# Patient Record
Sex: MALE | Race: WHITE | NOT HISPANIC OR LATINO | Employment: FULL TIME | RURAL
[De-identification: names, ages, dates, MRNs, and addresses within clinical notes are randomized per-mention and may not be internally consistent; named-entity substitution may affect disease eponyms.]

---

## 2020-07-09 ENCOUNTER — HISTORICAL (OUTPATIENT)
Dept: ADMINISTRATIVE | Facility: HOSPITAL | Age: 40
End: 2020-07-09

## 2020-07-10 LAB
INSULIN SERPL-ACNC: NORMAL U[IU]/ML
LAB AP CLINICAL INFORMATION: NORMAL
LAB AP COMMENTS: NORMAL
LAB AP DIAGNOSIS - HISTORICAL: NORMAL
LAB AP GROSS PATHOLOGY - HISTORICAL: NORMAL
LAB AP SPECIMEN SUBMITTED - HISTORICAL: NORMAL

## 2021-04-12 ENCOUNTER — OFFICE VISIT (OUTPATIENT)
Dept: FAMILY MEDICINE | Facility: CLINIC | Age: 41
End: 2021-04-12
Payer: COMMERCIAL

## 2021-04-12 VITALS
BODY MASS INDEX: 24.52 KG/M2 | RESPIRATION RATE: 16 BRPM | WEIGHT: 181 LBS | DIASTOLIC BLOOD PRESSURE: 82 MMHG | SYSTOLIC BLOOD PRESSURE: 130 MMHG | HEART RATE: 80 BPM | HEIGHT: 72 IN

## 2021-04-12 DIAGNOSIS — F41.9 ANXIETY: ICD-10-CM

## 2021-04-12 DIAGNOSIS — M47.9 SPONDYLOSIS: ICD-10-CM

## 2021-04-12 DIAGNOSIS — N52.9 ERECTILE DYSFUNCTION, UNSPECIFIED ERECTILE DYSFUNCTION TYPE: Primary | ICD-10-CM

## 2021-04-12 PROCEDURE — 99214 PR OFFICE/OUTPT VISIT, EST, LEVL IV, 30-39 MIN: ICD-10-PCS | Mod: ,,, | Performed by: FAMILY MEDICINE

## 2021-04-12 PROCEDURE — 99214 OFFICE O/P EST MOD 30 MIN: CPT | Mod: ,,, | Performed by: FAMILY MEDICINE

## 2021-04-12 RX ORDER — TESTOSTERONE CYPIONATE 200 MG/ML
INJECTION, SOLUTION INTRAMUSCULAR
COMMUNITY
Start: 2021-04-09 | End: 2022-08-12

## 2021-04-12 RX ORDER — TADALAFIL 20 MG/1
20 TABLET ORAL DAILY
Qty: 10 TABLET | Refills: 11 | Status: SHIPPED | OUTPATIENT
Start: 2021-04-12 | End: 2022-07-26 | Stop reason: SDUPTHER

## 2021-04-12 RX ORDER — DEXLANSOPRAZOLE 60 MG/1
1 CAPSULE, DELAYED RELEASE ORAL DAILY
COMMUNITY
Start: 2021-04-09 | End: 2021-08-09 | Stop reason: SDUPTHER

## 2021-04-12 RX ORDER — CYCLOBENZAPRINE HCL 10 MG
10 TABLET ORAL 3 TIMES DAILY PRN
Qty: 90 TABLET | Refills: 2 | Status: SHIPPED | OUTPATIENT
Start: 2021-04-12 | End: 2021-04-22

## 2021-04-12 RX ORDER — CLONAZEPAM 1 MG/1
1 TABLET ORAL 2 TIMES DAILY PRN
COMMUNITY
End: 2021-04-16 | Stop reason: SDUPTHER

## 2021-04-12 RX ORDER — CLONAZEPAM 1 MG/1
1 TABLET, ORALLY DISINTEGRATING ORAL 2 TIMES DAILY PRN
Qty: 30 TABLET | Refills: 2 | Status: SHIPPED | OUTPATIENT
Start: 2021-04-12 | End: 2021-04-16

## 2021-04-12 RX ORDER — CERTOLIZUMAB PEGOL 400 MG
400 KIT SUBCUTANEOUS
COMMUNITY
End: 2022-11-20

## 2021-04-13 DIAGNOSIS — M54.2 NECK PAIN: Primary | ICD-10-CM

## 2021-04-13 DIAGNOSIS — M54.9 DORSALGIA, UNSPECIFIED: ICD-10-CM

## 2021-04-13 RX ORDER — CLONAZEPAM 1 MG/1
1 TABLET ORAL 2 TIMES DAILY PRN
OUTPATIENT
Start: 2021-04-13

## 2021-04-16 RX ORDER — CLONAZEPAM 1 MG/1
1 TABLET ORAL 2 TIMES DAILY PRN
Qty: 30 TABLET | Refills: 0 | Status: SHIPPED | OUTPATIENT
Start: 2021-04-16 | End: 2021-06-02 | Stop reason: SDUPTHER

## 2021-04-29 DIAGNOSIS — M54.9 DORSALGIA, UNSPECIFIED: ICD-10-CM

## 2021-04-29 DIAGNOSIS — M54.2 NECK PAIN: Primary | ICD-10-CM

## 2021-05-03 ENCOUNTER — HOSPITAL ENCOUNTER (OUTPATIENT)
Dept: RADIOLOGY | Facility: HOSPITAL | Age: 41
Discharge: HOME | End: 2021-05-03
Attending: ORTHOPAEDIC SURGERY
Payer: COMMERCIAL

## 2021-05-03 ENCOUNTER — OFFICE VISIT (OUTPATIENT)
Dept: SPINE | Facility: CLINIC | Age: 41
End: 2021-05-03
Payer: COMMERCIAL

## 2021-05-03 DIAGNOSIS — M54.2 NECK PAIN: ICD-10-CM

## 2021-05-03 DIAGNOSIS — M54.16 LUMBAR RADICULOPATHY: ICD-10-CM

## 2021-05-03 DIAGNOSIS — M51.36 DDD (DEGENERATIVE DISC DISEASE), LUMBAR: ICD-10-CM

## 2021-05-03 DIAGNOSIS — G56.01 CARPAL TUNNEL SYNDROME, RIGHT: ICD-10-CM

## 2021-05-03 DIAGNOSIS — G56.21 CUBITAL TUNNEL SYNDROME, RIGHT: ICD-10-CM

## 2021-05-03 DIAGNOSIS — G56.22 CUBITAL TUNNEL SYNDROME, LEFT: ICD-10-CM

## 2021-05-03 DIAGNOSIS — M54.12 CERVICAL RADICULOPATHY: Primary | ICD-10-CM

## 2021-05-03 DIAGNOSIS — M47.9 SPONDYLOSIS: ICD-10-CM

## 2021-05-03 DIAGNOSIS — G56.02 CARPAL TUNNEL SYNDROME, LEFT: ICD-10-CM

## 2021-05-03 DIAGNOSIS — M50.30 DDD (DEGENERATIVE DISC DISEASE), CERVICAL: ICD-10-CM

## 2021-05-03 DIAGNOSIS — M54.9 DORSALGIA, UNSPECIFIED: ICD-10-CM

## 2021-05-03 PROCEDURE — 72110 X-RAY EXAM L-2 SPINE 4/>VWS: CPT | Mod: 26,,, | Performed by: ORTHOPAEDIC SURGERY

## 2021-05-03 PROCEDURE — 72110 X-RAY EXAM L-2 SPINE 4/>VWS: CPT | Mod: TC

## 2021-05-03 PROCEDURE — 99214 OFFICE O/P EST MOD 30 MIN: CPT | Mod: S$PBB,,, | Performed by: ORTHOPAEDIC SURGERY

## 2021-05-03 PROCEDURE — 99214 PR OFFICE/OUTPT VISIT, EST, LEVL IV, 30-39 MIN: ICD-10-PCS | Mod: S$PBB,,, | Performed by: ORTHOPAEDIC SURGERY

## 2021-05-03 PROCEDURE — 99213 OFFICE O/P EST LOW 20 MIN: CPT | Mod: PBBFAC,25,, | Performed by: ORTHOPAEDIC SURGERY

## 2021-05-03 PROCEDURE — 72050 X-RAY EXAM NECK SPINE 4/5VWS: CPT | Mod: TC

## 2021-05-03 PROCEDURE — 72050 XR CERVICAL SPINE AP LAT WITH FLEX EXTEN: ICD-10-PCS | Mod: 26,,, | Performed by: ORTHOPAEDIC SURGERY

## 2021-05-03 PROCEDURE — 99213 HC OFFICE/OUTPT VISIT, EST, LEVL III, 20-29 MIN: ICD-10-PCS | Mod: PBBFAC,25,, | Performed by: ORTHOPAEDIC SURGERY

## 2021-05-03 PROCEDURE — 72110 XR LUMBAR SPINE 5 VIEW WITH FLEX AND EXT: ICD-10-PCS | Mod: 26,,, | Performed by: ORTHOPAEDIC SURGERY

## 2021-05-03 PROCEDURE — 72050 X-RAY EXAM NECK SPINE 4/5VWS: CPT | Mod: 26,,, | Performed by: ORTHOPAEDIC SURGERY

## 2021-05-03 PROCEDURE — 99213 OFFICE O/P EST LOW 20 MIN: CPT | Mod: PBBFAC,25 | Performed by: ORTHOPAEDIC SURGERY

## 2021-06-02 RX ORDER — CLONAZEPAM 1 MG/1
1 TABLET ORAL 2 TIMES DAILY PRN
Qty: 10 TABLET | Refills: 0 | Status: SHIPPED | OUTPATIENT
Start: 2021-06-02 | End: 2021-07-22 | Stop reason: SDUPTHER

## 2021-07-22 ENCOUNTER — PATIENT MESSAGE (OUTPATIENT)
Dept: FAMILY MEDICINE | Facility: CLINIC | Age: 41
End: 2021-07-22

## 2021-08-09 ENCOUNTER — OFFICE VISIT (OUTPATIENT)
Dept: FAMILY MEDICINE | Facility: CLINIC | Age: 41
End: 2021-08-09
Payer: COMMERCIAL

## 2021-08-09 VITALS
BODY MASS INDEX: 24.11 KG/M2 | SYSTOLIC BLOOD PRESSURE: 130 MMHG | RESPIRATION RATE: 16 BRPM | HEIGHT: 72 IN | HEART RATE: 88 BPM | TEMPERATURE: 97 F | WEIGHT: 178 LBS | DIASTOLIC BLOOD PRESSURE: 88 MMHG

## 2021-08-09 DIAGNOSIS — Z13.220 SCREENING FOR LIPOID DISORDERS: ICD-10-CM

## 2021-08-09 DIAGNOSIS — Z13.1 SCREENING FOR DIABETES MELLITUS: ICD-10-CM

## 2021-08-09 DIAGNOSIS — Z79.899 ENCOUNTER FOR LONG-TERM (CURRENT) USE OF OTHER MEDICATIONS: Primary | ICD-10-CM

## 2021-08-09 DIAGNOSIS — F41.9 ANXIETY: ICD-10-CM

## 2021-08-09 DIAGNOSIS — K21.9 GASTROESOPHAGEAL REFLUX DISEASE WITHOUT ESOPHAGITIS: ICD-10-CM

## 2021-08-09 DIAGNOSIS — Z00.00 WELL ADULT EXAM: ICD-10-CM

## 2021-08-09 LAB
CHOLEST SERPL-MCNC: 142 MG/DL (ref 0–200)
CHOLEST/HDLC SERPL: 3.7 {RATIO}
CTP QC/QA: YES
GLUCOSE SERPL-MCNC: 109 MG/DL (ref 74–106)
HDLC SERPL-MCNC: 38 MG/DL (ref 40–60)
LDLC SERPL CALC-MCNC: 68 MG/DL
LDLC/HDLC SERPL: 1.8 {RATIO}
NONHDLC SERPL-MCNC: 104 MG/DL
POC (AMP) AMPHETAMINE: NEGATIVE
POC (BAR) BARBITURATES: NEGATIVE
POC (BUP) BUPRENORPHINE: NEGATIVE
POC (BZO) BENZODIAZEPINES: NEGATIVE
POC (COC) COCAINE: NEGATIVE
POC (MDMA) METHYLENEDIOXYMETHAMPHETAMINE 3,4: NEGATIVE
POC (MET) METHAMPHETAMINE: NEGATIVE
POC (MOP) OPIATES: NEGATIVE
POC (MTD) METHADONE: NEGATIVE
POC (OXY) OXYCODONE: NEGATIVE
POC (PCP) PHENCYCLIDINE: NEGATIVE
POC (TCA) TRICYCLIC ANTIDEPRESSANTS: NEGATIVE
POC TEMPERATURE (URINE): 92
POC THC: NEGATIVE
TRIGL SERPL-MCNC: 181 MG/DL (ref 35–150)
VLDLC SERPL-MCNC: 36 MG/DL

## 2021-08-09 PROCEDURE — 3075F SYST BP GE 130 - 139MM HG: CPT | Mod: ,,, | Performed by: FAMILY MEDICINE

## 2021-08-09 PROCEDURE — 82947 GLUCOSE, FASTING: ICD-10-PCS | Mod: ,,, | Performed by: CLINICAL MEDICAL LABORATORY

## 2021-08-09 PROCEDURE — 80061 LIPID PANEL: ICD-10-PCS | Mod: ,,, | Performed by: CLINICAL MEDICAL LABORATORY

## 2021-08-09 PROCEDURE — 1159F MED LIST DOCD IN RCRD: CPT | Mod: ,,, | Performed by: FAMILY MEDICINE

## 2021-08-09 PROCEDURE — 80061 LIPID PANEL: CPT | Mod: ,,, | Performed by: CLINICAL MEDICAL LABORATORY

## 2021-08-09 PROCEDURE — 3075F PR MOST RECENT SYSTOLIC BLOOD PRESS GE 130-139MM HG: ICD-10-PCS | Mod: ,,, | Performed by: FAMILY MEDICINE

## 2021-08-09 PROCEDURE — 80305 POCT URINE DRUG SCREEN PRESUMP: ICD-10-PCS | Mod: QW,,, | Performed by: FAMILY MEDICINE

## 2021-08-09 PROCEDURE — 80305 DRUG TEST PRSMV DIR OPT OBS: CPT | Mod: QW,,, | Performed by: FAMILY MEDICINE

## 2021-08-09 PROCEDURE — 1159F PR MEDICATION LIST DOCUMENTED IN MEDICAL RECORD: ICD-10-PCS | Mod: ,,, | Performed by: FAMILY MEDICINE

## 2021-08-09 PROCEDURE — 99396 PREV VISIT EST AGE 40-64: CPT | Mod: ,,, | Performed by: FAMILY MEDICINE

## 2021-08-09 PROCEDURE — 82947 ASSAY GLUCOSE BLOOD QUANT: CPT | Mod: ,,, | Performed by: CLINICAL MEDICAL LABORATORY

## 2021-08-09 PROCEDURE — 3008F PR BODY MASS INDEX (BMI) DOCUMENTED: ICD-10-PCS | Mod: ,,, | Performed by: FAMILY MEDICINE

## 2021-08-09 PROCEDURE — 99396 PR PREVENTIVE VISIT,EST,40-64: ICD-10-PCS | Mod: ,,, | Performed by: FAMILY MEDICINE

## 2021-08-09 PROCEDURE — 3079F DIAST BP 80-89 MM HG: CPT | Mod: ,,, | Performed by: FAMILY MEDICINE

## 2021-08-09 PROCEDURE — 3079F PR MOST RECENT DIASTOLIC BLOOD PRESSURE 80-89 MM HG: ICD-10-PCS | Mod: ,,, | Performed by: FAMILY MEDICINE

## 2021-08-09 PROCEDURE — 3008F BODY MASS INDEX DOCD: CPT | Mod: ,,, | Performed by: FAMILY MEDICINE

## 2021-08-09 RX ORDER — CLONAZEPAM 1 MG/1
1 TABLET ORAL DAILY
Qty: 30 TABLET | Refills: 2 | Status: SHIPPED | OUTPATIENT
Start: 2021-08-09 | End: 2022-05-18

## 2021-08-09 RX ORDER — PREGABALIN 75 MG/1
CAPSULE ORAL
COMMUNITY
Start: 2021-04-19 | End: 2022-05-18

## 2021-08-09 RX ORDER — DEXLANSOPRAZOLE 60 MG/1
1 CAPSULE, DELAYED RELEASE ORAL DAILY
Qty: 90 CAPSULE | Refills: 3 | Status: SHIPPED | OUTPATIENT
Start: 2021-08-09 | End: 2022-07-26 | Stop reason: SDUPTHER

## 2021-08-09 RX ORDER — CELECOXIB 200 MG/1
CAPSULE ORAL
COMMUNITY
Start: 2021-05-28 | End: 2022-05-18

## 2021-08-09 RX ORDER — CYCLOBENZAPRINE HCL 10 MG
TABLET ORAL
COMMUNITY
Start: 2021-05-28 | End: 2022-05-18

## 2022-03-17 ENCOUNTER — PATIENT MESSAGE (OUTPATIENT)
Dept: FAMILY MEDICINE | Facility: CLINIC | Age: 42
End: 2022-03-17
Payer: COMMERCIAL

## 2022-04-05 ENCOUNTER — OFFICE VISIT (OUTPATIENT)
Dept: FAMILY MEDICINE | Facility: CLINIC | Age: 42
End: 2022-04-05
Payer: COMMERCIAL

## 2022-04-05 VITALS
HEART RATE: 80 BPM | RESPIRATION RATE: 16 BRPM | SYSTOLIC BLOOD PRESSURE: 126 MMHG | HEIGHT: 72 IN | WEIGHT: 184 LBS | BODY MASS INDEX: 24.92 KG/M2 | DIASTOLIC BLOOD PRESSURE: 88 MMHG

## 2022-04-05 DIAGNOSIS — Z79.899 ENCOUNTER FOR LONG-TERM (CURRENT) USE OF OTHER MEDICATIONS: Primary | ICD-10-CM

## 2022-04-05 DIAGNOSIS — K21.9 GASTROESOPHAGEAL REFLUX DISEASE WITHOUT ESOPHAGITIS: ICD-10-CM

## 2022-04-05 DIAGNOSIS — F41.9 ANXIETY: ICD-10-CM

## 2022-04-05 PROCEDURE — 3074F PR MOST RECENT SYSTOLIC BLOOD PRESSURE < 130 MM HG: ICD-10-PCS | Mod: ,,, | Performed by: FAMILY MEDICINE

## 2022-04-05 PROCEDURE — 3079F DIAST BP 80-89 MM HG: CPT | Mod: ,,, | Performed by: FAMILY MEDICINE

## 2022-04-05 PROCEDURE — 99499 UNLISTED E&M SERVICE: CPT | Mod: ,,, | Performed by: FAMILY MEDICINE

## 2022-04-05 PROCEDURE — 3008F PR BODY MASS INDEX (BMI) DOCUMENTED: ICD-10-PCS | Mod: ,,, | Performed by: FAMILY MEDICINE

## 2022-04-05 PROCEDURE — 1159F MED LIST DOCD IN RCRD: CPT | Mod: ,,, | Performed by: FAMILY MEDICINE

## 2022-04-05 PROCEDURE — 1159F PR MEDICATION LIST DOCUMENTED IN MEDICAL RECORD: ICD-10-PCS | Mod: ,,, | Performed by: FAMILY MEDICINE

## 2022-04-05 PROCEDURE — 3074F SYST BP LT 130 MM HG: CPT | Mod: ,,, | Performed by: FAMILY MEDICINE

## 2022-04-05 PROCEDURE — 3079F PR MOST RECENT DIASTOLIC BLOOD PRESSURE 80-89 MM HG: ICD-10-PCS | Mod: ,,, | Performed by: FAMILY MEDICINE

## 2022-04-05 PROCEDURE — 3008F BODY MASS INDEX DOCD: CPT | Mod: ,,, | Performed by: FAMILY MEDICINE

## 2022-04-05 PROCEDURE — 99499 NO LOS: ICD-10-PCS | Mod: ,,, | Performed by: FAMILY MEDICINE

## 2022-04-05 RX ORDER — TESTOSTERONE CYPIONATE 200 MG/ML
INJECTION, SOLUTION INTRAMUSCULAR
Qty: 10 ML | Refills: 1 | Status: CANCELLED | OUTPATIENT
Start: 2022-04-05

## 2022-04-05 RX ORDER — CYCLOBENZAPRINE HCL 10 MG
TABLET ORAL
Qty: 90 TABLET | Refills: 3 | Status: CANCELLED | OUTPATIENT
Start: 2022-04-05

## 2022-04-05 RX ORDER — CLONAZEPAM 1 MG/1
1 TABLET ORAL DAILY
Qty: 30 TABLET | Refills: 2 | Status: CANCELLED | OUTPATIENT
Start: 2022-04-05

## 2022-04-05 RX ORDER — DEXLANSOPRAZOLE 60 MG/1
1 CAPSULE, DELAYED RELEASE ORAL DAILY
Qty: 90 CAPSULE | Refills: 3 | Status: CANCELLED | OUTPATIENT
Start: 2022-04-05

## 2022-04-26 NOTE — PROGRESS NOTES
cancel     New Osman MD        PATIENT NAME: Alda Gamboa  : 1980  DATE: 22  MRN: [id number]      Billing Provider: New Osman MD  Level of Service: NO LOS  Patient PCP Information     Provider PCP Type    New Osman MD General          Reason for Visit / Chief Complaint: Anxiety (3 month med check , having some confusion and trouble focusing )       Update PCP  Update Chief Complaint         History of Present Illness / Problem Focused Workflow     Alda Gamboa presents to the clinic with Anxiety (3 month med check , having some confusion and trouble focusing )     Pt cancelled appt      Review of Systems     Review of Systems     Medical / Social / Family History     Past Medical History:   Diagnosis Date    Back pain     Neck pain     Spondylosis        No past surgical history on file.    Social History    reports that he has quit smoking. He has never used smokeless tobacco.    Family History  's family history is not on file.    Medications and Allergies     Medications  No outpatient medications have been marked as taking for the 22 encounter (Office Visit) with New Osman MD.       Allergies  Review of patient's allergies indicates:   Allergen Reactions    Neurontin [gabapentin]     Pcn [penicillins]        Physical Examination     Vitals:    22 1026   BP: 126/88   Pulse: 80   Resp: 16     Physical Exam     Assessment and Plan (including Health Maintenance)      Problem List  Smart Sets  Document Outside HM   :    Plan:   Encounter for long-term (current) use of other medications  -     Cancel: POCT Urine Drug Screen Presump    Anxiety    Gastroesophageal reflux disease without esophagitis           Health Maintenance Due   Topic Date Due    Hepatitis C Screening  Never done    COVID-19 Vaccine (1) Never done    HIV Screening  Never done    TETANUS VACCINE  Never done       Problem List Items Addressed This Visit    None     Visit Diagnoses      Encounter for long-term (current) use of other medications    -  Primary    Anxiety        Gastroesophageal reflux disease without esophagitis              Health Maintenance Topics with due status: Not Due       Topic Last Completion Date    Lipid Panel 08/09/2021    Influenza Vaccine Not Due       Future Appointments   Date Time Provider Department Center   7/21/2022  8:40 AM Anuradha Cruz MD [facility]   8/9/2022  1:00 PM New Osman MD [facility]        There are no Patient Instructions on file for this visit.  No follow-ups on file.     Signature:  New Osman MD      Date of encounter: 4/5/22

## 2022-05-18 ENCOUNTER — OFFICE VISIT (OUTPATIENT)
Dept: FAMILY MEDICINE | Facility: CLINIC | Age: 42
End: 2022-05-18
Payer: COMMERCIAL

## 2022-05-18 VITALS
BODY MASS INDEX: 23.7 KG/M2 | TEMPERATURE: 99 F | OXYGEN SATURATION: 98 % | SYSTOLIC BLOOD PRESSURE: 106 MMHG | HEART RATE: 135 BPM | WEIGHT: 175 LBS | HEIGHT: 72 IN | DIASTOLIC BLOOD PRESSURE: 76 MMHG

## 2022-05-18 DIAGNOSIS — R50.9 FEVER, UNSPECIFIED FEVER CAUSE: Primary | ICD-10-CM

## 2022-05-18 DIAGNOSIS — N41.9 PROSTATITIS, UNSPECIFIED PROSTATITIS TYPE: ICD-10-CM

## 2022-05-18 DIAGNOSIS — R51.9 ACUTE NONINTRACTABLE HEADACHE, UNSPECIFIED HEADACHE TYPE: ICD-10-CM

## 2022-05-18 LAB
BASOPHILS # BLD AUTO: 0.01 K/UL (ref 0–0.2)
BASOPHILS NFR BLD AUTO: 0.1 % (ref 0–1)
BILIRUB SERPL-MCNC: ABNORMAL MG/DL
BLOOD URINE, POC: ABNORMAL
COLOR, POC UA: YELLOW
CTP QC/QA: YES
DIFFERENTIAL METHOD BLD: ABNORMAL
EOSINOPHIL # BLD AUTO: 0.02 K/UL (ref 0–0.5)
EOSINOPHIL NFR BLD AUTO: 0.2 % (ref 1–4)
ERYTHROCYTE [DISTWIDTH] IN BLOOD BY AUTOMATED COUNT: 12.6 % (ref 11.5–14.5)
FLUAV AG NPH QL: NEGATIVE
FLUBV AG NPH QL: NEGATIVE
GLUCOSE UR QL STRIP: ABNORMAL
HCT VFR BLD AUTO: 38 % (ref 40–54)
HGB BLD-MCNC: 13.2 G/DL (ref 13.5–18)
IMM GRANULOCYTES # BLD AUTO: 0.06 K/UL (ref 0–0.04)
IMM GRANULOCYTES NFR BLD: 0.6 % (ref 0–0.4)
KETONES UR QL STRIP: ABNORMAL
LEUKOCYTE ESTERASE URINE, POC: ABNORMAL
LYMPHOCYTES # BLD AUTO: 0.6 K/UL (ref 1–4.8)
LYMPHOCYTES NFR BLD AUTO: 6.3 % (ref 27–41)
MCH RBC QN AUTO: 30.3 PG (ref 27–31)
MCHC RBC AUTO-ENTMCNC: 34.7 G/DL (ref 32–36)
MCV RBC AUTO: 87.2 FL (ref 80–96)
MONOCYTES # BLD AUTO: 0.17 K/UL (ref 0–0.8)
MONOCYTES NFR BLD AUTO: 1.8 % (ref 2–6)
MPC BLD CALC-MCNC: 9.2 FL (ref 9.4–12.4)
NEUTROPHILS # BLD AUTO: 8.62 K/UL (ref 1.8–7.7)
NEUTROPHILS NFR BLD AUTO: 91 % (ref 53–65)
NITRITE, POC UA: ABNORMAL
NRBC # BLD AUTO: 0 X10E3/UL
NRBC, AUTO (.00): 0 %
PH, POC UA: 7
PLATELET # BLD AUTO: 205 K/UL (ref 150–400)
PROTEIN, POC: ABNORMAL
RBC # BLD AUTO: 4.36 M/UL (ref 4.6–6.2)
SARS-COV-2 AG RESP QL IA.RAPID: NEGATIVE
SPECIFIC GRAVITY, POC UA: 1.02
UROBILINOGEN, POC UA: 0.2
WBC # BLD AUTO: 9.48 K/UL (ref 4.5–11)

## 2022-05-18 PROCEDURE — 3008F PR BODY MASS INDEX (BMI) DOCUMENTED: ICD-10-PCS | Mod: ,,, | Performed by: NURSE PRACTITIONER

## 2022-05-18 PROCEDURE — 87186 SC STD MICRODIL/AGAR DIL: CPT | Mod: ,,, | Performed by: CLINICAL MEDICAL LABORATORY

## 2022-05-18 PROCEDURE — 3078F DIAST BP <80 MM HG: CPT | Mod: ,,, | Performed by: NURSE PRACTITIONER

## 2022-05-18 PROCEDURE — 3074F PR MOST RECENT SYSTOLIC BLOOD PRESSURE < 130 MM HG: ICD-10-PCS | Mod: ,,, | Performed by: NURSE PRACTITIONER

## 2022-05-18 PROCEDURE — 87186 CULTURE, URINE: ICD-10-PCS | Mod: ,,, | Performed by: CLINICAL MEDICAL LABORATORY

## 2022-05-18 PROCEDURE — 87086 CULTURE, URINE: ICD-10-PCS | Mod: ,,, | Performed by: CLINICAL MEDICAL LABORATORY

## 2022-05-18 PROCEDURE — 1160F RVW MEDS BY RX/DR IN RCRD: CPT | Mod: ,,, | Performed by: NURSE PRACTITIONER

## 2022-05-18 PROCEDURE — 3008F BODY MASS INDEX DOCD: CPT | Mod: ,,, | Performed by: NURSE PRACTITIONER

## 2022-05-18 PROCEDURE — 85025 COMPLETE CBC W/AUTO DIFF WBC: CPT | Mod: ,,, | Performed by: CLINICAL MEDICAL LABORATORY

## 2022-05-18 PROCEDURE — 1160F PR REVIEW ALL MEDS BY PRESCRIBER/CLIN PHARMACIST DOCUMENTED: ICD-10-PCS | Mod: ,,, | Performed by: NURSE PRACTITIONER

## 2022-05-18 PROCEDURE — 1159F MED LIST DOCD IN RCRD: CPT | Mod: ,,, | Performed by: NURSE PRACTITIONER

## 2022-05-18 PROCEDURE — 96372 PR INJECTION,THERAP/PROPH/DIAG2ST, IM OR SUBCUT: ICD-10-PCS | Mod: ,,, | Performed by: NURSE PRACTITIONER

## 2022-05-18 PROCEDURE — 96372 THER/PROPH/DIAG INJ SC/IM: CPT | Mod: ,,, | Performed by: NURSE PRACTITIONER

## 2022-05-18 PROCEDURE — 87077 CULTURE, URINE: ICD-10-PCS | Mod: ,,, | Performed by: CLINICAL MEDICAL LABORATORY

## 2022-05-18 PROCEDURE — 3074F SYST BP LT 130 MM HG: CPT | Mod: ,,, | Performed by: NURSE PRACTITIONER

## 2022-05-18 PROCEDURE — 99214 OFFICE O/P EST MOD 30 MIN: CPT | Mod: 25,,, | Performed by: NURSE PRACTITIONER

## 2022-05-18 PROCEDURE — 99214 PR OFFICE/OUTPT VISIT, EST, LEVL IV, 30-39 MIN: ICD-10-PCS | Mod: 25,,, | Performed by: NURSE PRACTITIONER

## 2022-05-18 PROCEDURE — 81003 POCT URINALYSIS W/O SCOPE: ICD-10-PCS | Mod: QW,,, | Performed by: NURSE PRACTITIONER

## 2022-05-18 PROCEDURE — 87077 CULTURE AEROBIC IDENTIFY: CPT | Mod: ,,, | Performed by: CLINICAL MEDICAL LABORATORY

## 2022-05-18 PROCEDURE — 85025 CBC WITH DIFFERENTIAL: ICD-10-PCS | Mod: ,,, | Performed by: CLINICAL MEDICAL LABORATORY

## 2022-05-18 PROCEDURE — 3078F PR MOST RECENT DIASTOLIC BLOOD PRESSURE < 80 MM HG: ICD-10-PCS | Mod: ,,, | Performed by: NURSE PRACTITIONER

## 2022-05-18 PROCEDURE — 87428 SARSCOV & INF VIR A&B AG IA: CPT | Mod: QW,,, | Performed by: NURSE PRACTITIONER

## 2022-05-18 PROCEDURE — 87428 POCT SARS-COV2 (COVID) WITH FLU ANTIGEN: ICD-10-PCS | Mod: QW,,, | Performed by: NURSE PRACTITIONER

## 2022-05-18 PROCEDURE — 81003 URINALYSIS AUTO W/O SCOPE: CPT | Mod: QW,,, | Performed by: NURSE PRACTITIONER

## 2022-05-18 PROCEDURE — 1159F PR MEDICATION LIST DOCUMENTED IN MEDICAL RECORD: ICD-10-PCS | Mod: ,,, | Performed by: NURSE PRACTITIONER

## 2022-05-18 PROCEDURE — 87086 URINE CULTURE/COLONY COUNT: CPT | Mod: ,,, | Performed by: CLINICAL MEDICAL LABORATORY

## 2022-05-18 RX ORDER — GENTAMICIN SULFATE 40 MG/ML
200 INJECTION, SOLUTION INTRAMUSCULAR; INTRAVENOUS
Status: DISCONTINUED | OUTPATIENT
Start: 2022-05-18 | End: 2022-05-18

## 2022-05-18 RX ORDER — CEFTRIAXONE 1 G/1
1 INJECTION, POWDER, FOR SOLUTION INTRAMUSCULAR; INTRAVENOUS
Status: COMPLETED | OUTPATIENT
Start: 2022-05-18 | End: 2022-05-18

## 2022-05-18 RX ORDER — DICLOFENAC SODIUM 75 MG/1
75 TABLET, DELAYED RELEASE ORAL 2 TIMES DAILY PRN
COMMUNITY
Start: 2022-05-07 | End: 2023-10-23 | Stop reason: SDUPTHER

## 2022-05-18 RX ORDER — GENTAMICIN SULFATE 40 MG/ML
240 INJECTION, SOLUTION INTRAMUSCULAR; INTRAVENOUS
Status: DISCONTINUED | OUTPATIENT
Start: 2022-05-18 | End: 2022-05-18

## 2022-05-18 RX ADMIN — CEFTRIAXONE 1 G: 1 INJECTION, POWDER, FOR SOLUTION INTRAMUSCULAR; INTRAVENOUS at 01:05

## 2022-05-18 NOTE — LETTER
May 18, 2022      [facility]  [address] [place] [address] [place] [address]  Phone: [phone]  Fax: [phone]       Patient: Alda Gamboa   YOB: 1980  Date of Visit: 05/18/2022    To Whom It May Concern:    Jah Gamboa  was at [facility] on 05/18/2022. The patient may return to work/school on 05/23/2022 with no restrictions. If you have any questions or concerns, or if I can be of further assistance, please do not hesitate to contact me.    Sincerely,    Beverly Marshall LPN

## 2022-05-18 NOTE — PROGRESS NOTES
"Subjective:       Patient ID: Alda Gamboa is a 42 y.o. male.    Chief Complaint: Fever, Generalized Body Aches (And chills ), and Headache (Since last night )    Presents to clinic with wife as above. Fever has been up to 106. No abd pain, nausea, vomiting or diarrhea. No hx of BPH or urinary urgency. Urine had a odor yesterday. Denies hx of tick bite. Has headache and says has "coughed a few times."     Review of Systems   Constitutional: Positive for chills, fever and malaise/fatigue.   HENT: Negative.    Respiratory: Positive for cough. Negative for hemoptysis, sputum production, shortness of breath and wheezing.    Genitourinary: Positive for dysuria. Negative for flank pain, frequency, hematuria and urgency.   Musculoskeletal: Positive for myalgias.   Skin: Negative.    Neurological: Positive for headaches.          Reviewed family, medical, surgical, and social history.    Objective:      /76   Pulse (!) 135   Temp 99.3 °F (37.4 °C)   Ht 6' (1.829 m)   Wt 79.4 kg (175 lb)   SpO2 98%   BMI 23.73 kg/m²   Physical Exam  Vitals and nursing note reviewed.   Constitutional:       General: He is not in acute distress.     Appearance: Normal appearance. He is not ill-appearing, toxic-appearing or diaphoretic.   HENT:      Head: Normocephalic.      Right Ear: Tympanic membrane, ear canal and external ear normal. There is no impacted cerumen.      Left Ear: Tympanic membrane, ear canal and external ear normal. There is no impacted cerumen.      Nose: No congestion or rhinorrhea.      Mouth/Throat:      Mouth: Mucous membranes are moist.      Pharynx: No oropharyngeal exudate or posterior oropharyngeal erythema.   Cardiovascular:      Rate and Rhythm: Normal rate and regular rhythm.      Pulses: Normal pulses.      Heart sounds: Normal heart sounds.   Pulmonary:      Effort: Pulmonary effort is normal.      Breath sounds: Normal breath sounds.   Abdominal:      General: Abdomen is flat. Bowel sounds are " normal. There is no distension.      Palpations: Abdomen is soft. There is no mass.      Tenderness: There is no abdominal tenderness. There is no right CVA tenderness, left CVA tenderness, guarding or rebound.      Hernia: No hernia is present.   Musculoskeletal:      Cervical back: Normal range of motion and neck supple.   Skin:     General: Skin is warm and dry.      Capillary Refill: Capillary refill takes less than 2 seconds.   Neurological:      General: No focal deficit present.      Mental Status: He is alert and oriented to person, place, and time.   Psychiatric:         Mood and Affect: Mood normal.         Behavior: Behavior normal.         Thought Content: Thought content normal.         Judgment: Judgment normal.            Office Visit on 05/18/2022   Component Date Value Ref Range Status    SARS Coronavirus 2 Antigen 05/18/2022 Negative  Negative Final    Rapid Influenza A Ag 05/18/2022 Negative  Negative Final    Rapid Influenza B Ag 05/18/2022 Negative  Negative Final     Acceptable 05/18/2022 Yes   Final    WBC 05/18/2022 9.48  4.50 - 11.00 K/uL Final    RBC 05/18/2022 4.36 (A) 4.60 - 6.20 M/uL Final    Hemoglobin 05/18/2022 13.2 (A) 13.5 - 18.0 g/dL Final    Hematocrit 05/18/2022 38.0 (A) 40.0 - 54.0 % Final    MCV 05/18/2022 87.2  80.0 - 96.0 fL Final    MCH 05/18/2022 30.3  27.0 - 31.0 pg Final    MCHC 05/18/2022 34.7  32.0 - 36.0 g/dL Final    RDW 05/18/2022 12.6  11.5 - 14.5 % Final    Platelet Count 05/18/2022 205  150 - 400 K/uL Final    MPV 05/18/2022 9.2 (A) 9.4 - 12.4 fL Final    Neutrophils % 05/18/2022 91.0 (A) 53.0 - 65.0 % Final    Lymphocytes % 05/18/2022 6.3 (A) 27.0 - 41.0 % Final    Monocytes % 05/18/2022 1.8 (A) 2.0 - 6.0 % Final    Eosinophils % 05/18/2022 0.2 (A) 1.0 - 4.0 % Final    Basophils % 05/18/2022 0.1  0.0 - 1.0 % Final    Immature Granulocytes % 05/18/2022 0.6 (A) 0.0 - 0.4 % Final    nRBC, Auto 05/18/2022 0.0  <=0.0 % Final     Neutrophils, Abs 05/18/2022 8.62 (A) 1.80 - 7.70 K/uL Final    Lymphocytes, Absolute 05/18/2022 0.60 (A) 1.00 - 4.80 K/uL Final    Monocytes, Absolute 05/18/2022 0.17  0.00 - 0.80 K/uL Final    Eosinophils, Absolute 05/18/2022 0.02  0.00 - 0.50 K/uL Final    Basophils, Absolute 05/18/2022 0.01  0.00 - 0.20 K/uL Final    Immature Granulocytes, Absolute 05/18/2022 0.06 (A) 0.00 - 0.04 K/uL Final    nRBC, Absolute 05/18/2022 0.00  <=0.00 x10e3/uL Final    Diff Type 05/18/2022 Auto   Final    Color, UA 05/18/2022 Yellow   Final-Edited    Spec Grav UA 05/18/2022 1.020   Corrected    pH, UA 05/18/2022 7.0   Corrected    WBC, UA 05/18/2022 small   Corrected    Nitrite, UA 05/18/2022 pos   Corrected    Protein, POC 05/18/2022 trace   Corrected    Glucose, UA 05/18/2022 neg   Corrected    Ketones, UA 05/18/2022 neg   Corrected    Bilirubin, POC 05/18/2022 neg   Corrected    Urobilinogen, UA 05/18/2022 0.2   Corrected    Blood, UA 05/18/2022 trace-intact   Corrected    Culture, Urine 05/18/2022 >100,000 Klebsiella pneumoniae (A)  Incomplete    Corrected result: Previously reported as Gram-negative Bacilli on 5/19/2022 at 0938 CDT.    Culture, Urine 05/18/2022 >100,000 Gram-negative Bacilli (A)  Incomplete    Identification and Susceptibility to Follow    WBC 05/19/2022 11.92 (A) 4.50 - 11.00 K/uL Final    RBC 05/19/2022 4.43 (A) 4.60 - 6.20 M/uL Final    Hemoglobin 05/19/2022 13.6  13.5 - 18.0 g/dL Final    Hematocrit 05/19/2022 39.7 (A) 40.0 - 54.0 % Final    MCV 05/19/2022 89.6  80.0 - 96.0 fL Final    MCH 05/19/2022 30.7  27.0 - 31.0 pg Final    MCHC 05/19/2022 34.3  32.0 - 36.0 g/dL Final    RDW 05/19/2022 13.2  11.5 - 14.5 % Final    Platelet Count 05/19/2022 201  150 - 400 K/uL Final    MPV 05/19/2022 9.9  9.4 - 12.4 fL Final    Neutrophils % 05/19/2022 79.3 (A) 53.0 - 65.0 % Final    Lymphocytes % 05/19/2022 9.7 (A) 27.0 - 41.0 % Final    Monocytes % 05/19/2022 10.0 (A) 2.0 - 6.0 %  Final    Eosinophils % 05/19/2022 0.3 (A) 1.0 - 4.0 % Final    Basophils % 05/19/2022 0.3  0.0 - 1.0 % Final    Immature Granulocytes % 05/19/2022 0.4  0.0 - 0.4 % Final    nRBC, Auto 05/19/2022 0.0  <=0.0 % Final    Neutrophils, Abs 05/19/2022 9.45 (A) 1.80 - 7.70 K/uL Final    Lymphocytes, Absolute 05/19/2022 1.16  1.00 - 4.80 K/uL Final    Monocytes, Absolute 05/19/2022 1.19 (A) 0.00 - 0.80 K/uL Final    Eosinophils, Absolute 05/19/2022 0.03  0.00 - 0.50 K/uL Final    Basophils, Absolute 05/19/2022 0.04  0.00 - 0.20 K/uL Final    Immature Granulocytes, Absolute 05/19/2022 0.05 (A) 0.00 - 0.04 K/uL Final    nRBC, Absolute 05/19/2022 0.00  <=0.00 x10e3/uL Final    Diff Type 05/19/2022 Auto   Final      Assessment:       1. Fever, unspecified fever cause    2. Acute nonintractable headache, unspecified headache type    3. Prostatitis, unspecified prostatitis type        Plan:       Fever, unspecified fever cause  -     CBC Auto Differential; Future; Expected date: 05/18/2022  -     POCT SARS-COV2 (COVID) with Flu Antigen  -     X-Ray Chest PA And Lateral; Future; Expected date: 05/18/2022  -     POCT URINALYSIS W/O SCOPE  -     Urine culture; Future; Expected date: 05/18/2022    Acute nonintractable headache, unspecified headache type    Prostatitis, unspecified prostatitis type  -     cefTRIAXone injection 1 g  -     Discontinue: gentamicin injection 200 mg  -     Discontinue: gentamicin injection 240 mg  -     CBC Auto Differential  -     sulfamethoxazole-trimethoprim 800-160mg (BACTRIM DS) 800-160 mg Tab; Take 1 tablet by mouth 2 (two) times daily. for 10 days  Dispense: 20 tablet; Refill: 0    Phone consult with Dr. Anaya (Urologist). He advised me to given Rocephin IM and Gentamycin IM. However we do not have any Gentamycin and our hospital pharmacy does not have any. I called Dr. Anaya back and he said give the Rocephin and have patient return tomorrow for repeat CBC and another Rocephin shot. We  will wait for culture result before prescribing a PO med. Also discussed that patient takes a biologic for Ankylosing Spondylosis. Wife and patient strongly encouraged to go to the ER if he has another temperature that is greater than or equal to 105 or with ANY worsening. They agreed. Wife is a nurse. Inst may go to ER now if desires. Voiced agreement.           Risks, benefits, and side effects were discussed with the patient. All questions were answered to the fullest satisfaction of the patient, and pt verbalized understanding and agreement to treatment plan. Pt was to call with any new or worsening symptoms, or present to the ER.

## 2022-05-19 ENCOUNTER — CLINICAL SUPPORT (OUTPATIENT)
Dept: FAMILY MEDICINE | Facility: CLINIC | Age: 42
End: 2022-05-19
Payer: COMMERCIAL

## 2022-05-19 ENCOUNTER — PATIENT MESSAGE (OUTPATIENT)
Dept: FAMILY MEDICINE | Facility: CLINIC | Age: 42
End: 2022-05-19
Payer: COMMERCIAL

## 2022-05-19 DIAGNOSIS — N41.9 PROSTATITIS, UNSPECIFIED PROSTATITIS TYPE: ICD-10-CM

## 2022-05-19 DIAGNOSIS — R50.9 FEVER, UNSPECIFIED FEVER CAUSE: Primary | ICD-10-CM

## 2022-05-19 LAB
BASOPHILS # BLD AUTO: 0.04 K/UL (ref 0–0.2)
BASOPHILS NFR BLD AUTO: 0.3 % (ref 0–1)
DIFFERENTIAL METHOD BLD: ABNORMAL
EOSINOPHIL # BLD AUTO: 0.03 K/UL (ref 0–0.5)
EOSINOPHIL NFR BLD AUTO: 0.3 % (ref 1–4)
ERYTHROCYTE [DISTWIDTH] IN BLOOD BY AUTOMATED COUNT: 13.2 % (ref 11.5–14.5)
HCT VFR BLD AUTO: 39.7 % (ref 40–54)
HGB BLD-MCNC: 13.6 G/DL (ref 13.5–18)
IMM GRANULOCYTES # BLD AUTO: 0.05 K/UL (ref 0–0.04)
IMM GRANULOCYTES NFR BLD: 0.4 % (ref 0–0.4)
LYMPHOCYTES # BLD AUTO: 1.16 K/UL (ref 1–4.8)
LYMPHOCYTES NFR BLD AUTO: 9.7 % (ref 27–41)
MCH RBC QN AUTO: 30.7 PG (ref 27–31)
MCHC RBC AUTO-ENTMCNC: 34.3 G/DL (ref 32–36)
MCV RBC AUTO: 89.6 FL (ref 80–96)
MONOCYTES # BLD AUTO: 1.19 K/UL (ref 0–0.8)
MONOCYTES NFR BLD AUTO: 10 % (ref 2–6)
MPC BLD CALC-MCNC: 9.9 FL (ref 9.4–12.4)
NEUTROPHILS # BLD AUTO: 9.45 K/UL (ref 1.8–7.7)
NEUTROPHILS NFR BLD AUTO: 79.3 % (ref 53–65)
NRBC # BLD AUTO: 0 X10E3/UL
NRBC, AUTO (.00): 0 %
PLATELET # BLD AUTO: 201 K/UL (ref 150–400)
RBC # BLD AUTO: 4.43 M/UL (ref 4.6–6.2)
WBC # BLD AUTO: 11.92 K/UL (ref 4.5–11)

## 2022-05-19 PROCEDURE — 96372 PR INJECTION,THERAP/PROPH/DIAG2ST, IM OR SUBCUT: ICD-10-PCS | Mod: ,,, | Performed by: NURSE PRACTITIONER

## 2022-05-19 PROCEDURE — 85025 CBC WITH DIFFERENTIAL: ICD-10-PCS | Mod: ,,, | Performed by: CLINICAL MEDICAL LABORATORY

## 2022-05-19 PROCEDURE — 96372 THER/PROPH/DIAG INJ SC/IM: CPT | Mod: ,,, | Performed by: NURSE PRACTITIONER

## 2022-05-19 PROCEDURE — 85025 COMPLETE CBC W/AUTO DIFF WBC: CPT | Mod: ,,, | Performed by: CLINICAL MEDICAL LABORATORY

## 2022-05-19 RX ORDER — CEFTRIAXONE 1 G/1
1 INJECTION, POWDER, FOR SOLUTION INTRAMUSCULAR; INTRAVENOUS
Status: COMPLETED | OUTPATIENT
Start: 2022-05-19 | End: 2022-05-19

## 2022-05-19 RX ADMIN — CEFTRIAXONE 1 G: 1 INJECTION, POWDER, FOR SOLUTION INTRAMUSCULAR; INTRAVENOUS at 08:05

## 2022-05-20 RX ORDER — SULFAMETHOXAZOLE AND TRIMETHOPRIM 800; 160 MG/1; MG/1
1 TABLET ORAL 2 TIMES DAILY
Qty: 20 TABLET | Refills: 0 | Status: SHIPPED | OUTPATIENT
Start: 2022-05-20 | End: 2022-05-30

## 2022-05-20 RX ORDER — TESTOSTERONE CYPIONATE 200 MG/ML
INJECTION, SOLUTION INTRAMUSCULAR
OUTPATIENT
Start: 2022-05-20

## 2022-05-20 NOTE — PROGRESS NOTES
Phone call to patient. He has not had fever in 12 or so hours. Notified that I am sending in Bactrim DS for him to start today. I will call with the second organism. He voiced agreement and will start meds today.

## 2022-05-21 LAB — UA COMPLETE W REFLEX CULTURE PNL UR: ABNORMAL

## 2022-05-31 ENCOUNTER — OFFICE VISIT (OUTPATIENT)
Dept: UROLOGY | Facility: CLINIC | Age: 42
End: 2022-05-31
Payer: COMMERCIAL

## 2022-05-31 VITALS
HEIGHT: 72 IN | OXYGEN SATURATION: 98 % | WEIGHT: 175 LBS | HEART RATE: 76 BPM | SYSTOLIC BLOOD PRESSURE: 118 MMHG | DIASTOLIC BLOOD PRESSURE: 80 MMHG | BODY MASS INDEX: 23.7 KG/M2 | TEMPERATURE: 98 F

## 2022-05-31 DIAGNOSIS — N41.0 ACUTE BACTERIAL PROSTATITIS: Primary | ICD-10-CM

## 2022-05-31 DIAGNOSIS — R31.9 URINARY TRACT INFECTION WITH HEMATURIA, SITE UNSPECIFIED: ICD-10-CM

## 2022-05-31 DIAGNOSIS — N39.0 URINARY TRACT INFECTION WITH HEMATURIA, SITE UNSPECIFIED: ICD-10-CM

## 2022-05-31 DIAGNOSIS — M45.9 ANKYLOSING SPONDYLITIS, UNSPECIFIED SITE OF SPINE: ICD-10-CM

## 2022-05-31 LAB
BILIRUB UR QL STRIP: NEGATIVE
CLARITY UR: CLEAR
COLOR UR: YELLOW
GLUCOSE UR STRIP-MCNC: NEGATIVE MG/DL
KETONES UR STRIP-SCNC: NEGATIVE MG/DL
LEUKOCYTE ESTERASE UR QL STRIP: NEGATIVE
NITRITE UR QL STRIP: NEGATIVE
PH UR STRIP: 6 PH UNITS
PROT UR QL STRIP: NEGATIVE
RBC # UR STRIP: NEGATIVE /UL
SP GR UR STRIP: >=1.03
UROBILINOGEN UR STRIP-ACNC: 0.2 MG/DL

## 2022-05-31 PROCEDURE — 99203 PR OFFICE/OUTPT VISIT, NEW, LEVL III, 30-44 MIN: ICD-10-PCS | Mod: S$PBB,,, | Performed by: NURSE PRACTITIONER

## 2022-05-31 PROCEDURE — 81003 URINALYSIS: ICD-10-PCS | Mod: QW,,, | Performed by: CLINICAL MEDICAL LABORATORY

## 2022-05-31 PROCEDURE — 3008F PR BODY MASS INDEX (BMI) DOCUMENTED: ICD-10-PCS | Mod: ,,, | Performed by: NURSE PRACTITIONER

## 2022-05-31 PROCEDURE — 81003 URINALYSIS AUTO W/O SCOPE: CPT | Mod: QW,,, | Performed by: CLINICAL MEDICAL LABORATORY

## 2022-05-31 PROCEDURE — 1160F PR REVIEW ALL MEDS BY PRESCRIBER/CLIN PHARMACIST DOCUMENTED: ICD-10-PCS | Mod: ,,, | Performed by: NURSE PRACTITIONER

## 2022-05-31 PROCEDURE — 99203 OFFICE O/P NEW LOW 30 MIN: CPT | Mod: S$PBB,,, | Performed by: NURSE PRACTITIONER

## 2022-05-31 PROCEDURE — 87086 CULTURE, URINE: ICD-10-PCS | Mod: ,,, | Performed by: CLINICAL MEDICAL LABORATORY

## 2022-05-31 PROCEDURE — 3079F DIAST BP 80-89 MM HG: CPT | Mod: ,,, | Performed by: NURSE PRACTITIONER

## 2022-05-31 PROCEDURE — 1160F RVW MEDS BY RX/DR IN RCRD: CPT | Mod: ,,, | Performed by: NURSE PRACTITIONER

## 2022-05-31 PROCEDURE — 51798 US URINE CAPACITY MEASURE: CPT | Mod: PBBFAC | Performed by: NURSE PRACTITIONER

## 2022-05-31 PROCEDURE — 3074F PR MOST RECENT SYSTOLIC BLOOD PRESSURE < 130 MM HG: ICD-10-PCS | Mod: ,,, | Performed by: NURSE PRACTITIONER

## 2022-05-31 PROCEDURE — 1159F PR MEDICATION LIST DOCUMENTED IN MEDICAL RECORD: ICD-10-PCS | Mod: ,,, | Performed by: NURSE PRACTITIONER

## 2022-05-31 PROCEDURE — 99214 OFFICE O/P EST MOD 30 MIN: CPT | Mod: PBBFAC | Performed by: NURSE PRACTITIONER

## 2022-05-31 PROCEDURE — 3008F BODY MASS INDEX DOCD: CPT | Mod: ,,, | Performed by: NURSE PRACTITIONER

## 2022-05-31 PROCEDURE — 87086 URINE CULTURE/COLONY COUNT: CPT | Mod: ,,, | Performed by: CLINICAL MEDICAL LABORATORY

## 2022-05-31 PROCEDURE — 3079F PR MOST RECENT DIASTOLIC BLOOD PRESSURE 80-89 MM HG: ICD-10-PCS | Mod: ,,, | Performed by: NURSE PRACTITIONER

## 2022-05-31 PROCEDURE — 1159F MED LIST DOCD IN RCRD: CPT | Mod: ,,, | Performed by: NURSE PRACTITIONER

## 2022-05-31 PROCEDURE — 3074F SYST BP LT 130 MM HG: CPT | Mod: ,,, | Performed by: NURSE PRACTITIONER

## 2022-05-31 RX ORDER — DOXYCYCLINE 100 MG/1
CAPSULE ORAL
Qty: 37 CAPSULE | Refills: 0 | Status: SHIPPED | OUTPATIENT
Start: 2022-05-31 | End: 2022-09-15 | Stop reason: ALTCHOICE

## 2022-05-31 NOTE — ASSESSMENT & PLAN NOTE
· reculture urine  · UA, to confirm  Microhematuria has resolved with treatment.  · Records from Dr. Matt Grover - patient receiving TRT from him.  No PSA obtained per their records. Has been out x 5 months  · EMILY shows mild tenderness and boggy, will tx for one month with Doxycyline and see him back 6 weeks with PSA.   · F/u 4-6 weeks with PSA

## 2022-05-31 NOTE — PROGRESS NOTES
Subjective:       Patient ID: Alda Gamboa is a 42 y.o. male.    Chief Complaint: Prostatitis (Referred from Johnaa Pang-acute bacterial prostatitis. )    Mr. Gamboa is a pleasant 43 yo male who is here today for initial evaluation for acute prostatitis with his wife who is a [employer] employee (RN).  PMH includes recent treatment with immunosuppressant initiated for ankylosing spondylitis.  Patient states c/o frequency, nocturia with fever of 106.9 per spouses report on 5/17, 103.5 on 5/18.   WBC elevated to 12 at this time. He was treated with daily Rocephin, and a 10 day course of Bactrim DS po which was completed yesterday.  Urine culture grew >100,000 Klebsiella pneumoniae.  He states no fever since the 18th, he denies testicular or prostate pain, dysuria today, and reports he really improved overall after the Rocephin.  He had a semi boggy, mildly tender enlarged prostate on PE today.  (5/31/2022)---------------------------------------------------------------------    Review of Systems   Constitutional: Negative.    Respiratory: Negative.    Cardiovascular: Negative.    Gastrointestinal: Negative.    Genitourinary: Positive for decreased urine volume, frequency and urgency. Negative for difficulty urinating, dysuria, enuresis, flank pain, genital sores, hematuria, penile discharge, penile pain, penile swelling, scrotal swelling and testicular pain.        Feeling of not emptying, weak stream, decreased urination with frequency.   Musculoskeletal: Negative.    Skin: Negative.    Allergic/Immunologic: Negative.    Neurological: Negative.        Objective:      Physical Exam  Vitals and nursing note reviewed.   Constitutional:       General: He is not in acute distress.     Appearance: Normal appearance. He is normal weight. He is not ill-appearing, toxic-appearing or diaphoretic.   Eyes:      General: No scleral icterus.  Cardiovascular:      Rate and Rhythm: Normal rate.   Pulmonary:      Effort: Pulmonary  effort is normal.   Abdominal:      General: Bowel sounds are normal. There is no distension.      Palpations: Abdomen is soft.      Tenderness: There is no abdominal tenderness. There is no right CVA tenderness, left CVA tenderness or guarding.   Genitourinary:     Prostate: Normal.      Rectum: Normal.      Comments: Prostate boggy, mildly tender and enlarged  Musculoskeletal:      Right lower leg: No edema.      Left lower leg: No edema.   Skin:     General: Skin is warm and dry.      Coloration: Skin is not jaundiced or pale.      Findings: No bruising, erythema, lesion or rash.   Neurological:      Mental Status: He is alert and oriented to person, place, and time.   Psychiatric:         Mood and Affect: Mood normal.         Behavior: Behavior normal.         Thought Content: Thought content normal.         Judgment: Judgment normal.         Assessment:       1. Acute bacterial prostatitis    2. Urinary tract infection with hematuria, site unspecified    3. Ankylosing spondylitis, unspecified site of spine        Plan:       Acute bacterial prostatitis  · reculture urine  · UA, to confirm  Microhematuria has resolved with treatment.  · Records from Dr. Matt Grover - patient receiving TRT from him.  No PSA obtained per their records. Has been out x 5 months  · EMILY shows mild tenderness and boggy, will tx for one month with Doxycyline and see him back 6 weeks with PSA.   · F/u 4-6 weeks with PSA    Ankylosing spondylitis  Patient to alert Dr. Medel of UTI, Acute Prostatitis.

## 2022-06-02 ENCOUNTER — TELEPHONE (OUTPATIENT)
Dept: UROLOGY | Facility: CLINIC | Age: 42
End: 2022-06-02
Payer: COMMERCIAL

## 2022-06-02 LAB — UA COMPLETE W REFLEX CULTURE PNL UR: NO GROWTH

## 2022-06-02 NOTE — TELEPHONE ENCOUNTER
----- Message from BURTON Dong sent at 6/2/2022  6:53 AM CDT -----  Urine cx negative, continue Doxycyline and see him back 6 weeks with PSA.   Pt called me back and I informed him of the above message and reminded him of his 7-13-22 appointment.  He voiced understanding.

## 2022-06-02 NOTE — TELEPHONE ENCOUNTER
----- Message from BURTON Dong sent at 6/2/2022  6:53 AM CDT -----  Urine cx negative, continue Doxycyline and see him back 6 weeks with PSA.   I called pt and got no answer.  Left message that I was calling from urology and will try to call later.

## 2022-06-07 ENCOUNTER — TELEPHONE (OUTPATIENT)
Dept: UROLOGY | Facility: CLINIC | Age: 42
End: 2022-06-07
Payer: COMMERCIAL

## 2022-06-07 NOTE — TELEPHONE ENCOUNTER
----- Message from Evangelina Becker sent at 6/7/2022  2:37 PM CDT -----  Regarding: call back  Pt called and was asking about something on [employer].. something about Dr Fitzgerald or something. Call him back at [phone]  I called and got answering machine.  Left him a message that I cannot see anything about Dr Fitzgerald but his office numbers are [phone] and [phone].  He can call either of these numbers and see if they can help him.

## 2022-06-16 ENCOUNTER — PATIENT MESSAGE (OUTPATIENT)
Dept: UROLOGY | Facility: CLINIC | Age: 42
End: 2022-06-16
Payer: COMMERCIAL

## 2022-07-13 ENCOUNTER — OFFICE VISIT (OUTPATIENT)
Dept: UROLOGY | Facility: CLINIC | Age: 42
End: 2022-07-13
Payer: COMMERCIAL

## 2022-07-13 VITALS
WEIGHT: 175 LBS | BODY MASS INDEX: 23.7 KG/M2 | TEMPERATURE: 98 F | HEIGHT: 72 IN | SYSTOLIC BLOOD PRESSURE: 130 MMHG | OXYGEN SATURATION: 98 % | DIASTOLIC BLOOD PRESSURE: 83 MMHG | HEART RATE: 85 BPM

## 2022-07-13 DIAGNOSIS — N39.0 URINARY TRACT INFECTION WITHOUT HEMATURIA, SITE UNSPECIFIED: ICD-10-CM

## 2022-07-13 DIAGNOSIS — Z79.899 ENCOUNTER FOR LONG-TERM (CURRENT) USE OF MEDICATIONS: ICD-10-CM

## 2022-07-13 DIAGNOSIS — N41.0 ACUTE BACTERIAL PROSTATITIS: Primary | ICD-10-CM

## 2022-07-13 DIAGNOSIS — E29.1 TESTOSTERONE DEFICIENCY IN MALE: ICD-10-CM

## 2022-07-13 DIAGNOSIS — M45.9 ANKYLOSING SPONDYLITIS, UNSPECIFIED SITE OF SPINE: ICD-10-CM

## 2022-07-13 PROCEDURE — 3079F PR MOST RECENT DIASTOLIC BLOOD PRESSURE 80-89 MM HG: ICD-10-PCS | Mod: ,,, | Performed by: NURSE PRACTITIONER

## 2022-07-13 PROCEDURE — 87086 URINE CULTURE/COLONY COUNT: CPT | Mod: ,,, | Performed by: CLINICAL MEDICAL LABORATORY

## 2022-07-13 PROCEDURE — 99214 PR OFFICE/OUTPT VISIT, EST, LEVL IV, 30-39 MIN: ICD-10-PCS | Mod: S$PBB,,, | Performed by: NURSE PRACTITIONER

## 2022-07-13 PROCEDURE — 3008F BODY MASS INDEX DOCD: CPT | Mod: ,,, | Performed by: NURSE PRACTITIONER

## 2022-07-13 PROCEDURE — 1159F MED LIST DOCD IN RCRD: CPT | Mod: ,,, | Performed by: NURSE PRACTITIONER

## 2022-07-13 PROCEDURE — 1160F PR REVIEW ALL MEDS BY PRESCRIBER/CLIN PHARMACIST DOCUMENTED: ICD-10-PCS | Mod: ,,, | Performed by: NURSE PRACTITIONER

## 2022-07-13 PROCEDURE — 3079F DIAST BP 80-89 MM HG: CPT | Mod: ,,, | Performed by: NURSE PRACTITIONER

## 2022-07-13 PROCEDURE — 87086 CULTURE, URINE: ICD-10-PCS | Mod: ,,, | Performed by: CLINICAL MEDICAL LABORATORY

## 2022-07-13 PROCEDURE — 99214 OFFICE O/P EST MOD 30 MIN: CPT | Mod: PBBFAC | Performed by: NURSE PRACTITIONER

## 2022-07-13 PROCEDURE — 1159F PR MEDICATION LIST DOCUMENTED IN MEDICAL RECORD: ICD-10-PCS | Mod: ,,, | Performed by: NURSE PRACTITIONER

## 2022-07-13 PROCEDURE — 3075F SYST BP GE 130 - 139MM HG: CPT | Mod: ,,, | Performed by: NURSE PRACTITIONER

## 2022-07-13 PROCEDURE — 1160F RVW MEDS BY RX/DR IN RCRD: CPT | Mod: ,,, | Performed by: NURSE PRACTITIONER

## 2022-07-13 PROCEDURE — 3075F PR MOST RECENT SYSTOLIC BLOOD PRESS GE 130-139MM HG: ICD-10-PCS | Mod: ,,, | Performed by: NURSE PRACTITIONER

## 2022-07-13 PROCEDURE — 99214 OFFICE O/P EST MOD 30 MIN: CPT | Mod: S$PBB,,, | Performed by: NURSE PRACTITIONER

## 2022-07-13 PROCEDURE — 3008F PR BODY MASS INDEX (BMI) DOCUMENTED: ICD-10-PCS | Mod: ,,, | Performed by: NURSE PRACTITIONER

## 2022-07-13 RX ORDER — PREGABALIN 75 MG/1
1 CAPSULE ORAL NIGHTLY
COMMUNITY
Start: 2022-06-16 | End: 2023-02-06

## 2022-07-13 RX ORDER — CYCLOBENZAPRINE HCL 10 MG
10 TABLET ORAL NIGHTLY
COMMUNITY
Start: 2022-06-15 | End: 2022-07-26 | Stop reason: SDUPTHER

## 2022-07-13 NOTE — PROGRESS NOTES
Subjective:       Patient ID: Alda Gamboa is a 42 y.o. male.    Chief Complaint: Follow-up (6 week f/u. )    Mr. Gamboa is a pleasant 43 yo male who is here today for initial evaluation for acute prostatitis with his wife who is a [employer] employee (RN).  PMH includes recent treatment with immunosuppressant initiated for ankylosing spondylitis.  Patient states c/o frequency, nocturia with fever of 106.9 per spouses report on 5/17, 103.5 on 5/18.   WBC elevated to 12 at this time. He was treated with daily Rocephin, and a 10 day course of Bactrim DS po which was completed yesterday.  Urine culture grew >100,000 Klebsiella pneumoniae.  He states no fever since the 18th, he denies testicular or prostate pain, dysuria today, and reports he really improved overall after the Rocephin.  He had a semi boggy, mildly tender enlarged prostate on PE today.  (5/31/2022)---------------------------------------------------------------------    Mr. Gamboa is here today for 6 week sx recheck and f/u for acute bacterial Prostatitis.  Positive urine Cx grew >100,000 Klebsiella pneumoniae 5/18/2022 without hematuria, and was treated with on month of Doxycycline.  No PSA on records or received from Dr. Matt Grover who treats his for TRT, but he states he had been out of TRT for 5 months at last visit.    Acute bacterial prostatitis  reculture urine  UA, to confirm  Microhematuria has resolved with treatment.        Ankylosing spondylitis  Patient to alert Dr. Medel of UTI, Acute Prostatitis.     Review of Systems   Constitutional: Positive for fatigue. Negative for activity change, appetite change, chills, diaphoresis, fever and unexpected weight change.   Cardiovascular: Negative.    Gastrointestinal: Negative.    Genitourinary: Negative for decreased urine volume, difficulty urinating, dysuria, enuresis, flank pain, frequency, genital sores, hematuria, penile discharge, penile pain, penile swelling, scrotal swelling,  testicular pain and urgency.        Feeling of not emptying, weak stream, decreased urination with frequency resolved following 6 weeks of antx for underlying prostatitis.   Musculoskeletal: Positive for back pain.        Chronic   Skin: Negative.    Allergic/Immunologic: Negative.    Neurological: Negative.        Objective:      Physical Exam  Vitals and nursing note reviewed.   Constitutional:       General: He is not in acute distress.     Appearance: Normal appearance. He is normal weight. He is not ill-appearing, toxic-appearing or diaphoretic.   Eyes:      General: No scleral icterus.  Cardiovascular:      Rate and Rhythm: Normal rate.   Pulmonary:      Effort: Pulmonary effort is normal.   Abdominal:      General: Bowel sounds are normal. There is no distension.      Palpations: Abdomen is soft.      Tenderness: There is no abdominal tenderness. There is no right CVA tenderness, left CVA tenderness or guarding.   Musculoskeletal:      Right lower leg: No edema.      Left lower leg: No edema.   Skin:     General: Skin is warm and dry.      Coloration: Skin is not jaundiced or pale.      Findings: No rash.   Neurological:      Mental Status: He is alert and oriented to person, place, and time. Mental status is at baseline.   Psychiatric:         Mood and Affect: Mood normal.         Behavior: Behavior normal.         Thought Content: Thought content normal.         Judgment: Judgment normal.         Assessment:       1. Acute bacterial prostatitis    2. Urinary tract infection without hematuria, site unspecified    3. Testosterone deficiency in male    4. Ankylosing spondylitis, unspecified site of spine    5. Encounter for long-term (current) use of medications        Plan:       Acute bacterial prostatitis  · tx resolved nocturia and impotency    Testosterone deficiency in male  · Early morning testosterone, CBC, PSA   · Will restart topical TRT if indicated     Ankylosing spondylitis  · Chronic back pain,  difficulty staying asleep more than 2 hrs at a time.  · Had negative sleep apnea testing by Dr. Sow in the past.

## 2022-07-13 NOTE — ASSESSMENT & PLAN NOTE
· Chronic back pain, difficulty staying asleep more than 2 hrs at a time.  · Had negative sleep apnea testing by Dr. oSw in the past.

## 2022-07-14 ENCOUNTER — TELEPHONE (OUTPATIENT)
Dept: UROLOGY | Facility: CLINIC | Age: 42
End: 2022-07-14
Payer: COMMERCIAL

## 2022-07-14 NOTE — TELEPHONE ENCOUNTER
----- Message from Evangelina Becker sent at 7/14/2022  8:48 AM CDT -----  Regarding: call back  Pt called and said he couldn't make it today can he come tomorrow? I don't know what he is talking about. His call back number is [phone]  I called pt multiple times this morning and his line is busy.  I called Mary, emergency contact and told her that he can come to lab tomorrow or one day early next week if he needs too, just does not need to eat or drink anything before he comes to the lab for blood draw.  She voiced understanding and says she will tell him.

## 2022-07-15 LAB — UA COMPLETE W REFLEX CULTURE PNL UR: NO GROWTH

## 2022-07-18 ENCOUNTER — TELEPHONE (OUTPATIENT)
Dept: UROLOGY | Facility: CLINIC | Age: 42
End: 2022-07-18
Payer: COMMERCIAL

## 2022-07-18 DIAGNOSIS — E29.1 TESTOSTERONE DEFICIENCY IN MALE: ICD-10-CM

## 2022-07-18 DIAGNOSIS — N41.0 ACUTE BACTERIAL PROSTATITIS: Primary | ICD-10-CM

## 2022-07-18 NOTE — TELEPHONE ENCOUNTER
----- Message from BURTON Dong sent at 7/18/2022  7:42 AM CDT -----  -  PSA significantly elevated, which may just need more time to return to normal.  Have him returned PSA drawn prior to visit 2 mos to recheck - reviewed with Dr. Anaya who agrees with POC.  May just have significant early BPH.  #make sure to tell him_   no intercourse at least 24 hours prior to repeat PSA testing#  -  Testosterone is low normal.  Cannot safely restart TRT until PSA returns to normal range.    -  schedule f/u 2 mos   I called and spoke with pt and relayed this information to him.  He voiced understanding and said he is taking down notes.  Got his 2 month follow up appointment set for 9-15-22 at 8:30am and he is to have PSA drawn few days prior to this appointment.  He will keep his 4 week follow up appointment because he did have some pain in that area and like bottom of testicle over the weekend, and this is first time he has paid attention to pain there, and it was about a 2 or 3 on the pain scale.  I will relay this to NAVYA Monzon.

## 2022-07-26 ENCOUNTER — OFFICE VISIT (OUTPATIENT)
Dept: FAMILY MEDICINE | Facility: CLINIC | Age: 42
End: 2022-07-26
Payer: COMMERCIAL

## 2022-07-26 VITALS
HEIGHT: 72 IN | RESPIRATION RATE: 16 BRPM | DIASTOLIC BLOOD PRESSURE: 80 MMHG | HEART RATE: 88 BPM | BODY MASS INDEX: 23.43 KG/M2 | WEIGHT: 173 LBS | SYSTOLIC BLOOD PRESSURE: 110 MMHG

## 2022-07-26 DIAGNOSIS — F32.A DEPRESSION, UNSPECIFIED DEPRESSION TYPE: ICD-10-CM

## 2022-07-26 DIAGNOSIS — Z79.899 ENCOUNTER FOR LONG-TERM (CURRENT) USE OF OTHER MEDICATIONS: ICD-10-CM

## 2022-07-26 DIAGNOSIS — M45.9 ANKYLOSING SPONDYLITIS, UNSPECIFIED SITE OF SPINE: ICD-10-CM

## 2022-07-26 DIAGNOSIS — F41.9 ANXIETY: Primary | ICD-10-CM

## 2022-07-26 DIAGNOSIS — K21.9 GASTROESOPHAGEAL REFLUX DISEASE WITHOUT ESOPHAGITIS: ICD-10-CM

## 2022-07-26 DIAGNOSIS — N52.9 ERECTILE DYSFUNCTION, UNSPECIFIED ERECTILE DYSFUNCTION TYPE: ICD-10-CM

## 2022-07-26 LAB
CTP QC/QA: YES
POC (AMP) AMPHETAMINE: NEGATIVE
POC (BAR) BARBITURATES: NEGATIVE
POC (BUP) BUPRENORPHINE: NEGATIVE
POC (BZO) BENZODIAZEPINES: NEGATIVE
POC (COC) COCAINE: NEGATIVE
POC (MDMA) METHYLENEDIOXYMETHAMPHETAMINE 3,4: NEGATIVE
POC (MET) METHAMPHETAMINE: NEGATIVE
POC (MOP) OPIATES: NEGATIVE
POC (MTD) METHADONE: NEGATIVE
POC (OXY) OXYCODONE: NEGATIVE
POC (PCP) PHENCYCLIDINE: NEGATIVE
POC (TCA) TRICYCLIC ANTIDEPRESSANTS: NORMAL
POC TEMPERATURE (URINE): 92
POC THC: NEGATIVE

## 2022-07-26 PROCEDURE — 1159F MED LIST DOCD IN RCRD: CPT | Mod: ,,, | Performed by: FAMILY MEDICINE

## 2022-07-26 PROCEDURE — 3008F PR BODY MASS INDEX (BMI) DOCUMENTED: ICD-10-PCS | Mod: ,,, | Performed by: FAMILY MEDICINE

## 2022-07-26 PROCEDURE — 1160F RVW MEDS BY RX/DR IN RCRD: CPT | Mod: ,,, | Performed by: FAMILY MEDICINE

## 2022-07-26 PROCEDURE — 80305 DRUG TEST PRSMV DIR OPT OBS: CPT | Mod: QW,,, | Performed by: FAMILY MEDICINE

## 2022-07-26 PROCEDURE — 80305 POCT URINE DRUG SCREEN PRESUMP: ICD-10-PCS | Mod: QW,,, | Performed by: FAMILY MEDICINE

## 2022-07-26 PROCEDURE — 1159F PR MEDICATION LIST DOCUMENTED IN MEDICAL RECORD: ICD-10-PCS | Mod: ,,, | Performed by: FAMILY MEDICINE

## 2022-07-26 PROCEDURE — 3074F PR MOST RECENT SYSTOLIC BLOOD PRESSURE < 130 MM HG: ICD-10-PCS | Mod: ,,, | Performed by: FAMILY MEDICINE

## 2022-07-26 PROCEDURE — 3079F PR MOST RECENT DIASTOLIC BLOOD PRESSURE 80-89 MM HG: ICD-10-PCS | Mod: ,,, | Performed by: FAMILY MEDICINE

## 2022-07-26 PROCEDURE — 1160F PR REVIEW ALL MEDS BY PRESCRIBER/CLIN PHARMACIST DOCUMENTED: ICD-10-PCS | Mod: ,,, | Performed by: FAMILY MEDICINE

## 2022-07-26 PROCEDURE — 3074F SYST BP LT 130 MM HG: CPT | Mod: ,,, | Performed by: FAMILY MEDICINE

## 2022-07-26 PROCEDURE — 99214 OFFICE O/P EST MOD 30 MIN: CPT | Mod: ,,, | Performed by: FAMILY MEDICINE

## 2022-07-26 PROCEDURE — 3008F BODY MASS INDEX DOCD: CPT | Mod: ,,, | Performed by: FAMILY MEDICINE

## 2022-07-26 PROCEDURE — 3079F DIAST BP 80-89 MM HG: CPT | Mod: ,,, | Performed by: FAMILY MEDICINE

## 2022-07-26 PROCEDURE — 99214 PR OFFICE/OUTPT VISIT, EST, LEVL IV, 30-39 MIN: ICD-10-PCS | Mod: ,,, | Performed by: FAMILY MEDICINE

## 2022-07-26 RX ORDER — CLONAZEPAM 1 MG/1
1 TABLET ORAL 2 TIMES DAILY PRN
Qty: 30 TABLET | Refills: 2 | Status: SHIPPED | OUTPATIENT
Start: 2022-07-26 | End: 2022-11-18 | Stop reason: SDUPTHER

## 2022-07-26 RX ORDER — SERTRALINE HYDROCHLORIDE 100 MG/1
TABLET, FILM COATED ORAL
Qty: 90 TABLET | Refills: 3 | Status: SHIPPED | OUTPATIENT
Start: 2022-07-26 | End: 2022-11-18 | Stop reason: SDUPTHER

## 2022-07-26 RX ORDER — CLONAZEPAM 1 MG/1
1 TABLET ORAL 2 TIMES DAILY PRN
COMMUNITY
End: 2022-07-26 | Stop reason: SDUPTHER

## 2022-07-26 RX ORDER — CYCLOBENZAPRINE HCL 10 MG
10 TABLET ORAL NIGHTLY
Qty: 30 TABLET | Refills: 11 | Status: SHIPPED | OUTPATIENT
Start: 2022-07-26 | End: 2022-11-18 | Stop reason: SDUPTHER

## 2022-07-26 RX ORDER — DEXLANSOPRAZOLE 60 MG/1
1 CAPSULE, DELAYED RELEASE ORAL DAILY
Qty: 30 CAPSULE | Refills: 11 | Status: SHIPPED | OUTPATIENT
Start: 2022-07-26 | End: 2022-11-18 | Stop reason: SDUPTHER

## 2022-07-26 RX ORDER — TADALAFIL 20 MG/1
20 TABLET ORAL DAILY
Qty: 10 TABLET | Refills: 11 | Status: SHIPPED | OUTPATIENT
Start: 2022-07-26 | End: 2022-11-18 | Stop reason: SDUPTHER

## 2022-07-26 NOTE — PROGRESS NOTES
New Osman MD        PATIENT NAME: Alda Gamboa  : 1980  DATE: 22  MRN: [id number]      Billing Provider: New Osman MD  Level of Service: LA OFFICE/OUTPT VISIT, EST, LEVL IV, 30-39 MIN  Patient PCP Information     Provider PCP Type    New Osman MD General          Reason for Visit / Chief Complaint: Anxiety (3 month med check.)       Update PCP  Update Chief Complaint         History of Present Illness / Problem Focused Workflow     Alda Gamboa presents to the clinic with Anxiety (3 month med check.)     Had prostatitis in May and sees urology.  No T since ; Psa is high 8.9 Depressed with loss of interest.      Anxiety  Symptoms include decreased concentration. Patient reports no chest pain, confusion, dizziness, nausea, nervous/anxious behavior or palpitations.           Review of Systems     Review of Systems   Constitutional: Positive for fatigue. Negative for activity change, appetite change, fever and unexpected weight change.   HENT: Negative for congestion, rhinorrhea, sinus pressure, sinus pain, sore throat and trouble swallowing.    Eyes: Negative for photophobia, pain, discharge and visual disturbance.   Respiratory: Negative for cough, chest tightness, wheezing and stridor.    Cardiovascular: Negative for chest pain, palpitations and leg swelling.   Gastrointestinal: Negative for abdominal pain, blood in stool, constipation, diarrhea and nausea.   Endocrine: Negative for polydipsia, polyphagia and polyuria.   Genitourinary: Negative for difficulty urinating, flank pain and hematuria.   Musculoskeletal: Negative for arthralgias and neck pain.   Skin: Negative for rash.   Allergic/Immunologic: Negative for food allergies.   Neurological: Negative for dizziness, tremors, seizures, syncope, weakness (global weakness) and headaches.   Psychiatric/Behavioral: Positive for decreased concentration. Negative for behavioral problems, confusion, dysphoric mood and  hallucinations. The patient is not nervous/anxious.         Medical / Social / Family History     Past Medical History:   Diagnosis Date    Acute bacterial prostatitis 5/31/2022    Positive Cx for UTI with scant hematuria Treated with 10 day course of Bactrim DS, completed yesterday    Ankylosing spondylitis 5/31/2022    Patient takes immunosuppression - recently changed to Cimzia, Taltz Followed by Dr. Medel for this    Back pain     Neck pain     Spondylosis        Past Surgical History:   Procedure Laterality Date    CYST REMOVAL Right     shoulder    HERNIA REPAIR      LUMBAR DISCECTOMY  02/2018    L 4 L5    TONSILLECTOMY         Social History    reports that he has quit smoking. He has never used smokeless tobacco. He reports previous alcohol use. He reports previous drug use.    Family History  's family history includes Cancer in his maternal grandfather; Lung cancer in his father; Pacemaker/defibrilator in his mother.    Medications and Allergies     Medications  Outpatient Medications Marked as Taking for the 7/26/22 encounter (Office Visit) with New Osman MD   Medication Sig Dispense Refill    diclofenac (VOLTAREN) 75 MG EC tablet Take 75 mg by mouth 2 (two) times daily as needed.      ixekizumab (TALTZ AUTOINJECTOR SUBQ) Inject 80 mg into the skin every 28 days. 80 mg per 1 ml autoinjection.      pregabalin (LYRICA) 75 MG capsule Take 1 capsule by mouth nightly.      [DISCONTINUED] clonazePAM (KLONOPIN) 1 MG tablet Take 1 mg by mouth 2 (two) times daily as needed for Anxiety.      [DISCONTINUED] cyclobenzaprine (FLEXERIL) 10 MG tablet Take 10 mg by mouth every evening.      [DISCONTINUED] DEXILANT 60 mg capsule Take 1 capsule (60 mg total) by mouth once daily. 90 capsule 3       Allergies  Review of patient's allergies indicates:   Allergen Reactions    Pcn [penicillins] Hives and Swelling     Throat swelling    Neurontin [gabapentin]        Physical Examination     Vitals:     07/26/22 1530   BP: 110/80   Pulse: 88   Resp: 16     Physical Exam  Constitutional:       General: He is not in acute distress.     Appearance: Normal appearance.   HENT:      Head: Normocephalic.      Right Ear: Tympanic membrane and ear canal normal.      Left Ear: Tympanic membrane and ear canal normal.      Nose: Nose normal.      Mouth/Throat:      Mouth: Mucous membranes are moist.      Pharynx: No oropharyngeal exudate.   Eyes:      Extraocular Movements: Extraocular movements intact.      Pupils: Pupils are equal, round, and reactive to light.   Cardiovascular:      Rate and Rhythm: Normal rate and regular rhythm.      Heart sounds: No murmur heard.  Pulmonary:      Effort: Pulmonary effort is normal.      Breath sounds: Normal breath sounds. No wheezing.   Abdominal:      General: Abdomen is flat. Bowel sounds are normal.      Palpations: Abdomen is soft.      Hernia: No hernia is present.   Musculoskeletal:         General: Normal range of motion.      Cervical back: Normal range of motion and neck supple.      Right lower leg: No edema.      Left lower leg: No edema.   Lymphadenopathy:      Cervical: No cervical adenopathy.   Skin:     General: Skin is warm and dry.      Coloration: Skin is not jaundiced.      Findings: No lesion.   Neurological:      General: No focal deficit present.      Mental Status: He is alert and oriented to person, place, and time.      Cranial Nerves: No cranial nerve deficit.      Gait: Gait normal.   Psychiatric:         Mood and Affect: Mood normal.         Behavior: Behavior normal.         Judgment: Judgment normal.          Assessment and Plan (including Health Maintenance)      Problem List  Smart Sets  Document Outside HM   :    Plan:   Anxiety  -     clonazePAM (KLONOPIN) 1 MG tablet; Take 1 tablet (1 mg total) by mouth 2 (two) times daily as needed for Anxiety.  Dispense: 30 tablet; Refill: 2    Gastroesophageal reflux disease without esophagitis  -      dexlansoprazole (DEXILANT) 60 mg capsule; Take 1 capsule (60 mg total) by mouth once daily.  Dispense: 30 capsule; Refill: 11    Erectile dysfunction, unspecified erectile dysfunction type  -     tadalafiL (CIALIS) 20 MG Tab; Take 1 tablet (20 mg total) by mouth once daily.  Dispense: 10 tablet; Refill: 11    Encounter for long-term (current) use of other medications  -     POCT Urine Drug Screen Presump    Ankylosing spondylitis, unspecified site of spine  -     cyclobenzaprine (FLEXERIL) 10 MG tablet; Take 1 tablet (10 mg total) by mouth every evening.  Dispense: 30 tablet; Refill: 11    Depression, unspecified depression type  -     sertraline (ZOLOFT) 100 MG tablet; 1/2 tab po q AM x 7 days, the 1 tab po daily  Dispense: 90 tablet; Refill: 3           Health Maintenance Due   Topic Date Due    Hepatitis C Screening  Never done    COVID-19 Vaccine (1) Never done    HIV Screening  Never done    TETANUS VACCINE  Never done       Problem List Items Addressed This Visit        Orthopedic    Ankylosing spondylitis    Overview     · Patient takes immunosuppression - recently changed to Cimzia, Taltz  · Followed by Dr. Medel for this           Relevant Medications    cyclobenzaprine (FLEXERIL) 10 MG tablet      Other Visit Diagnoses     Anxiety    -  Primary    Relevant Medications    clonazePAM (KLONOPIN) 1 MG tablet    Gastroesophageal reflux disease without esophagitis        Relevant Medications    dexlansoprazole (DEXILANT) 60 mg capsule    Erectile dysfunction, unspecified erectile dysfunction type        Relevant Medications    tadalafiL (CIALIS) 20 MG Tab    Encounter for long-term (current) use of other medications        Relevant Orders    POCT Urine Drug Screen Presump (Completed)    Depression, unspecified depression type        Relevant Medications    sertraline (ZOLOFT) 100 MG tablet          Health Maintenance Topics with due status: Not Due       Topic Last Completion Date    Lipid Panel 08/09/2021     Influenza Vaccine Not Due       Future Appointments   Date Time Provider Department Center   8/9/2022  1:00 PM [facility]   8/12/2022  8:30 AM [facility]   8/26/2022  9:30 AM [facility]   9/15/2022  8:30 AM [facility] Dong [facility]        There are no Patient Instructions on file for this visit.  Follow up in about 3 months (around 10/26/2022) for routine followup.     Signature:  New Osman MD      Date of encounter: 7/26/22

## 2022-08-09 ENCOUNTER — OFFICE VISIT (OUTPATIENT)
Dept: FAMILY MEDICINE | Facility: CLINIC | Age: 42
End: 2022-08-09
Payer: COMMERCIAL

## 2022-08-09 VITALS
RESPIRATION RATE: 18 BRPM | DIASTOLIC BLOOD PRESSURE: 65 MMHG | HEART RATE: 69 BPM | WEIGHT: 187.38 LBS | BODY MASS INDEX: 25.38 KG/M2 | OXYGEN SATURATION: 99 % | HEIGHT: 72 IN | SYSTOLIC BLOOD PRESSURE: 125 MMHG

## 2022-08-09 DIAGNOSIS — Z00.00 ROUTINE GENERAL MEDICAL EXAMINATION AT A HEALTH CARE FACILITY: Primary | ICD-10-CM

## 2022-08-09 DIAGNOSIS — Z13.1 SCREENING FOR DIABETES MELLITUS: ICD-10-CM

## 2022-08-09 DIAGNOSIS — Z13.220 SCREENING FOR LIPOID DISORDERS: ICD-10-CM

## 2022-08-09 DIAGNOSIS — Z11.59 ENCOUNTER FOR HEPATITIS C SCREENING TEST FOR LOW RISK PATIENT: ICD-10-CM

## 2022-08-09 PROCEDURE — 99396 PREV VISIT EST AGE 40-64: CPT | Mod: ,,, | Performed by: FAMILY MEDICINE

## 2022-08-09 PROCEDURE — 3078F PR MOST RECENT DIASTOLIC BLOOD PRESSURE < 80 MM HG: ICD-10-PCS | Mod: ,,, | Performed by: FAMILY MEDICINE

## 2022-08-09 PROCEDURE — 1160F RVW MEDS BY RX/DR IN RCRD: CPT | Mod: ,,, | Performed by: FAMILY MEDICINE

## 2022-08-09 PROCEDURE — 3008F PR BODY MASS INDEX (BMI) DOCUMENTED: ICD-10-PCS | Mod: ,,, | Performed by: FAMILY MEDICINE

## 2022-08-09 PROCEDURE — 3078F DIAST BP <80 MM HG: CPT | Mod: ,,, | Performed by: FAMILY MEDICINE

## 2022-08-09 PROCEDURE — 99396 PR PREVENTIVE VISIT,EST,40-64: ICD-10-PCS | Mod: ,,, | Performed by: FAMILY MEDICINE

## 2022-08-09 PROCEDURE — 3074F SYST BP LT 130 MM HG: CPT | Mod: ,,, | Performed by: FAMILY MEDICINE

## 2022-08-09 PROCEDURE — 1159F MED LIST DOCD IN RCRD: CPT | Mod: ,,, | Performed by: FAMILY MEDICINE

## 2022-08-09 PROCEDURE — 1159F PR MEDICATION LIST DOCUMENTED IN MEDICAL RECORD: ICD-10-PCS | Mod: ,,, | Performed by: FAMILY MEDICINE

## 2022-08-09 PROCEDURE — 3008F BODY MASS INDEX DOCD: CPT | Mod: ,,, | Performed by: FAMILY MEDICINE

## 2022-08-09 PROCEDURE — 1160F PR REVIEW ALL MEDS BY PRESCRIBER/CLIN PHARMACIST DOCUMENTED: ICD-10-PCS | Mod: ,,, | Performed by: FAMILY MEDICINE

## 2022-08-09 PROCEDURE — 3074F PR MOST RECENT SYSTOLIC BLOOD PRESSURE < 130 MM HG: ICD-10-PCS | Mod: ,,, | Performed by: FAMILY MEDICINE

## 2022-08-09 NOTE — PROGRESS NOTES
New Osman MD        PATIENT NAME: Alda Gamboa  : 1980  DATE: 22  MRN: [id number]      Billing Provider: New Osman MD  Level of Service: [place] PREVENTIVE VISIT,EST,40-64  Patient PCP Information     Provider PCP Type    New Osman MD General          Reason for Visit / Chief Complaint: Healthy You (Primary Dx for HY visit Z00.00/Patient states that no one told him to fast for labs so he will come back for lab only)       Update PCP  Update Chief Complaint         History of Present Illness / Problem Focused Workflow     Alda Gamboa presents to the clinic with Healthy You (Primary Dx for HY visit Z00.00/Patient states that no one told him to fast for labs so he will come back for lab only)     Healthy You.  One of meds making him groggy.        Review of Systems     Review of Systems   Constitutional: Negative for activity change, appetite change, fever and unexpected weight change.   HENT: Negative for congestion, rhinorrhea, sinus pressure, sinus pain, sore throat and trouble swallowing.    Eyes: Negative for photophobia, pain, discharge and visual disturbance.   Respiratory: Negative for cough, chest tightness, wheezing and stridor.    Cardiovascular: Negative for chest pain, palpitations and leg swelling.   Gastrointestinal: Negative for abdominal pain, blood in stool, constipation, diarrhea and nausea.   Endocrine: Negative for polydipsia, polyphagia and polyuria.   Genitourinary: Negative for difficulty urinating, flank pain and hematuria.   Musculoskeletal: Negative for arthralgias and neck pain.   Skin: Negative for rash.   Allergic/Immunologic: Negative for food allergies.   Neurological: Negative for dizziness, tremors, seizures, syncope, weakness (global weakness) and headaches.   Psychiatric/Behavioral: Negative for behavioral problems, confusion, decreased concentration, dysphoric mood and hallucinations. The patient is not nervous/anxious.         Medical / Social /  Family History     Past Medical History:   Diagnosis Date    Acute bacterial prostatitis 5/31/2022    Positive Cx for UTI with scant hematuria Treated with 10 day course of Bactrim DS, completed yesterday    Ankylosing spondylitis 5/31/2022    Patient takes immunosuppression - recently changed to Cimzia, Taltz Followed by Dr. Medel for this    Back pain     Neck pain     Spondylosis        Past Surgical History:   Procedure Laterality Date    CYST REMOVAL Right     shoulder    HERNIA REPAIR      LUMBAR DISCECTOMY  02/2018    L 4 L5    TONSILLECTOMY         Social History    reports that he has quit smoking. He has never used smokeless tobacco. He reports previous alcohol use. He reports previous drug use.    Family History  's family history includes Cancer in his maternal grandfather; Lung cancer in his father; Pacemaker/defibrilator in his mother.    Medications and Allergies     Medications  No outpatient medications have been marked as taking for the 8/9/22 encounter (Office Visit) with New Osman MD.       Allergies  Review of patient's allergies indicates:   Allergen Reactions    Pcn [penicillins] Hives and Swelling     Throat swelling    Neurontin [gabapentin]        Physical Examination     Vitals:    08/09/22 1318   BP: 125/65   Pulse: 69   Resp: 18     Physical Exam  Constitutional:       General: He is not in acute distress.     Appearance: Normal appearance.   HENT:      Head: Normocephalic.      Right Ear: Tympanic membrane and ear canal normal.      Left Ear: Tympanic membrane and ear canal normal.      Nose: Nose normal.      Mouth/Throat:      Mouth: Mucous membranes are moist.      Pharynx: No oropharyngeal exudate.   Eyes:      Extraocular Movements: Extraocular movements intact.      Pupils: Pupils are equal, round, and reactive to light.   Cardiovascular:      Rate and Rhythm: Normal rate and regular rhythm.      Heart sounds: No murmur heard.  Pulmonary:      Effort:  Pulmonary effort is normal.      Breath sounds: Normal breath sounds. No wheezing.   Abdominal:      General: Abdomen is flat. Bowel sounds are normal.      Palpations: Abdomen is soft.      Hernia: No hernia is present.   Musculoskeletal:         General: Normal range of motion.      Cervical back: Normal range of motion and neck supple.      Right lower leg: No edema.      Left lower leg: No edema.   Lymphadenopathy:      Cervical: No cervical adenopathy.   Skin:     General: Skin is warm and dry.      Coloration: Skin is not jaundiced.      Findings: No lesion.   Neurological:      General: No focal deficit present.      Mental Status: He is alert and oriented to person, place, and time.      Cranial Nerves: No cranial nerve deficit.      Gait: Gait normal.   Psychiatric:         Mood and Affect: Mood normal.         Behavior: Behavior normal.         Judgment: Judgment normal.          Assessment and Plan (including Health Maintenance)      Problem List  Smart Sets  Document Outside HM   :    Plan:   Routine general medical examination at a health care facility    Encounter for hepatitis C screening test for low risk patient  -     Hepatitis C Antibody; Future; Expected date: 08/09/2022    Screening for diabetes mellitus  -     Glucose, Fasting; Future; Expected date: 08/09/2022    Screening for lipoid disorders  -     Lipid Panel; Future; Expected date: 02/09/2023           There are no preventive care reminders to display for this patient.    Problem List Items Addressed This Visit    None     Visit Diagnoses     Routine general medical examination at a health care facility    -  Primary    Encounter for hepatitis C screening test for low risk patient        Relevant Orders    Hepatitis C Antibody (Completed)    Screening for diabetes mellitus        Relevant Orders    Glucose, Fasting (Completed)    Screening for lipoid disorders        Relevant Orders    Lipid Panel (Completed)          Health Maintenance  Topics with due status: Not Due       Topic Last Completion Date    TETANUS VACCINE 08/04/2020    Lipid Panel 08/10/2022    Influenza Vaccine Not Due       Future Appointments   Date Time Provider Department Center   8/12/2022  8:30 AM [facility] Dong [facility]   8/26/2022  9:30 AM New Osman MD [facility]   9/15/2022  8:30 AM [facility]   7/5/2023  8:15 AM New Osman MD [facility]      Health goals for the coming year:  Avoid adding table salt to foods  Exercise 30 min daily 5 days/week  Develop good social support system    There are no Patient Instructions on file for this visit.  Follow up in about 6 months (around 2/9/2023) for routine followup.     Signature:  New Osman MD      Date of encounter: 8/9/22

## 2022-08-12 ENCOUNTER — OFFICE VISIT (OUTPATIENT)
Dept: UROLOGY | Facility: CLINIC | Age: 42
End: 2022-08-12
Payer: COMMERCIAL

## 2022-08-12 VITALS
SYSTOLIC BLOOD PRESSURE: 120 MMHG | BODY MASS INDEX: 24.79 KG/M2 | HEART RATE: 68 BPM | DIASTOLIC BLOOD PRESSURE: 86 MMHG | TEMPERATURE: 98 F | HEIGHT: 72 IN | OXYGEN SATURATION: 97 % | WEIGHT: 183 LBS

## 2022-08-12 DIAGNOSIS — R97.20 ELEVATED PROSTATE SPECIFIC ANTIGEN (PSA): Primary | ICD-10-CM

## 2022-08-12 DIAGNOSIS — Z01.812 ENCOUNTER FOR PREPROCEDURAL LABORATORY EXAMINATION: ICD-10-CM

## 2022-08-12 DIAGNOSIS — N41.0 ACUTE BACTERIAL PROSTATITIS: ICD-10-CM

## 2022-08-12 DIAGNOSIS — M45.9 ANKYLOSING SPONDYLITIS, UNSPECIFIED SITE OF SPINE: ICD-10-CM

## 2022-08-12 DIAGNOSIS — E29.1 TESTOSTERONE DEFICIENCY IN MALE: ICD-10-CM

## 2022-08-12 LAB
BILIRUB UR QL STRIP: NEGATIVE
CLARITY UR: CLEAR
COLOR UR: NORMAL
GLUCOSE UR STRIP-MCNC: NORMAL MG/DL
KETONES UR STRIP-SCNC: NEGATIVE MG/DL
LEUKOCYTE ESTERASE UR QL STRIP: NEGATIVE
NITRITE UR QL STRIP: NEGATIVE
PH UR STRIP: 5.5 PH UNITS
PROT UR QL STRIP: NEGATIVE
RBC # UR STRIP: NEGATIVE /UL
SP GR UR STRIP: 1.02
UROBILINOGEN UR STRIP-ACNC: NORMAL MG/DL

## 2022-08-12 PROCEDURE — 1160F PR REVIEW ALL MEDS BY PRESCRIBER/CLIN PHARMACIST DOCUMENTED: ICD-10-PCS | Mod: ,,, | Performed by: NURSE PRACTITIONER

## 2022-08-12 PROCEDURE — 99214 OFFICE O/P EST MOD 30 MIN: CPT | Mod: PBBFAC | Performed by: NURSE PRACTITIONER

## 2022-08-12 PROCEDURE — 1160F RVW MEDS BY RX/DR IN RCRD: CPT | Mod: ,,, | Performed by: NURSE PRACTITIONER

## 2022-08-12 PROCEDURE — 1159F MED LIST DOCD IN RCRD: CPT | Mod: ,,, | Performed by: NURSE PRACTITIONER

## 2022-08-12 PROCEDURE — 3079F PR MOST RECENT DIASTOLIC BLOOD PRESSURE 80-89 MM HG: ICD-10-PCS | Mod: ,,, | Performed by: NURSE PRACTITIONER

## 2022-08-12 PROCEDURE — 1159F PR MEDICATION LIST DOCUMENTED IN MEDICAL RECORD: ICD-10-PCS | Mod: ,,, | Performed by: NURSE PRACTITIONER

## 2022-08-12 PROCEDURE — 81003 URINALYSIS: ICD-10-PCS | Mod: QW,,, | Performed by: CLINICAL MEDICAL LABORATORY

## 2022-08-12 PROCEDURE — 3008F PR BODY MASS INDEX (BMI) DOCUMENTED: ICD-10-PCS | Mod: ,,, | Performed by: NURSE PRACTITIONER

## 2022-08-12 PROCEDURE — 3074F PR MOST RECENT SYSTOLIC BLOOD PRESSURE < 130 MM HG: ICD-10-PCS | Mod: ,,, | Performed by: NURSE PRACTITIONER

## 2022-08-12 PROCEDURE — 87086 URINE CULTURE/COLONY COUNT: CPT | Mod: ,,, | Performed by: CLINICAL MEDICAL LABORATORY

## 2022-08-12 PROCEDURE — 3079F DIAST BP 80-89 MM HG: CPT | Mod: ,,, | Performed by: NURSE PRACTITIONER

## 2022-08-12 PROCEDURE — 3008F BODY MASS INDEX DOCD: CPT | Mod: ,,, | Performed by: NURSE PRACTITIONER

## 2022-08-12 PROCEDURE — 81003 URINALYSIS AUTO W/O SCOPE: CPT | Mod: QW,,, | Performed by: CLINICAL MEDICAL LABORATORY

## 2022-08-12 PROCEDURE — 99214 PR OFFICE/OUTPT VISIT, EST, LEVL IV, 30-39 MIN: ICD-10-PCS | Mod: S$PBB,,, | Performed by: NURSE PRACTITIONER

## 2022-08-12 PROCEDURE — 99214 OFFICE O/P EST MOD 30 MIN: CPT | Mod: S$PBB,,, | Performed by: NURSE PRACTITIONER

## 2022-08-12 PROCEDURE — 87086 CULTURE, URINE: ICD-10-PCS | Mod: ,,, | Performed by: CLINICAL MEDICAL LABORATORY

## 2022-08-12 PROCEDURE — 3074F SYST BP LT 130 MM HG: CPT | Mod: ,,, | Performed by: NURSE PRACTITIONER

## 2022-08-12 NOTE — PROGRESS NOTES
Subjective:       Patient ID: Alda Gamboa is a 42 y.o. male.    Chief Complaint: Other (4 week f/u for bacterial prostatitis)    Mr. Gamboa is a pleasant 43 yo male who is here today for initial evaluation for acute prostatitis with his wife who is a [employer] employee (RN).  PMH includes recent treatment with immunosuppressant initiated for ankylosing spondylitis.  Patient states c/o frequency, nocturia with fever of 106.9 per spouses report on 5/17, 103.5 on 5/18.   WBC elevated to 12 at this time. He was treated with daily Rocephin, and a 10 day course of Bactrim DS po which was completed yesterday.  Urine culture grew >100,000 Klebsiella pneumoniae.  He states no fever since the 18th, he denies testicular or prostate pain, dysuria today, and reports he really improved overall after the Rocephin.  He had a semi boggy, mildly tender enlarged prostate on PE today.  (5/31/2022)---------------------------------------------------------------------    Mr. Gamboa is here today for 6 week sx recheck and f/u for acute bacterial Prostatitis.  Positive urine Cx grew >100,000 Klebsiella pneumoniae 5/18/2022 without hematuria, and was treated with on month of Doxycycline.  No PSA on records or received from Dr. Matt Grover who treats his for TRT, but he states he had been out of TRT for 5 months at last visit.    Acute bacterial prostatitis  reculture urine  UA, to confirm  Microhematuria has resolved with treatment.    Ankylosing spondylitis  Patient to alert Dr. Medel of UTI, Acute Prostatitis.   (7/13/2022)----------------------------------------------------------------------------------------------------      Mr. Gamboa returns today for f/u of his testosterone deficiency andand elevated PSA s/p 4 years of TRT .  TRT has been held b/c of elevated PSA values for over 6 mos now.  He returns today with repeat PSA of 7.600 ( which was drawn one month earlier than planned by PCP).  Course of antx for acute  "bacterial prostatitis, which presumably was effecting elevated levels.  Low testosterone levels are also likely to be effected by his insomnia r/t uncontrolled pain from Ankylosing Spondylitis.  Negative sleep apnea negative per his history last visit.  C/o prostadynia which was states was a "pressure only".  Also reports severe suprapubic pain one night which resolved on it's own in Feb or March.    PSA RESULTS:                                  PSA                      7.600 (H)           08/10/2022                 PSA                      8.580 (H)           07/15/2022            Testosterone (ng/dL)       Date                     Value                 07/15/2022               261.0            (8/12/2022)----------------------------------------------------------------------------------------------    Review of Systems   Constitutional: Negative for activity change, appetite change, chills, diaphoresis, fatigue, fever and unexpected weight change.   Cardiovascular: Negative.    Gastrointestinal: Negative.    Genitourinary: Negative for decreased urine volume, difficulty urinating, dysuria, enuresis, flank pain, frequency, genital sores, hematuria, penile discharge, penile pain, penile swelling, scrotal swelling, testicular pain and urgency.        Feeling of not emptying, weak stream, decreased urination with frequency resolved following 6 weeks of antx for underlying prostatitis.   Musculoskeletal: Positive for back pain.        Chronic   Skin: Negative.    Neurological: Negative.        Objective:      Physical Exam  Vitals and nursing note reviewed. Exam conducted with a chaperone present (spouse present for exam).   Constitutional:       General: He is not in acute distress.     Appearance: Normal appearance. He is normal weight. He is not ill-appearing, toxic-appearing or diaphoretic.   HENT:      Mouth/Throat:      Pharynx: Oropharynx is clear.   Eyes:      General: No scleral icterus.        Right eye: No " discharge.         Left eye: No discharge.   Cardiovascular:      Rate and Rhythm: Normal rate.   Pulmonary:      Effort: Pulmonary effort is normal.   Abdominal:      General: There is no distension.      Palpations: Abdomen is soft.      Tenderness: There is no abdominal tenderness. There is no right CVA tenderness or left CVA tenderness.   Musculoskeletal:      Right lower leg: No edema.      Left lower leg: No edema.   Skin:     General: Skin is warm and dry.      Coloration: Skin is not jaundiced or pale.      Findings: No rash.   Neurological:      Mental Status: He is alert and oriented to person, place, and time. Mental status is at baseline.   Psychiatric:         Mood and Affect: Mood normal.         Behavior: Behavior normal.         Thought Content: Thought content normal.         Judgment: Judgment normal.         Assessment:       1. Elevated prostate specific antigen (PSA)    2. Acute bacterial prostatitis    3. Ankylosing spondylitis, unspecified site of spine    4. Testosterone deficiency in male    5. Encounter for preprocedural laboratory examination        Plan:       Testosterone deficiency in male  · TRT contraindicated     Acute bacterial prostatitis  · Symptoms improved on antx    Elevated prostate specific antigen (PSA)  · Patient elects to proceed with MRI Prostate

## 2022-08-14 LAB — UA COMPLETE W REFLEX CULTURE PNL UR: NO GROWTH

## 2022-08-19 ENCOUNTER — TELEPHONE (OUTPATIENT)
Dept: UROLOGY | Facility: CLINIC | Age: 42
End: 2022-08-19
Payer: COMMERCIAL

## 2022-08-19 DIAGNOSIS — R97.20 ELEVATED PROSTATE SPECIFIC ANTIGEN (PSA): ICD-10-CM

## 2022-08-19 DIAGNOSIS — Z01.812 ENCOUNTER FOR PREPROCEDURAL LABORATORY EXAMINATION: Primary | ICD-10-CM

## 2022-08-19 DIAGNOSIS — Z98.890 H/O METAL REMOVED FROM EYE: Primary | ICD-10-CM

## 2022-08-19 NOTE — TELEPHONE ENCOUNTER
I called and spoke with the pt and informed him of his MRI of prostate is scheduled for 0=8=4645 at 8:30am, need to get X-ray of orbits done few days before and also need blood drawn few days before for serum creatinine level.  Orders in system.  Pt voiced understanding and says he is witting it down.

## 2022-08-24 ENCOUNTER — OFFICE VISIT (OUTPATIENT)
Dept: FAMILY MEDICINE | Facility: CLINIC | Age: 42
End: 2022-08-24
Payer: COMMERCIAL

## 2022-08-24 VITALS
RESPIRATION RATE: 18 BRPM | WEIGHT: 184.81 LBS | DIASTOLIC BLOOD PRESSURE: 62 MMHG | OXYGEN SATURATION: 97 % | BODY MASS INDEX: 25.03 KG/M2 | SYSTOLIC BLOOD PRESSURE: 100 MMHG | HEIGHT: 72 IN | HEART RATE: 70 BPM

## 2022-08-24 DIAGNOSIS — E78.5 HYPERLIPIDEMIA, UNSPECIFIED HYPERLIPIDEMIA TYPE: ICD-10-CM

## 2022-08-24 DIAGNOSIS — R20.2 PARESTHESIAS: ICD-10-CM

## 2022-08-24 DIAGNOSIS — R03.0 ELEVATED BP WITHOUT DIAGNOSIS OF HYPERTENSION: Primary | ICD-10-CM

## 2022-08-24 LAB
ALBUMIN SERPL BCP-MCNC: 3.9 G/DL (ref 3.5–5)
ALBUMIN/GLOB SERPL: 1.3 {RATIO}
ALP SERPL-CCNC: 78 U/L (ref 45–115)
ALT SERPL W P-5'-P-CCNC: 24 U/L (ref 16–61)
ANION GAP SERPL CALCULATED.3IONS-SCNC: 13 MMOL/L (ref 7–16)
AST SERPL W P-5'-P-CCNC: 12 U/L (ref 15–37)
BILIRUB SERPL-MCNC: 0.5 MG/DL (ref 0–1.2)
BUN SERPL-MCNC: 10 MG/DL (ref 7–18)
BUN/CREAT SERPL: 11 (ref 6–20)
CALCIUM SERPL-MCNC: 9.3 MG/DL (ref 8.5–10.1)
CHLORIDE SERPL-SCNC: 103 MMOL/L (ref 98–107)
CHOLEST SERPL-MCNC: 179 MG/DL (ref 0–200)
CHOLEST/HDLC SERPL: 4.6 {RATIO}
CO2 SERPL-SCNC: 27 MMOL/L (ref 21–32)
CREAT SERPL-MCNC: 0.87 MG/DL (ref 0.7–1.3)
EGFR (NO RACE VARIABLE) (RUSH/TITUS): 109 ML/MIN/1.73M²
GLOBULIN SER-MCNC: 2.9 G/DL (ref 2–4)
GLUCOSE SERPL-MCNC: 62 MG/DL (ref 74–106)
HDLC SERPL-MCNC: 39 MG/DL (ref 40–60)
LDLC SERPL CALC-MCNC: 81 MG/DL
LDLC/HDLC SERPL: 2.1 {RATIO}
NONHDLC SERPL-MCNC: 140 MG/DL
POTASSIUM SERPL-SCNC: 3.8 MMOL/L (ref 3.5–5.1)
PROT SERPL-MCNC: 6.8 G/DL (ref 6.4–8.2)
SODIUM SERPL-SCNC: 139 MMOL/L (ref 136–145)
TRIGL SERPL-MCNC: 295 MG/DL (ref 35–150)
VIT B12 SERPL-MCNC: 330 PG/ML (ref 193–986)
VLDLC SERPL-MCNC: 59 MG/DL

## 2022-08-24 PROCEDURE — 82607 VITAMIN B-12: CPT | Mod: ,,, | Performed by: CLINICAL MEDICAL LABORATORY

## 2022-08-24 PROCEDURE — 3074F PR MOST RECENT SYSTOLIC BLOOD PRESSURE < 130 MM HG: ICD-10-PCS | Mod: ,,, | Performed by: FAMILY MEDICINE

## 2022-08-24 PROCEDURE — 99214 PR OFFICE/OUTPT VISIT, EST, LEVL IV, 30-39 MIN: ICD-10-PCS | Mod: ,,, | Performed by: FAMILY MEDICINE

## 2022-08-24 PROCEDURE — 80061 LIPID PANEL: ICD-10-PCS | Mod: ,,, | Performed by: CLINICAL MEDICAL LABORATORY

## 2022-08-24 PROCEDURE — 3078F PR MOST RECENT DIASTOLIC BLOOD PRESSURE < 80 MM HG: ICD-10-PCS | Mod: ,,, | Performed by: FAMILY MEDICINE

## 2022-08-24 PROCEDURE — 3008F PR BODY MASS INDEX (BMI) DOCUMENTED: ICD-10-PCS | Mod: ,,, | Performed by: FAMILY MEDICINE

## 2022-08-24 PROCEDURE — 3078F DIAST BP <80 MM HG: CPT | Mod: ,,, | Performed by: FAMILY MEDICINE

## 2022-08-24 PROCEDURE — 80053 COMPREHEN METABOLIC PANEL: CPT | Mod: ,,, | Performed by: CLINICAL MEDICAL LABORATORY

## 2022-08-24 PROCEDURE — 3074F SYST BP LT 130 MM HG: CPT | Mod: ,,, | Performed by: FAMILY MEDICINE

## 2022-08-24 PROCEDURE — 80053 COMPREHENSIVE METABOLIC PANEL: ICD-10-PCS | Mod: ,,, | Performed by: CLINICAL MEDICAL LABORATORY

## 2022-08-24 PROCEDURE — 3008F BODY MASS INDEX DOCD: CPT | Mod: ,,, | Performed by: FAMILY MEDICINE

## 2022-08-24 PROCEDURE — 99214 OFFICE O/P EST MOD 30 MIN: CPT | Mod: ,,, | Performed by: FAMILY MEDICINE

## 2022-08-24 PROCEDURE — 82607 VITAMIN B12: ICD-10-PCS | Mod: ,,, | Performed by: CLINICAL MEDICAL LABORATORY

## 2022-08-24 PROCEDURE — 80061 LIPID PANEL: CPT | Mod: ,,, | Performed by: CLINICAL MEDICAL LABORATORY

## 2022-08-24 NOTE — PROGRESS NOTES
New Osman MD        PATIENT NAME: Alda Gamboa  : 1980  DATE: 22  MRN: [id number]      Billing Provider: New Osman MD  Level of Service: [place] OFFICE/OUTPT VISIT, EST, LEVL IV, 30-39 MIN  Patient PCP Information       Provider PCP Type    New Osman MD General            Reason for Visit / Chief Complaint: Color Me Healthy (Color Me Healthy is the Primary Dx for this visit/Labs ordered for [facility])       Update PCP  Update Chief Complaint         History of Present Illness / Problem Focused Workflow     Alda Gamboa presents to the clinic with Color Me Healthy (Color Me Healthy is the Primary Dx for this visit/Labs ordered for [facility])     [facility]: htn, elevated bp.  Depression better but now with anxiety worse for few days.  Fatigue after few hours.        Review of Systems     Review of Systems   Constitutional:  Positive for fatigue. Negative for activity change, appetite change, fever and unexpected weight change.   HENT:  Negative for congestion, rhinorrhea, sinus pressure, sinus pain, sore throat and trouble swallowing.    Eyes:  Negative for photophobia, pain, discharge and visual disturbance.   Respiratory:  Negative for cough, chest tightness, wheezing and stridor.    Cardiovascular:  Negative for chest pain, palpitations and leg swelling.   Gastrointestinal:  Negative for abdominal pain, blood in stool, constipation, diarrhea and nausea.   Endocrine: Negative for polydipsia, polyphagia and polyuria.   Genitourinary:  Negative for difficulty urinating, flank pain and hematuria.   Musculoskeletal:  Positive for neck pain. Negative for arthralgias.   Skin:  Negative for rash.   Allergic/Immunologic: Negative for food allergies.   Neurological:  Negative for dizziness, tremors, seizures, syncope, weakness (global weakness) and headaches.   Psychiatric/Behavioral:  Negative for behavioral problems, confusion, decreased concentration, dysphoric mood and hallucinations. The patient is  not nervous/anxious.       Medical / Social / Family History     Past Medical History:   Diagnosis Date    Acute bacterial prostatitis 5/31/2022    Positive Cx for UTI with scant hematuria Treated with 10 day course of Bactrim DS, completed yesterday    Ankylosing spondylitis 5/31/2022    Patient takes immunosuppression - recently changed to Cimzia, Taltz Followed by Dr. Medel for this    Back pain     Elevated prostate specific antigen (PSA) 8/12/2022    Lab Results Component Value Date  PSA 7.600 (H) 08/10/2022  PSA 8.580 (H) 07/15/2022 s/p month of antx for tx of acute exacerbation of chronic prostatitis    Neck pain     Spondylosis        Past Surgical History:   Procedure Laterality Date    CYST REMOVAL Right     shoulder    HERNIA REPAIR      LUMBAR DISCECTOMY  02/2018    L 4 L5    TONSILLECTOMY         Social History    reports that he has quit smoking. He has never used smokeless tobacco. He reports that he does not currently use alcohol. He reports that he does not currently use drugs.    Family History  's family history includes Cancer in his maternal grandfather; Lung cancer in his father; Pacemaker/defibrilator in his mother.    Medications and Allergies     Medications  No outpatient medications have been marked as taking for the 8/24/22 encounter (Office Visit) with New Osman MD.       Allergies  Review of patient's allergies indicates:   Allergen Reactions    Pcn [penicillins] Hives and Swelling     Throat swelling    Neurontin [gabapentin]        Physical Examination     Vitals:    08/24/22 1559   BP: 100/62   Pulse: 70   Resp: 18     Physical Exam  Constitutional:       General: He is not in acute distress.     Appearance: Normal appearance.   HENT:      Head: Normocephalic.      Right Ear: Tympanic membrane and ear canal normal.      Left Ear: Tympanic membrane and ear canal normal.      Nose: Nose normal.      Mouth/Throat:      Mouth: Mucous membranes are moist.      Pharynx: No  oropharyngeal exudate.   Eyes:      Extraocular Movements: Extraocular movements intact.      Pupils: Pupils are equal, round, and reactive to light.   Cardiovascular:      Rate and Rhythm: Normal rate and regular rhythm.      Heart sounds: No murmur heard.  Pulmonary:      Effort: Pulmonary effort is normal.      Breath sounds: Normal breath sounds. No wheezing.   Abdominal:      General: Abdomen is flat. Bowel sounds are normal.      Palpations: Abdomen is soft.      Hernia: No hernia is present.   Musculoskeletal:         General: Normal range of motion.      Cervical back: Normal range of motion and neck supple.      Right lower leg: No edema.      Left lower leg: No edema.   Lymphadenopathy:      Cervical: No cervical adenopathy.   Skin:     General: Skin is warm and dry.      Coloration: Skin is not jaundiced.      Findings: No lesion.   Neurological:      General: No focal deficit present.      Mental Status: He is alert and oriented to person, place, and time.      Cranial Nerves: No cranial nerve deficit.      Gait: Gait normal.   Psychiatric:         Mood and Affect: Mood normal.         Behavior: Behavior normal.         Judgment: Judgment normal.        Assessment and Plan (including Health Maintenance)      Problem List  Smart Sets  Document Outside HM   :    Plan:   Elevated BP without diagnosis of hypertension  -     Comprehensive Metabolic Panel; Future; Expected date: 08/24/2022    Hyperlipidemia, unspecified hyperlipidemia type  -     Lipid Panel; Future; Expected date: 08/24/2022    Paresthesias  -     Vitamin B12; Future; Expected date: 08/24/2022         Health Maintenance Due   Topic Date Due    HIV Screening  Never done       Problem List Items Addressed This Visit    None  Visit Diagnoses       Elevated BP without diagnosis of hypertension    -  Primary    Relevant Orders    Comprehensive Metabolic Panel (Completed)    Hyperlipidemia, unspecified hyperlipidemia type        Relevant Orders     Lipid Panel (Completed)    Paresthesias        Relevant Orders    Vitamin B12 (Completed)            Health Maintenance Topics with due status: Not Due       Topic Last Completion Date    TETANUS VACCINE 08/04/2020    Lipid Panel 08/24/2022    Influenza Vaccine Not Due       Future Appointments   Date Time Provider Department Center   9/6/2022  8:30 [facility]    9/15/2022  8:30 AM B[facility] Dong [facility] UROL [facility]   11/25/2022  8:20 AM New Osman MD [facility]   7/5/2023  8:15 AM New Osman MD [facility]        There are no Patient Instructions on file for this visit.  Follow up in about 3 months (around 11/24/2022) for routine followup.     Signature:  New Osman MD      Date of encounter: 8/24/22

## 2022-09-06 ENCOUNTER — TELEPHONE (OUTPATIENT)
Dept: UROLOGY | Facility: CLINIC | Age: 42
End: 2022-09-06
Payer: COMMERCIAL

## 2022-09-06 ENCOUNTER — HOSPITAL ENCOUNTER (OUTPATIENT)
Dept: RADIOLOGY | Facility: HOSPITAL | Age: 42
Discharge: HOME | End: 2022-09-06
Attending: NURSE PRACTITIONER
Payer: COMMERCIAL

## 2022-09-06 DIAGNOSIS — R97.20 ELEVATED PROSTATE SPECIFIC ANTIGEN (PSA): ICD-10-CM

## 2022-09-06 DIAGNOSIS — Z98.890 H/O METAL REMOVED FROM EYE: ICD-10-CM

## 2022-09-06 PROCEDURE — 70030 X-RAY EYE FOR FOREIGN BODY: CPT | Mod: 26,LT,, | Performed by: RADIOLOGY

## 2022-09-06 PROCEDURE — 70030 X-RAY EYE FOR FOREIGN BODY: CPT | Mod: 26,RT,, | Performed by: RADIOLOGY

## 2022-09-06 PROCEDURE — 70030 X-RAY EYE FOR FOREIGN BODY: CPT | Mod: TC,50

## 2022-09-06 PROCEDURE — 70030 XR ORBIT EYE FOREIGN BODY BILAT: ICD-10-PCS | Mod: 26,LT,, | Performed by: RADIOLOGY

## 2022-09-06 NOTE — TELEPHONE ENCOUNTER
----- Message from BURTON Dong sent at 9/6/2022 12:57 PM CDT -----  No foreign body object seen  I called pt and got no answer.  Left message that I will call later.

## 2022-09-07 ENCOUNTER — TELEPHONE (OUTPATIENT)
Dept: UROLOGY | Facility: CLINIC | Age: 42
End: 2022-09-07
Payer: COMMERCIAL

## 2022-09-07 ENCOUNTER — OFFICE VISIT (OUTPATIENT)
Dept: FAMILY MEDICINE | Facility: CLINIC | Age: 42
End: 2022-09-07
Payer: COMMERCIAL

## 2022-09-07 VITALS
RESPIRATION RATE: 18 BRPM | BODY MASS INDEX: 24.11 KG/M2 | HEART RATE: 85 BPM | WEIGHT: 178 LBS | SYSTOLIC BLOOD PRESSURE: 120 MMHG | HEIGHT: 72 IN | OXYGEN SATURATION: 95 % | DIASTOLIC BLOOD PRESSURE: 68 MMHG

## 2022-09-07 DIAGNOSIS — N41.0 ACUTE BACTERIAL PROSTATITIS: ICD-10-CM

## 2022-09-07 DIAGNOSIS — M45.9 ANKYLOSING SPONDYLITIS, UNSPECIFIED SITE OF SPINE: Primary | ICD-10-CM

## 2022-09-07 DIAGNOSIS — R97.20 ELEVATED PROSTATE SPECIFIC ANTIGEN (PSA): Primary | ICD-10-CM

## 2022-09-07 PROCEDURE — 3074F PR MOST RECENT SYSTOLIC BLOOD PRESSURE < 130 MM HG: ICD-10-PCS | Mod: ,,, | Performed by: FAMILY MEDICINE

## 2022-09-07 PROCEDURE — 1160F RVW MEDS BY RX/DR IN RCRD: CPT | Mod: ,,, | Performed by: FAMILY MEDICINE

## 2022-09-07 PROCEDURE — 3078F DIAST BP <80 MM HG: CPT | Mod: ,,, | Performed by: FAMILY MEDICINE

## 2022-09-07 PROCEDURE — 99213 OFFICE O/P EST LOW 20 MIN: CPT | Mod: ,,, | Performed by: FAMILY MEDICINE

## 2022-09-07 PROCEDURE — 1159F MED LIST DOCD IN RCRD: CPT | Mod: ,,, | Performed by: FAMILY MEDICINE

## 2022-09-07 PROCEDURE — 3008F BODY MASS INDEX DOCD: CPT | Mod: ,,, | Performed by: FAMILY MEDICINE

## 2022-09-07 PROCEDURE — 99213 PR OFFICE/OUTPT VISIT, EST, LEVL III, 20-29 MIN: ICD-10-PCS | Mod: ,,, | Performed by: FAMILY MEDICINE

## 2022-09-07 PROCEDURE — 3078F PR MOST RECENT DIASTOLIC BLOOD PRESSURE < 80 MM HG: ICD-10-PCS | Mod: ,,, | Performed by: FAMILY MEDICINE

## 2022-09-07 PROCEDURE — 1160F PR REVIEW ALL MEDS BY PRESCRIBER/CLIN PHARMACIST DOCUMENTED: ICD-10-PCS | Mod: ,,, | Performed by: FAMILY MEDICINE

## 2022-09-07 PROCEDURE — 1159F PR MEDICATION LIST DOCUMENTED IN MEDICAL RECORD: ICD-10-PCS | Mod: ,,, | Performed by: FAMILY MEDICINE

## 2022-09-07 PROCEDURE — 3074F SYST BP LT 130 MM HG: CPT | Mod: ,,, | Performed by: FAMILY MEDICINE

## 2022-09-07 PROCEDURE — 3008F PR BODY MASS INDEX (BMI) DOCUMENTED: ICD-10-PCS | Mod: ,,, | Performed by: FAMILY MEDICINE

## 2022-09-07 NOTE — PROGRESS NOTES
New Osman MD        PATIENT NAME: Alda Gamboa  : 1980  DATE: 22  MRN: [id number]      Billing Provider: New Osman MD  Level of Service: [place] OFFICE/OUTPT VISIT, EST, LEVL III, 20-29 MIN  Patient PCP Information       Provider PCP Type    New Osman MD General            Reason for Visit / Chief Complaint: Follow-up (Wants to talk about zoloft and continuation of disability./Patient also needs to discuss with you what date he can return to work and document that date for him to return to work on the paper work he brought today)       Update PCP  Update Chief Complaint         History of Present Illness / Problem Focused Workflow     Alda Gamboa presents to the clinic with Follow-up (Wants to talk about zoloft and continuation of disability./Patient also needs to discuss with you what date he can return to work and document that date for him to return to work on the paper work he brought today)     Some better.  In bed 25% of time.  Fatigue.  Every 2-3 weeks will stay in bed 2-3 days.  Not seeing children due to ex-wife.        Review of Systems     Review of Systems   Constitutional:  Negative for activity change, appetite change, fever and unexpected weight change.   HENT:  Negative for congestion, rhinorrhea, sinus pressure, sinus pain, sore throat and trouble swallowing.    Eyes:  Negative for photophobia, pain, discharge and visual disturbance.   Respiratory:  Negative for cough, chest tightness, wheezing and stridor.    Cardiovascular:  Negative for chest pain, palpitations and leg swelling.   Gastrointestinal:  Negative for abdominal pain, blood in stool, constipation, diarrhea and nausea.   Endocrine: Negative for polydipsia, polyphagia and polyuria.   Genitourinary:  Negative for difficulty urinating, flank pain and hematuria.   Musculoskeletal:  Negative for arthralgias and neck pain.   Skin:  Negative for rash.   Allergic/Immunologic: Negative for food allergies.    Neurological:  Positive for weakness (global weakness) and numbness. Negative for dizziness, tremors, seizures, syncope and headaches.   Psychiatric/Behavioral:  Negative for behavioral problems, confusion, decreased concentration, dysphoric mood and hallucinations. The patient is not nervous/anxious.       Medical / Social / Family History     Past Medical History:   Diagnosis Date    Acute bacterial prostatitis 5/31/2022    Positive Cx for UTI with scant hematuria Treated with 10 day course of Bactrim DS, completed yesterday    Ankylosing spondylitis 5/31/2022    Patient takes immunosuppression - recently changed to Cimzia, Taltz Followed by Dr. Medel for this    Back pain     Elevated prostate specific antigen (PSA) 8/12/2022    Lab Results Component Value Date  PSA 7.600 (H) 08/10/2022  PSA 8.580 (H) 07/15/2022 s/p month of antx for tx of acute exacerbation of chronic prostatitis    Neck pain     Spondylosis        Past Surgical History:   Procedure Laterality Date    CYST REMOVAL Right     shoulder    HERNIA REPAIR      LUMBAR DISCECTOMY  02/2018    L 4 L5    TONSILLECTOMY         Social History    reports that he has quit smoking. He has never used smokeless tobacco. He reports that he does not currently use alcohol. He reports that he does not currently use drugs.    Family History  's family history includes Cancer in his maternal grandfather; Lung cancer in his father; Pacemaker/defibrilator in his mother.    Medications and Allergies     Medications  No outpatient medications have been marked as taking for the 9/7/22 encounter (Office Visit) with New Osman MD.       Allergies  Review of patient's allergies indicates:   Allergen Reactions    Pcn [penicillins] Hives and Swelling     Throat swelling    Neurontin [gabapentin]        Physical Examination     Vitals:    09/07/22 1327   BP: 120/68   Pulse: 85   Resp: 18     Physical Exam  Constitutional:       General: He is not in acute distress.      Appearance: Normal appearance.   HENT:      Head: Normocephalic.      Right Ear: Tympanic membrane and ear canal normal.      Left Ear: Tympanic membrane and ear canal normal.      Nose: Nose normal.      Mouth/Throat:      Mouth: Mucous membranes are moist.      Pharynx: No oropharyngeal exudate.   Eyes:      Extraocular Movements: Extraocular movements intact.      Pupils: Pupils are equal, round, and reactive to light.   Neck:      Comments: Decreased range of motion in the neck 25% of rotation either side  Cardiovascular:      Rate and Rhythm: Normal rate and regular rhythm.      Heart sounds: No murmur heard.  Pulmonary:      Effort: Pulmonary effort is normal.      Breath sounds: Normal breath sounds. No wheezing.   Abdominal:      General: Abdomen is flat. Bowel sounds are normal.      Palpations: Abdomen is soft.      Hernia: No hernia is present.   Musculoskeletal:         General: Normal range of motion.      Cervical back: Neck supple.      Right lower leg: No edema.      Left lower leg: No edema.   Lymphadenopathy:      Cervical: No cervical adenopathy.   Skin:     General: Skin is warm and dry.      Coloration: Skin is not jaundiced.      Findings: No lesion.   Neurological:      General: No focal deficit present.      Mental Status: He is alert and oriented to person, place, and time.      Cranial Nerves: No cranial nerve deficit.      Gait: Gait normal.   Psychiatric:         Mood and Affect: Mood normal.         Behavior: Behavior normal.         Judgment: Judgment normal.        Assessment and Plan (including Health Maintenance)      Problem List  Smart Sets  Document Outside HM   :    Plan:   Ankylosing spondylitis, unspecified site of spine         Health Maintenance Due   Topic Date Due    HIV Screening  Never done    Influenza Vaccine (1) Never done       Problem List Items Addressed This Visit          Orthopedic    Ankylosing spondylitis - Primary    Overview     Patient takes  immunosuppression - recently changed to Cimzia, Taltz  Followed by Dr. Medel for this            Health Maintenance Topics with due status: Not Due       Topic Last Completion Date    TETANUS VACCINE 08/04/2020    Lipid Panel 08/24/2022       Future Appointments   Date Time Provider Department Center   9/15/2022  8:30 AM [facility] Dong [facility] UROL [facility]   11/25/2022  8:20 AM New Osman MD [facility]   7/5/2023  8:15 AM New Osman MD [facility]        There are no Patient Instructions on file for this visit.  Follow up in about 3 months (around 12/7/2022) for routine followup.     Signature:  New Osman MD      Date of encounter: 9/7/22

## 2022-09-07 NOTE — TELEPHONE ENCOUNTER
----- Message from BURTON Dong sent at 9/6/2022 12:57 PM CDT -----  No foreign body object seen  I called and spoke with pt and relayed the above information.  He voiced understanding.

## 2022-09-08 DIAGNOSIS — R97.20 ELEVATED PROSTATE SPECIFIC ANTIGEN (PSA): Primary | ICD-10-CM

## 2022-09-15 ENCOUNTER — OFFICE VISIT (OUTPATIENT)
Dept: UROLOGY | Facility: CLINIC | Age: 42
End: 2022-09-15
Payer: COMMERCIAL

## 2022-09-15 VITALS
TEMPERATURE: 98 F | SYSTOLIC BLOOD PRESSURE: 128 MMHG | BODY MASS INDEX: 24.11 KG/M2 | WEIGHT: 178 LBS | OXYGEN SATURATION: 99 % | HEART RATE: 82 BPM | HEIGHT: 72 IN | DIASTOLIC BLOOD PRESSURE: 80 MMHG

## 2022-09-15 DIAGNOSIS — E29.1 TESTOSTERONE DEFICIENCY IN MALE: Primary | ICD-10-CM

## 2022-09-15 DIAGNOSIS — M45.9 ANKYLOSING SPONDYLITIS, UNSPECIFIED SITE OF SPINE: ICD-10-CM

## 2022-09-15 DIAGNOSIS — R97.20 ELEVATED PROSTATE SPECIFIC ANTIGEN (PSA): ICD-10-CM

## 2022-09-15 DIAGNOSIS — N41.0 ACUTE BACTERIAL PROSTATITIS: ICD-10-CM

## 2022-09-15 PROCEDURE — 3074F SYST BP LT 130 MM HG: CPT | Mod: ,,, | Performed by: NURSE PRACTITIONER

## 2022-09-15 PROCEDURE — 99215 OFFICE O/P EST HI 40 MIN: CPT | Mod: PBBFAC | Performed by: NURSE PRACTITIONER

## 2022-09-15 PROCEDURE — 3079F DIAST BP 80-89 MM HG: CPT | Mod: ,,, | Performed by: NURSE PRACTITIONER

## 2022-09-15 PROCEDURE — 99214 PR OFFICE/OUTPT VISIT, EST, LEVL IV, 30-39 MIN: ICD-10-PCS | Mod: S$PBB,,, | Performed by: NURSE PRACTITIONER

## 2022-09-15 PROCEDURE — 1159F MED LIST DOCD IN RCRD: CPT | Mod: ,,, | Performed by: NURSE PRACTITIONER

## 2022-09-15 PROCEDURE — 1160F PR REVIEW ALL MEDS BY PRESCRIBER/CLIN PHARMACIST DOCUMENTED: ICD-10-PCS | Mod: ,,, | Performed by: NURSE PRACTITIONER

## 2022-09-15 PROCEDURE — 3079F PR MOST RECENT DIASTOLIC BLOOD PRESSURE 80-89 MM HG: ICD-10-PCS | Mod: ,,, | Performed by: NURSE PRACTITIONER

## 2022-09-15 PROCEDURE — 99214 OFFICE O/P EST MOD 30 MIN: CPT | Mod: S$PBB,,, | Performed by: NURSE PRACTITIONER

## 2022-09-15 PROCEDURE — 1159F PR MEDICATION LIST DOCUMENTED IN MEDICAL RECORD: ICD-10-PCS | Mod: ,,, | Performed by: NURSE PRACTITIONER

## 2022-09-15 PROCEDURE — 1160F RVW MEDS BY RX/DR IN RCRD: CPT | Mod: ,,, | Performed by: NURSE PRACTITIONER

## 2022-09-15 PROCEDURE — 3008F PR BODY MASS INDEX (BMI) DOCUMENTED: ICD-10-PCS | Mod: ,,, | Performed by: NURSE PRACTITIONER

## 2022-09-15 PROCEDURE — 3074F PR MOST RECENT SYSTOLIC BLOOD PRESSURE < 130 MM HG: ICD-10-PCS | Mod: ,,, | Performed by: NURSE PRACTITIONER

## 2022-09-15 PROCEDURE — 3008F BODY MASS INDEX DOCD: CPT | Mod: ,,, | Performed by: NURSE PRACTITIONER

## 2022-09-15 NOTE — PROGRESS NOTES
Subjective:       Patient ID: Alda Gamboa is a 42 y.o. male.    Chief Complaint: Follow-up (2 month f/u with PSA prior)    Mr. Gamboa is a pleasant 43 yo male who is here today for initial evaluation for acute prostatitis with his wife who is a [employer] employee (RN).  PMH includes recent treatment with immunosuppressant initiated for ankylosing spondylitis.  Patient states c/o frequency, nocturia with fever of 106.9 per spouses report on 5/17, 103.5 on 5/18.   WBC elevated to 12 at this time. He was treated with daily Rocephin, and a 10 day course of Bactrim DS po which was completed yesterday.  Urine culture grew >100,000 Klebsiella pneumoniae.  He states no fever since the 18th, he denies testicular or prostate pain, dysuria today, and reports he really improved overall after the Rocephin.  He had a semi boggy, mildly tender enlarged prostate on PE today.  [5/31/2022]---------------------------------------------------------------------    Mr. Gamboa is here today for 6 week sx recheck and f/u for acute bacterial Prostatitis.  Positive urine Cx grew >100,000 Klebsiella pneumoniae 5/18/2022 without hematuria, and was treated with on month of Doxycycline.  No PSA on records or received from Dr. Matt Grover who treats his for TRT, but he states he had been out of TRT for 5 months at last visit.    Acute bacterial prostatitis  reculture urine  UA, to confirm  Microhematuria has resolved with treatment.    Ankylosing spondylitis  Patient to alert Dr. Medel of UTI, Acute Prostatitis.   [7/13/2022]----------------------------------------------------------------------------------------------------      Mr. Gamboa returns today for f/u of his testosterone deficiency andand elevated PSA s/p 4 years of TRT .  TRT has been held b/c of elevated PSA values for over 6 mos now.  He returns today with repeat PSA of 7.600 ( which was drawn one month earlier than planned by PCP).  Course of antx for acute bacterial  "prostatitis, which presumably was effecting elevated levels.  Low testosterone levels are also likely to be effected by his insomnia r/t uncontrolled pain from Ankylosing Spondylitis.  Negative sleep apnea negative per his history last visit.  C/o prostadynia which was states was a "pressure only".  Also reports severe suprapubic pain one night which resolved on it's own in Feb or March.    PSA RESULTS:                                  PSA                      7.600 (H)           08/10/2022                 PSA                      8.580 (H)           07/15/2022            Testosterone (ng/dL)       Date                     Value                 07/15/2022               261.0            [8/12/2022]----------------------------------------------------------------------------------------------    Mr. Gamboa is a very pleasant 41 yo gentleman who is here today with his wife for 2 month f/u visit with PSA.  Patient states he is voiding well.  C/o severe fatigue and "no labido" off TRT, and states that he would rather take the risk if necessary to have a better quality of life restarting TRT.  PMH includes history of acute bacterial prostatitis, and elevation on TRT provided prior to referral.     -  Elevated prostate specific antigen (PSA)   Patient elected to proceed with MRI Prostate which resulted a prostate size of 17.7 and PI-RADS of 1, but exam quality effected by motion.  They recomment repeating f/u MRI in one year.              Patient elects to f/u 3 months with repeat PSA, and if elevated repeat MRI even if he has to pay out of pocket to rule out cancer and possibly be started back on TRT ("severe fatigue and no labido").  Risks of TRT known to patient.    -  Testosterone deficiency in male   TRT contraindicated  [9/15/2022]-----------------------------------------------------------------------------------------------      Review of Systems   Constitutional:  Negative for activity change, appetite change, " chills, diaphoresis, fatigue, fever and unexpected weight change.   Cardiovascular: Negative.    Gastrointestinal: Negative.    Genitourinary:  Negative for decreased urine volume, difficulty urinating, dysuria, enuresis, flank pain, frequency, genital sores, hematuria, penile discharge, penile pain, penile swelling, scrotal swelling, testicular pain and urgency.        Feeling of not emptying, weak stream, decreased urination with frequency resolved following 6 weeks of antx for underlying prostatitis.   Musculoskeletal:  Positive for back pain.        Chronic   Skin: Negative.    Neurological: Negative.      Objective:      Physical Exam  Vitals and nursing note reviewed. Exam conducted with a chaperone present (spouse present for exam).   Constitutional:       General: He is not in acute distress.     Appearance: Normal appearance. He is normal weight. He is not ill-appearing, toxic-appearing or diaphoretic.   HENT:      Mouth/Throat:      Pharynx: Oropharynx is clear.   Eyes:      General: No scleral icterus.        Right eye: No discharge.         Left eye: No discharge.   Cardiovascular:      Rate and Rhythm: Normal rate.   Pulmonary:      Effort: Pulmonary effort is normal.   Abdominal:      General: There is no distension.      Palpations: Abdomen is soft.      Tenderness: There is no abdominal tenderness. There is no right CVA tenderness or left CVA tenderness.   Musculoskeletal:      Right lower leg: No edema.      Left lower leg: No edema.   Skin:     General: Skin is warm and dry.      Coloration: Skin is not jaundiced or pale.      Findings: No rash.   Neurological:      Mental Status: He is alert and oriented to person, place, and time. Mental status is at baseline.   Psychiatric:         Mood and Affect: Mood normal.         Behavior: Behavior normal.         Thought Content: Thought content normal.         Judgment: Judgment normal.       Assessment:       1. Testosterone deficiency in male    2.  "Elevated prostate specific antigen (PSA)    3. Acute bacterial prostatitis    4. Ankylosing spondylitis, unspecified site of spine          Plan:       Elevated prostate specific antigen (PSA)  Lab Results   Component Value Date    PSA 4.080 (H) 09/13/2022    PSA 7.600 (H) 08/10/2022    PSA 8.580 (H) 07/15/2022   improved following treatment for acute prostatitis  Patient elects to f/u 3 months with repeat PSA, and if elevated repeat MRI even if he has to pay out of pocket to rule out cancer and possibly be started back on TRT ("severe fatigue and no labido").  Risks of TRT known to patient.    Testosterone deficiency in male  As discussed with patient, I reviewed prostate elevation with MRI Prostate results with Dr. Anaya and patients request to restart TRT r/t c/o severe exhaustion and "no labido".  Dr. Anaya recommends restarting TRT.  Need prescription information on last TRT prescribed to him by his PCP to restart therapy.  His insurance will may require an updated total testosterone level, but we can wait to see.  Testosterone (ng/dL)   Date Value   07/15/2022 261.0        "

## 2022-09-15 NOTE — ASSESSMENT & PLAN NOTE
"Lab Results   Component Value Date    PSA 4.080 (H) 09/13/2022    PSA 7.600 (H) 08/10/2022    PSA 8.580 (H) 07/15/2022   · improved following treatment for acute prostatitis  · Patient elects to f/u 3 months with repeat PSA, and if elevated repeat MRI even if he has to pay out of pocket to rule out cancer and possibly be started back on TRT ("severe fatigue and no labido").  Risks of TRT known to patient.  "

## 2022-09-21 DIAGNOSIS — E29.1 TESTOSTERONE DEFICIENCY IN MALE: Primary | ICD-10-CM

## 2022-09-21 NOTE — ASSESSMENT & PLAN NOTE
"· As discussed with patient, I reviewed prostate elevation with MRI Prostate results with Dr. Anaya and patients request to restart TRT r/t c/o severe exhaustion and "no labido".  Dr. Anaya recommends restarting TRT.  · Need prescription information on last TRT prescribed to him by his PCP to restart therapy.  His insurance will may require an updated total testosterone level, but we can wait to see.  Testosterone (ng/dL)   Date Value   07/15/2022 261.0   ·   "

## 2022-09-21 NOTE — TELEPHONE ENCOUNTER
Spoke with patient and Dr Matt Grover was ordering Testosterone Cypionate prior and Dr Osman ordered it one time. 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 He said he is okay using cream now. Pt is currently using [employer] [id number] and ID# [id number] for his Drug Plan.

## 2022-09-23 RX ORDER — TESTOSTERONE 20.25 MG/1.25G
40.5 GEL TOPICAL DAILY
Qty: 75 G | Refills: 0 | Status: SHIPPED | OUTPATIENT
Start: 2022-09-23 | End: 2022-10-23

## 2022-11-17 DIAGNOSIS — Z01.818 ENCOUNTER FOR OTHER PREPROCEDURAL EXAMINATION: ICD-10-CM

## 2022-11-17 DIAGNOSIS — R97.20 ELEVATED PROSTATE SPECIFIC ANTIGEN (PSA): Primary | ICD-10-CM

## 2022-11-18 ENCOUNTER — OFFICE VISIT (OUTPATIENT)
Dept: FAMILY MEDICINE | Facility: CLINIC | Age: 42
End: 2022-11-18
Payer: COMMERCIAL

## 2022-11-18 VITALS
OXYGEN SATURATION: 98 % | RESPIRATION RATE: 20 BRPM | WEIGHT: 176 LBS | BODY MASS INDEX: 23.84 KG/M2 | HEART RATE: 83 BPM | HEIGHT: 72 IN | DIASTOLIC BLOOD PRESSURE: 84 MMHG | SYSTOLIC BLOOD PRESSURE: 136 MMHG

## 2022-11-18 DIAGNOSIS — F41.9 ANXIETY: ICD-10-CM

## 2022-11-18 DIAGNOSIS — N52.9 ERECTILE DYSFUNCTION, UNSPECIFIED ERECTILE DYSFUNCTION TYPE: ICD-10-CM

## 2022-11-18 DIAGNOSIS — K21.9 GASTROESOPHAGEAL REFLUX DISEASE WITHOUT ESOPHAGITIS: ICD-10-CM

## 2022-11-18 DIAGNOSIS — Z79.899 ENCOUNTER FOR LONG-TERM (CURRENT) USE OF OTHER MEDICATIONS: Primary | ICD-10-CM

## 2022-11-18 DIAGNOSIS — F32.A DEPRESSION, UNSPECIFIED DEPRESSION TYPE: ICD-10-CM

## 2022-11-18 DIAGNOSIS — M45.9 ANKYLOSING SPONDYLITIS, UNSPECIFIED SITE OF SPINE: ICD-10-CM

## 2022-11-18 DIAGNOSIS — E78.2 MIXED HYPERLIPIDEMIA: ICD-10-CM

## 2022-11-18 PROCEDURE — 99214 OFFICE O/P EST MOD 30 MIN: CPT | Mod: 25,,, | Performed by: FAMILY MEDICINE

## 2022-11-18 PROCEDURE — 3075F SYST BP GE 130 - 139MM HG: CPT | Mod: ,,, | Performed by: FAMILY MEDICINE

## 2022-11-18 PROCEDURE — 3079F DIAST BP 80-89 MM HG: CPT | Mod: ,,, | Performed by: FAMILY MEDICINE

## 2022-11-18 PROCEDURE — 3008F PR BODY MASS INDEX (BMI) DOCUMENTED: ICD-10-PCS | Mod: ,,, | Performed by: FAMILY MEDICINE

## 2022-11-18 PROCEDURE — 1159F PR MEDICATION LIST DOCUMENTED IN MEDICAL RECORD: ICD-10-PCS | Mod: ,,, | Performed by: FAMILY MEDICINE

## 2022-11-18 PROCEDURE — 3008F BODY MASS INDEX DOCD: CPT | Mod: ,,, | Performed by: FAMILY MEDICINE

## 2022-11-18 PROCEDURE — 99214 PR OFFICE/OUTPT VISIT, EST, LEVL IV, 30-39 MIN: ICD-10-PCS | Mod: 25,,, | Performed by: FAMILY MEDICINE

## 2022-11-18 PROCEDURE — 90686 PR FLU VACCINE, QIIV4, NO PRSV, 0.5 ML, IM: ICD-10-PCS | Mod: ,,, | Performed by: FAMILY MEDICINE

## 2022-11-18 PROCEDURE — 90471 IMMUNIZATION ADMIN: CPT | Mod: ,,, | Performed by: FAMILY MEDICINE

## 2022-11-18 PROCEDURE — 3079F PR MOST RECENT DIASTOLIC BLOOD PRESSURE 80-89 MM HG: ICD-10-PCS | Mod: ,,, | Performed by: FAMILY MEDICINE

## 2022-11-18 PROCEDURE — 90471 FLU VACCINE (QUAD) GREATER THAN OR EQUAL TO 3YO PRESERVATIVE FREE IM: ICD-10-PCS | Mod: ,,, | Performed by: FAMILY MEDICINE

## 2022-11-18 PROCEDURE — 80305 DRUG TEST PRSMV DIR OPT OBS: CPT | Mod: QW,,, | Performed by: FAMILY MEDICINE

## 2022-11-18 PROCEDURE — 80305 POCT URINE DRUG SCREEN PRESUMP: ICD-10-PCS | Mod: QW,,, | Performed by: FAMILY MEDICINE

## 2022-11-18 PROCEDURE — 90686 IIV4 VACC NO PRSV 0.5 ML IM: CPT | Mod: ,,, | Performed by: FAMILY MEDICINE

## 2022-11-18 PROCEDURE — 3075F PR MOST RECENT SYSTOLIC BLOOD PRESS GE 130-139MM HG: ICD-10-PCS | Mod: ,,, | Performed by: FAMILY MEDICINE

## 2022-11-18 PROCEDURE — 1159F MED LIST DOCD IN RCRD: CPT | Mod: ,,, | Performed by: FAMILY MEDICINE

## 2022-11-18 RX ORDER — CYCLOBENZAPRINE HCL 10 MG
10 TABLET ORAL NIGHTLY
Qty: 30 TABLET | Refills: 11 | Status: SHIPPED | OUTPATIENT
Start: 2022-11-18 | End: 2023-10-23 | Stop reason: SDUPTHER

## 2022-11-18 RX ORDER — DEXLANSOPRAZOLE 60 MG/1
1 CAPSULE, DELAYED RELEASE ORAL DAILY
Qty: 30 CAPSULE | Refills: 11 | Status: SHIPPED | OUTPATIENT
Start: 2022-11-18 | End: 2023-02-08

## 2022-11-18 RX ORDER — TADALAFIL 20 MG/1
20 TABLET ORAL DAILY
Qty: 10 TABLET | Refills: 11 | Status: SHIPPED | OUTPATIENT
Start: 2022-11-18 | End: 2023-10-23 | Stop reason: SDUPTHER

## 2022-11-18 RX ORDER — SERTRALINE HYDROCHLORIDE 100 MG/1
TABLET, FILM COATED ORAL
Qty: 90 TABLET | Refills: 3 | Status: SHIPPED | OUTPATIENT
Start: 2022-11-18 | End: 2023-10-23 | Stop reason: SDUPTHER

## 2022-11-18 RX ORDER — CLONAZEPAM 1 MG/1
1 TABLET ORAL 2 TIMES DAILY PRN
Qty: 30 TABLET | Refills: 2 | Status: SHIPPED | OUTPATIENT
Start: 2022-11-18

## 2022-11-18 NOTE — PROGRESS NOTES
Subjective     Patient ID: Alda Gamboa is a 42 y.o. male.    Chief Complaint: Color Me Healthy (Primary dx: cholesterol)    Wants to go back to work.  Still has pain in neck and back with standing.  Voltaren cream helpa.  Gets pain upper extemities.      Review of Systems   Constitutional:  Negative for activity change, appetite change, fatigue, fever and unexpected weight change.   HENT:  Negative for nasal congestion, dental problem, ear pain, hearing loss, mouth sores, nosebleeds, sore throat, tinnitus and voice change.    Eyes:  Negative for pain, discharge and visual disturbance.   Respiratory:  Negative for apnea, choking, chest tightness, shortness of breath and wheezing.    Cardiovascular:  Negative for chest pain, palpitations, leg swelling and claudication.   Gastrointestinal:  Negative for abdominal pain, blood in stool, change in bowel habit and change in bowel habit.   Endocrine: Negative for polydipsia, polyphagia and polyuria.   Genitourinary:  Negative for bladder incontinence, dysuria, erectile dysfunction, flank pain, genital sores and hematuria.   Musculoskeletal:  Positive for back pain and neck pain. Negative for arthralgias, gait problem and neck stiffness.   Integumentary:  Negative for rash, wound, mole/lesion, breast mass and breast discharge.   Allergic/Immunologic: Negative for food allergies.   Neurological:  Negative for seizures, syncope, headaches, coordination difficulties, memory loss and coordination difficulties.   Hematological:  Negative for adenopathy. Does not bruise/bleed easily.   Psychiatric/Behavioral:  Negative for agitation, behavioral problems, confusion, decreased concentration, hallucinations, self-injury, sleep disturbance and suicidal ideas. The patient is not nervous/anxious.    Breast: Negative for mass    Tobacco Use: Medium Risk    Smoking Tobacco Use: Former    Smokeless Tobacco Use: Never    Passive Exposure: Past     Review of patient's allergies  indicates:   Allergen Reactions    Pcn [penicillins] Hives and Swelling     Throat swelling    Neurontin [gabapentin]      Current Outpatient Medications   Medication Instructions    CIMZIA POWDER FOR RECONST 400 mg, Subcutaneous    clonazePAM (KLONOPIN) 1 mg, Oral, 2 times daily PRN    cyclobenzaprine (FLEXERIL) 10 mg, Oral, Nightly    dexlansoprazole (DEXILANT) 60 mg, Oral, Daily    diclofenac (VOLTAREN) 75 mg, Oral, 2 times daily PRN    ixekizumab (TALTZ AUTOINJECTOR SUBQ) 80 mg, Subcutaneous, Every 28 days, 80 mg per 1 ml autoinjection.    pregabalin (LYRICA) 75 MG capsule 1 capsule, Oral, Nightly    sertraline (ZOLOFT) 100 MG tablet 1/2 tab po q AM x 7 days, the 1 tab po daily    tadalafiL (CIALIS) 20 mg, Oral, Daily     There are no discontinued medications.    Past Medical History:   Diagnosis Date    Acute bacterial prostatitis 5/31/2022    Positive Cx for UTI with scant hematuria Treated with 10 day course of Bactrim DS, completed yesterday    Ankylosing spondylitis 5/31/2022    Patient takes immunosuppression - recently changed to Cimzia, Taltz Followed by Dr. Medel for this    Back pain     Elevated prostate specific antigen (PSA) 8/12/2022    Lab Results Component Value Date  PSA 7.600 (H) 08/10/2022  PSA 8.580 (H) 07/15/2022 s/p month of antx for tx of acute exacerbation of chronic prostatitis    Neck pain     Spondylosis      Health Maintenance Topics with due status: Not Due       Topic Last Completion Date    TETANUS VACCINE 08/04/2020    Lipid Panel 08/24/2022     Immunization History   Administered Date(s) Administered    COVID-19, MRNA, LN-S, PF (MODERNA FULL 0.5 ML DOSE) 03/23/2021, 04/21/2021, 10/12/2021    Tdap 08/04/2020       Objective     Body mass index is 23.87 kg/m².  Wt Readings from Last 3 Encounters:   11/18/22 79.8 kg (176 lb)   09/15/22 80.7 kg (178 lb)   09/07/22 80.7 kg (178 lb)     Ht Readings from Last 3 Encounters:   11/18/22 6' (1.829 m)   09/15/22 6' (1.829 m)   09/07/22 6'  (1.829 m)     BP Readings from Last 3 Encounters:   11/18/22 136/84   09/15/22 128/80   09/07/22 120/68     Temp Readings from Last 3 Encounters:   09/15/22 98 °F (36.7 °C)   08/12/22 98.2 °F (36.8 °C) (Oral)   07/13/22 98.4 °F (36.9 °C) (Oral)     Pulse Readings from Last 3 Encounters:   11/18/22 83   09/15/22 82   09/07/22 85     Resp Readings from Last 3 Encounters:   11/18/22 20   09/07/22 18   08/24/22 18     PF Readings from Last 3 Encounters:   No data found for PF       Physical Exam  Constitutional:       General: He is not in acute distress.     Appearance: Normal appearance.   HENT:      Head: Normocephalic.      Right Ear: Tympanic membrane and ear canal normal.      Left Ear: Tympanic membrane and ear canal normal.      Nose: Nose normal.      Mouth/Throat:      Mouth: Mucous membranes are moist.      Pharynx: No oropharyngeal exudate.   Eyes:      Extraocular Movements: Extraocular movements intact.      Pupils: Pupils are equal, round, and reactive to light.   Cardiovascular:      Rate and Rhythm: Normal rate and regular rhythm.      Heart sounds: No murmur heard.  Pulmonary:      Effort: Pulmonary effort is normal.      Breath sounds: Normal breath sounds. No wheezing.   Abdominal:      General: Abdomen is flat. Bowel sounds are normal.      Palpations: Abdomen is soft.      Hernia: No hernia is present.   Musculoskeletal:         General: Normal range of motion.      Cervical back: Normal range of motion and neck supple.      Right lower leg: No edema.      Left lower leg: No edema.   Lymphadenopathy:      Cervical: No cervical adenopathy.   Skin:     General: Skin is warm and dry.      Coloration: Skin is not jaundiced.      Findings: No lesion.   Neurological:      General: No focal deficit present.      Mental Status: He is alert and oriented to person, place, and time.      Cranial Nerves: No cranial nerve deficit.      Gait: Gait normal.   Psychiatric:         Mood and Affect: Mood normal.          Behavior: Behavior normal.         Judgment: Judgment normal.       Assessment and Plan     Problem List Items Addressed This Visit          Orthopedic    Ankylosing spondylitis     Other Visit Diagnoses       Encounter for long-term (current) use of other medications    -  Primary    Relevant Orders    POCT Urine Drug Screen Presump (Completed)    Depression, unspecified depression type        Anxiety        Erectile dysfunction, unspecified erectile dysfunction type        Gastroesophageal reflux disease without esophagitis                Plan:         I have reviewed the medications, allergies, and problem list.     Goal Actions:    What type of visit is the patient here for today?: Color Me Healthy  Which Color Me Healthy program is the patient enrolling in?: Cholesterol  Is this a Wellness Follow Up?: Yes  What is your overall wellness goal? (select at least one): Improve overall health  Choose 3: Lifestyle, Nutrition, Exercise  Lifestyle Actions : Take medications as prescribed  Nurtrition Actions: Eat a well-balanced diet  Exercise Actions: Recommend physical activity 30 minutes per day 3-5 times/week

## 2022-11-18 NOTE — LETTER
November 18, 2022      [facility]  [address] [place] [address]  Phone: [phone]  Fax: [phone]       Patient: Alda Gamboa   YOB: 1980  Date of Visit: 11/18/2022    To Whom It May Concern:    Jah Gamboa  was at [facility] on 11/18/2022. The patient may return to work/school on December 5, 2022 with no restrictions. If you have any questions or concerns, or if I can be of further assistance, please do not hesitate to contact me.    Sincerely,    [place] Osman MD

## 2022-12-28 ENCOUNTER — OFFICE VISIT (OUTPATIENT)
Dept: UROLOGY | Facility: CLINIC | Age: 42
End: 2022-12-28
Payer: COMMERCIAL

## 2022-12-28 VITALS
HEIGHT: 72 IN | BODY MASS INDEX: 23.84 KG/M2 | DIASTOLIC BLOOD PRESSURE: 82 MMHG | WEIGHT: 176 LBS | HEART RATE: 91 BPM | SYSTOLIC BLOOD PRESSURE: 124 MMHG | OXYGEN SATURATION: 96 % | TEMPERATURE: 98 F

## 2022-12-28 DIAGNOSIS — R97.20 ELEVATED PROSTATE SPECIFIC ANTIGEN (PSA): ICD-10-CM

## 2022-12-28 DIAGNOSIS — E29.1 TESTOSTERONE DEFICIENCY IN MALE: Primary | ICD-10-CM

## 2022-12-28 DIAGNOSIS — N41.0 ACUTE BACTERIAL PROSTATITIS: ICD-10-CM

## 2022-12-28 PROCEDURE — 3079F PR MOST RECENT DIASTOLIC BLOOD PRESSURE 80-89 MM HG: ICD-10-PCS | Mod: ,,, | Performed by: NURSE PRACTITIONER

## 2022-12-28 PROCEDURE — 1160F PR REVIEW ALL MEDS BY PRESCRIBER/CLIN PHARMACIST DOCUMENTED: ICD-10-PCS | Mod: ,,, | Performed by: NURSE PRACTITIONER

## 2022-12-28 PROCEDURE — 3008F BODY MASS INDEX DOCD: CPT | Mod: ,,, | Performed by: NURSE PRACTITIONER

## 2022-12-28 PROCEDURE — 99214 PR OFFICE/OUTPT VISIT, EST, LEVL IV, 30-39 MIN: ICD-10-PCS | Mod: S$PBB,,, | Performed by: NURSE PRACTITIONER

## 2022-12-28 PROCEDURE — 3074F PR MOST RECENT SYSTOLIC BLOOD PRESSURE < 130 MM HG: ICD-10-PCS | Mod: ,,, | Performed by: NURSE PRACTITIONER

## 2022-12-28 PROCEDURE — 1159F MED LIST DOCD IN RCRD: CPT | Mod: ,,, | Performed by: NURSE PRACTITIONER

## 2022-12-28 PROCEDURE — 1160F RVW MEDS BY RX/DR IN RCRD: CPT | Mod: ,,, | Performed by: NURSE PRACTITIONER

## 2022-12-28 PROCEDURE — 3074F SYST BP LT 130 MM HG: CPT | Mod: ,,, | Performed by: NURSE PRACTITIONER

## 2022-12-28 PROCEDURE — 3079F DIAST BP 80-89 MM HG: CPT | Mod: ,,, | Performed by: NURSE PRACTITIONER

## 2022-12-28 PROCEDURE — 99214 OFFICE O/P EST MOD 30 MIN: CPT | Mod: S$PBB,,, | Performed by: NURSE PRACTITIONER

## 2022-12-28 PROCEDURE — 3008F PR BODY MASS INDEX (BMI) DOCUMENTED: ICD-10-PCS | Mod: ,,, | Performed by: NURSE PRACTITIONER

## 2022-12-28 PROCEDURE — 1159F PR MEDICATION LIST DOCUMENTED IN MEDICAL RECORD: ICD-10-PCS | Mod: ,,, | Performed by: NURSE PRACTITIONER

## 2022-12-28 PROCEDURE — 99214 OFFICE O/P EST MOD 30 MIN: CPT | Mod: PBBFAC | Performed by: NURSE PRACTITIONER

## 2022-12-28 NOTE — ASSESSMENT & PLAN NOTE
· Early morning testosterone tomorrow  · Will restart full TRT again at 2 pumps and have him F/u one month for symptom recheck and repeated total testosterone level  · Consult placed with Dr. Pride per patient request

## 2022-12-28 NOTE — PROGRESS NOTES
Subjective:       Patient ID: Alda Gamboa is a 42 y.o. male.    Chief Complaint: 3 month follow up (- no complaints within 3 months - no trouble emptying )    Mr. Gamboa is a pleasant 43 yo male who is here today for initial evaluation for acute prostatitis with his wife who is a [employer] employee (RN).  PMH includes recent treatment with immunosuppressant initiated for ankylosing spondylitis.  Patient states c/o frequency, nocturia with fever of 106.9 per spouses report on 5/17, 103.5 on 5/18.   WBC elevated to 12 at this time. He was treated with daily Rocephin, and a 10 day course of Bactrim DS po which was completed yesterday.  Urine culture grew >100,000 Klebsiella pneumoniae.  He states no fever since the 18th, he denies testicular or prostate pain, dysuria today, and reports he really improved overall after the Rocephin.  He had a semi boggy, mildly tender enlarged prostate on PE today.  [5/31/2022]---------------------------------------------------------------------    Mr. Gamboa is here today for 6 week sx recheck and f/u for acute bacterial Prostatitis.  Positive urine Cx grew >100,000 Klebsiella pneumoniae 5/18/2022 without hematuria, and was treated with on month of Doxycycline.  No PSA on records or received from Dr. Matt Grover who treats his for TRT, but he states he had been out of TRT for 5 months at last visit.    Acute bacterial prostatitis  reculture urine  UA, to confirm  Microhematuria has resolved with treatment.    Ankylosing spondylitis  Patient to alert Dr. Medel of UTI, Acute Prostatitis.   [7/13/2022]----------------------------------------------------------------------------------------------------      Mr. Gamboa returns today for f/u of his testosterone deficiency andand elevated PSA s/p 4 years of TRT .  TRT has been held b/c of elevated PSA values for over 6 mos now.  He returns today with repeat PSA of 7.600 ( which was drawn one month earlier than planned by PCP).   "Course of antx for acute bacterial prostatitis, which presumably was effecting elevated levels.  Low testosterone levels are also likely to be effected by his insomnia r/t uncontrolled pain from Ankylosing Spondylitis.  Negative sleep apnea negative per his history last visit.  C/o prostadynia which was states was a "pressure only".  Also reports severe suprapubic pain one night which resolved on it's own in Feb or March.    PSA RESULTS:                                  PSA                      7.600 (H)           08/10/2022                 PSA                      8.580 (H)           07/15/2022            Testosterone (ng/dL)       Date                     Value                 07/15/2022               261.0            [8/12/2022]----------------------------------------------------------------------------------------------    Mr. Gamboa is a very pleasant 41 yo gentleman who is here today with his wife for 2 month f/u visit with PSA.  Patient states he is voiding well.  C/o severe fatigue and "no labido" off TRT, and states that he would rather take the risk if necessary to have a better quality of life restarting TRT.  PMH includes history of acute bacterial prostatitis, and elevation on TRT provided prior to referral.     -  Elevated prostate specific antigen (PSA)   Patient elected to proceed with MRI Prostate which resulted a prostate size of 17.7 and PI-RADS of 1, but exam quality effected by motion.  They recomment repeating f/u MRI in one year.              Patient elects to f/u 3 months with repeat PSA, and if elevated repeat MRI even if he has to pay out of pocket to rule out cancer and possibly be started back on TRT ("severe fatigue and no labido").  Risks of TRT known to patient.    -  Testosterone deficiency in male   TRT contraindicated  [9/15/2022]-----------------------------------------------------------------------------------------------      Patient returns today for 3 month f/u visit    -  " "PSA Elevation:  · PSA · 4.080 (H) · 09/13/2022  · PSA · 7.600 (H) · 08/10/2022  · PSA · 8.580 (H) · 07/15/2022   improved following treatment for acute prostatitis   Patient elected to f/u 3 months with repeat PSA, and if elevated repeat MRI even if he has to pay out of pocket to rule out cancer and possibly be started back on TRT ("severe fatigue and no labido").  Risks of TRT known to patient.    -   Testosterone deficiency in male:   I reviewed prostate elevation with MRI Prostate results with Dr. Anaya and patients request to restart TRT r/t c/o severe exhaustion and "no labido".  Dr. Anaya recommends restarting TRT.  Patient states he is tolerating topical replacement, however he started to run out of his medicine and has only been using 1 pump.  F/u appt was missed.  Patient states he feels much improved with TRT - labido improved at least 50 % and not having to use Cialis.     Testosterone (ng/dL)  · 07/15/2022 · 261.0  [12/28/2022]-----------------------------------------------------------------------------------        Review of Systems   Constitutional:  Negative for activity change, appetite change, chills, diaphoresis, fatigue, fever and unexpected weight change.   Eyes:  Negative for visual disturbance.   Respiratory: Negative.     Cardiovascular: Negative.    Gastrointestinal: Negative.    Genitourinary:  Negative for decreased urine volume, difficulty urinating, dysuria, enuresis, flank pain, frequency, genital sores, hematuria, penile discharge, penile pain, penile swelling, scrotal swelling, testicular pain and urgency.        Feeling of not emptying, weak stream, decreased urination with frequency resolved following 6 weeks of antx for underlying prostatitis.   Musculoskeletal:  Positive for back pain.        Chronic   Skin: Negative.    Neurological: Negative.  Negative for dizziness and light-headedness.     Objective:      Physical Exam  Vitals and nursing note reviewed. Exam conducted with a " chaperone present (spouse present for exam).   Constitutional:       General: He is not in acute distress.     Appearance: Normal appearance. He is normal weight. He is not ill-appearing, toxic-appearing or diaphoretic.   HENT:      Mouth/Throat:      Pharynx: Oropharynx is clear.   Eyes:      General: No scleral icterus.        Right eye: No discharge.         Left eye: No discharge.   Cardiovascular:      Rate and Rhythm: Normal rate.   Pulmonary:      Effort: Pulmonary effort is normal.   Abdominal:      General: There is no distension.      Palpations: Abdomen is soft.      Tenderness: There is no abdominal tenderness. There is no right CVA tenderness or left CVA tenderness.   Musculoskeletal:      Right lower leg: No edema.      Left lower leg: No edema.   Skin:     General: Skin is warm and dry.      Coloration: Skin is not jaundiced or pale.      Findings: No rash.   Neurological:      Mental Status: He is alert and oriented to person, place, and time. Mental status is at baseline.   Psychiatric:         Mood and Affect: Mood normal.         Behavior: Behavior normal.         Thought Content: Thought content normal.         Judgment: Judgment normal.       Assessment:       1. Testosterone deficiency in male    2. Elevated prostate specific antigen (PSA)    3. Acute bacterial prostatitis          Plan:       Elevated prostate specific antigen (PSA)  PSA tomorrow morning    Testosterone deficiency in male  Early morning testosterone tomorrow  Will restart full TRT again at 2 pumps and have him F/u one month for symptom recheck and repeated total testosterone level  Consult placed with Dr. Pride per patient request

## 2023-01-19 ENCOUNTER — SPECIALTY PHARMACY (OUTPATIENT)
Dept: PHARMACY | Facility: CLINIC | Age: 43
End: 2023-01-19

## 2023-01-19 DIAGNOSIS — M45.9 ANKYLOSING SPONDYLITIS, UNSPECIFIED SITE OF SPINE: Primary | ICD-10-CM

## 2023-01-20 NOTE — TELEPHONE ENCOUNTER
Taltz for AS order in [facility], prior authorization required. Outside provider, [facility] signed referral form and pt chart notes needed if [facility] would like us to assist. Called office but [facility] office is closed, [facility] Monday-Thurs. 8:30-5 pm. Referral form faxed to [phone].     Call attempt to pt to notify PA needed for Taltz but we need signed referral form from outside provider. Also, see whether he already had loading dose Taltz. Prescription sent in only monthly maintenance dose. No answer, LVM.

## 2023-01-23 NOTE — TELEPHONE ENCOUNTER
Incoming call from patient's deonteniki. Informed her that [facility] is waiting on outside provider referral form to be completed by doctor and faxed back to [facility] with chart notes in order to submit PA to new plan. Patient has been on Taltz for a while now and does not require a loading dose.

## 2023-01-24 NOTE — TELEPHONE ENCOUNTER
Signed referral form and recent rheumatology chart notes and labs needed for Taltz continuation PA. Request faxed on Friday 1/20/23.     No response yet, Called Dr. Fitzgerald office and and left message.

## 2023-01-26 NOTE — TELEPHONE ENCOUNTER
Call attempt 2 follow-up with Dr. Craig office to let them know Taltz requires PA. If office would like [facility] to assist/submit PA request we need pts recent rheum chart notes and labs pertaining to AS as well as signed referral form that was faxed to office 1/20/23.     No answer- LVM

## 2023-01-26 NOTE — TELEPHONE ENCOUNTER
Incoming call from nurse at Dr. Craig office regarding PA for Taltz. Nurse stated that the office's PA department is working on the PA. Assigned [facility] notified.

## 2023-02-01 NOTE — TELEPHONE ENCOUNTER
Incoming call from nurse with Dr. Craig office to check PA status, she meant to call the insurance. Nurse stated she has the insurance phone number. No further questions/concerns.

## 2023-02-01 NOTE — TELEPHONE ENCOUNTER
Signed referral form and chart notes/labs received from office and scanned to media. Taltz for AS continuation of therapy Urgent request sent to plan cmm Key: [id number].     Called pt to notify- no answer

## 2023-02-06 ENCOUNTER — OFFICE VISIT (OUTPATIENT)
Dept: NEUROLOGY | Facility: CLINIC | Age: 43
End: 2023-02-06
Payer: COMMERCIAL

## 2023-02-06 VITALS
DIASTOLIC BLOOD PRESSURE: 72 MMHG | HEIGHT: 72 IN | SYSTOLIC BLOOD PRESSURE: 146 MMHG | BODY MASS INDEX: 23.7 KG/M2 | OXYGEN SATURATION: 98 % | HEART RATE: 74 BPM | WEIGHT: 175 LBS

## 2023-02-06 DIAGNOSIS — G56.03 CARPAL TUNNEL SYNDROME ON BOTH SIDES: Primary | ICD-10-CM

## 2023-02-06 DIAGNOSIS — E03.9 HYPOTHYROIDISM, UNSPECIFIED TYPE: ICD-10-CM

## 2023-02-06 DIAGNOSIS — E53.8 VITAMIN B12 DEFICIENCY: ICD-10-CM

## 2023-02-06 DIAGNOSIS — E55.9 VITAMIN D DEFICIENCY: ICD-10-CM

## 2023-02-06 PROCEDURE — 3077F SYST BP >= 140 MM HG: CPT | Mod: ,,, | Performed by: NURSE PRACTITIONER

## 2023-02-06 PROCEDURE — 99204 OFFICE O/P NEW MOD 45 MIN: CPT | Mod: S$PBB,,, | Performed by: NURSE PRACTITIONER

## 2023-02-06 PROCEDURE — 3077F PR MOST RECENT SYSTOLIC BLOOD PRESSURE >= 140 MM HG: ICD-10-PCS | Mod: ,,, | Performed by: NURSE PRACTITIONER

## 2023-02-06 PROCEDURE — 3008F BODY MASS INDEX DOCD: CPT | Mod: ,,, | Performed by: NURSE PRACTITIONER

## 2023-02-06 PROCEDURE — 1160F RVW MEDS BY RX/DR IN RCRD: CPT | Mod: ,,, | Performed by: NURSE PRACTITIONER

## 2023-02-06 PROCEDURE — 1160F PR REVIEW ALL MEDS BY PRESCRIBER/CLIN PHARMACIST DOCUMENTED: ICD-10-PCS | Mod: ,,, | Performed by: NURSE PRACTITIONER

## 2023-02-06 PROCEDURE — 1159F PR MEDICATION LIST DOCUMENTED IN MEDICAL RECORD: ICD-10-PCS | Mod: ,,, | Performed by: NURSE PRACTITIONER

## 2023-02-06 PROCEDURE — 99204 PR OFFICE/OUTPT VISIT, NEW, LEVL IV, 45-59 MIN: ICD-10-PCS | Mod: S$PBB,,, | Performed by: NURSE PRACTITIONER

## 2023-02-06 PROCEDURE — 99214 OFFICE O/P EST MOD 30 MIN: CPT | Mod: PBBFAC | Performed by: NURSE PRACTITIONER

## 2023-02-06 PROCEDURE — 3078F PR MOST RECENT DIASTOLIC BLOOD PRESSURE < 80 MM HG: ICD-10-PCS | Mod: ,,, | Performed by: NURSE PRACTITIONER

## 2023-02-06 PROCEDURE — 1159F MED LIST DOCD IN RCRD: CPT | Mod: ,,, | Performed by: NURSE PRACTITIONER

## 2023-02-06 PROCEDURE — 3008F PR BODY MASS INDEX (BMI) DOCUMENTED: ICD-10-PCS | Mod: ,,, | Performed by: NURSE PRACTITIONER

## 2023-02-06 PROCEDURE — 3078F DIAST BP <80 MM HG: CPT | Mod: ,,, | Performed by: NURSE PRACTITIONER

## 2023-02-06 RX ORDER — PREGABALIN 25 MG/1
25 CAPSULE ORAL 3 TIMES DAILY
Qty: 90 CAPSULE | Refills: 6 | Status: SHIPPED | OUTPATIENT
Start: 2023-02-06 | End: 2023-08-07

## 2023-02-06 NOTE — PATIENT INSTRUCTIONS
Reviewed prior EMG/NCS from 2020  Start lyrica, 25mg daily for 4 days then twice daily for 4 days then three times daily  Recommend wearing wrist splints at night  Labs as ordered

## 2023-02-06 NOTE — TELEPHONE ENCOUNTER
Taltz status check- PA is still pending. Per [employer] rep, [place] office submitted PA for 3 pen pack therefore claim rejects and New PA needs to be completed for maintenance dose. New auth submitted for request ref # [id number] which was submitted incorrectly by office on 1/26/23. Rep states 24-72 hours turnaround time.
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
## 2023-02-07 ENCOUNTER — TELEPHONE (OUTPATIENT)
Dept: UROLOGY | Facility: CLINIC | Age: 43
End: 2023-02-07
Payer: COMMERCIAL

## 2023-02-07 ENCOUNTER — PATIENT MESSAGE (OUTPATIENT)
Dept: UROLOGY | Facility: CLINIC | Age: 43
End: 2023-02-07
Payer: COMMERCIAL

## 2023-02-07 DIAGNOSIS — E53.8 VITAMIN B12 DEFICIENCY: Primary | ICD-10-CM

## 2023-02-07 DIAGNOSIS — E55.9 VITAMIN D DEFICIENCY: ICD-10-CM

## 2023-02-07 RX ORDER — ERGOCALCIFEROL 1.25 MG/1
50000 CAPSULE ORAL
Qty: 4 CAPSULE | Refills: 2 | Status: SHIPPED | OUTPATIENT
Start: 2023-02-07 | End: 2023-02-08 | Stop reason: SDUPTHER

## 2023-02-07 RX ORDER — CYANOCOBALAMIN 1000 UG/ML
1000 INJECTION, SOLUTION INTRAMUSCULAR; SUBCUTANEOUS
Qty: 1 ML | Refills: 11 | Status: SHIPPED | OUTPATIENT
Start: 2023-02-07 | End: 2023-02-08 | Stop reason: SDUPTHER

## 2023-02-07 NOTE — TELEPHONE ENCOUNTER
----- Message from BURTON Dong sent at 2/7/2023  7:44 AM CST -----  Alert patient that his PSA is wnr.  I called and got no answer.  I left him a voice message that I called and will call  him tomorrow.

## 2023-02-07 NOTE — TELEPHONE ENCOUNTER
Dexilant not covered by insurance can use prilosce prevacid or protonix which one do you want to change her to?

## 2023-02-07 NOTE — TELEPHONE ENCOUNTER
----- Message from BURTON Dong sent at 2/7/2023  7:44 AM CST -----  Alert patient that his PSA is wnr.  I called pt and got no answer.  Left message on his voice mail that I called from   urology and will call later.

## 2023-02-08 ENCOUNTER — TELEPHONE (OUTPATIENT)
Dept: UROLOGY | Facility: CLINIC | Age: 43
End: 2023-02-08
Payer: COMMERCIAL

## 2023-02-08 ENCOUNTER — TELEPHONE (OUTPATIENT)
Dept: NEUROLOGY | Facility: CLINIC | Age: 43
End: 2023-02-08
Payer: COMMERCIAL

## 2023-02-08 ENCOUNTER — SPECIALTY PHARMACY (OUTPATIENT)
Dept: PHARMACY | Facility: CLINIC | Age: 43
End: 2023-02-08

## 2023-02-08 DIAGNOSIS — E55.9 VITAMIN D DEFICIENCY: ICD-10-CM

## 2023-02-08 DIAGNOSIS — E53.8 VITAMIN B12 DEFICIENCY: ICD-10-CM

## 2023-02-08 DIAGNOSIS — M45.9 ANKYLOSING SPONDYLITIS, UNSPECIFIED SITE OF SPINE: Primary | ICD-10-CM

## 2023-02-08 RX ORDER — CYANOCOBALAMIN 1000 UG/ML
1000 INJECTION, SOLUTION INTRAMUSCULAR; SUBCUTANEOUS
Qty: 1 ML | Refills: 11 | Status: SHIPPED | OUTPATIENT
Start: 2023-02-08

## 2023-02-08 RX ORDER — PANTOPRAZOLE SODIUM 40 MG/1
40 TABLET, DELAYED RELEASE ORAL DAILY
Qty: 90 TABLET | Refills: 3 | Status: SHIPPED | OUTPATIENT
Start: 2023-02-08 | End: 2023-10-23 | Stop reason: SDUPTHER

## 2023-02-08 RX ORDER — PANTOPRAZOLE SODIUM 40 MG/1
40 TABLET, DELAYED RELEASE ORAL DAILY
COMMUNITY
End: 2023-02-08 | Stop reason: SDUPTHER

## 2023-02-08 RX ORDER — TESTOSTERONE CYPIONATE 200 MG/ML
1 INJECTION, SOLUTION INTRAMUSCULAR
COMMUNITY
End: 2023-02-15 | Stop reason: ALTCHOICE

## 2023-02-08 RX ORDER — ERGOCALCIFEROL 1.25 MG/1
50000 CAPSULE ORAL
Qty: 4 CAPSULE | Refills: 2 | Status: SHIPPED | OUTPATIENT
Start: 2023-02-08

## 2023-02-08 RX ORDER — CYANOCOBALAMIN 1000 UG/ML
1000 INJECTION, SOLUTION INTRAMUSCULAR; SUBCUTANEOUS
Qty: 1 ML | Refills: 11 | OUTPATIENT
Start: 2023-02-08

## 2023-02-08 RX ORDER — ERGOCALCIFEROL 1.25 MG/1
50000 CAPSULE ORAL
Qty: 4 CAPSULE | Refills: 2 | OUTPATIENT
Start: 2023-02-08

## 2023-02-08 NOTE — TELEPHONE ENCOUNTER
----- Message from BURTON Dong sent at 2/7/2023  7:44 AM CST -----  Alert patient that his PSA is wnr.  I called  pt back and told him that NAVYA Monzon looked at his Testosterone level and it is 281.  6 months ago it was 261.  She said that it is up to normal range, although on the low end of normal.  NP said she has sent in a referral to Dr Pride for him, but she is not sure if Dr Pride will take that diagnosis or not.  If pt has gotten a call from Dr Pride's office with appointment, that is good.  If not, she is not sure if he will hear from his office.  She said pt can also follow up with Dr Osman for testosterone level and TRT adjustments/refills.  Pt asked if we will send Dr Osman something about his labs, and I told him Dr [facility] and can view any of his records in the system.  He asked if he is just supposed to go see him and tell him NP is leaving and he is supposed to follow up with him on this.  I told pt I will call over there and let  or his nurse know what is going on.

## 2023-02-08 NOTE — TELEPHONE ENCOUNTER
Taltz for AS prior authorization Ref # [id number] approved from 2/7/2023 to 02/06/2024, $692 copay at [facility]. Notified pt of approval and states he did have Taltz copay card but does not have info on him.     Rep. Erin at [employer] reached, copay card info obtained and added to [facility], $5 copay at [facility].     Queued for initial consult, [facility] new pt but not new to therapy.

## 2023-02-08 NOTE — TELEPHONE ENCOUNTER
----- Message from BURTON Dong sent at 2/7/2023  7:44 AM CST -----  Alert patient that his PSA is wnr.  I called Dr Osman's office and spoke with Winter.  Informed her that NAVYA Nicolás is leaving our facility.  She would like for him to follow up with Dr Osman for testosterone levels and TRT refills and adjustments.  She rang me back to nurse Reeves.  She did not answer, but I left a voice message regarding this.  She can call me back at [phone].  I was told pt has appointment with Dr Osman on 2-20-23 at 7:30am.

## 2023-02-08 NOTE — TELEPHONE ENCOUNTER
----- Message from BURTON Dong sent at 2/7/2023  7:44 AM CST -----  Alert patient that his PSA is wnr.  I called and spoke with the pt.  Informed him of his lab results. PSA WNR.  He voiced understanding.  He is asking about his Testosterone level.  I told him I will check with NAVYA Monzon on that level.  Will call him back.  He voiced understanding.

## 2023-02-08 NOTE — TELEPHONE ENCOUNTER
Specialty Pharmacy - Initial Clinical Assessment    Specialty Medication Orders Linked to Encounter      Flowsheet Row Most Recent Value   Medication #1 ixekizumab (TALTZ AUTOINJECTOR) 80 mg/mL AtIn (Order#[id number], Rx#[id number])          Patient Diagnosis   M45.9 - Ankylosing spondylitis, unspecified site of spine    Subjective    Alda Gamboa is a 42 y.o. male, who is followed by the specialty pharmacy service for management and education.    Recent Encounters       Date Type Provider Description    02/08/2023 Specialty Pharmacy Alison Tapia PharmD Initial Clinical Assessment    01/19/2023 Specialty Pharmacy Tomy Pike PharmD Referral Authorization          Clinical call attempts since last clinical assessment   No call attempts found.     Current Outpatient Medications   Medication Sig    clonazePAM (KLONOPIN) 1 MG tablet Take 1 tablet (1 mg total) by mouth 2 (two) times daily as needed for Anxiety.    cyanocobalamin 1,000 mcg/mL injection Inject 1 mL (1,000 mcg total) into the muscle every 30 days.    cyclobenzaprine (FLEXERIL) 10 MG tablet Take 1 tablet (10 mg total) by mouth every evening.    diclofenac (VOLTAREN) 75 MG EC tablet Take 75 mg by mouth 2 (two) times daily as needed.    ergocalciferol (ERGOCALCIFEROL) 50,000 unit Cap Take 1 capsule (50,000 Units total) by mouth every 7 days.    ixekizumab (TALTZ AUTOINJECTOR SUBQ) Inject 80 mg into the skin every 28 days. 80 mg per 1 ml autoinjection.    ixekizumab (TALTZ AUTOINJECTOR) 80 mg/mL AtIn Inject 1 mL (80 mg) into the skin every 4 weeks    pantoprazole (PROTONIX) 40 MG tablet Take 40 mg by mouth once daily.    pregabalin (LYRICA) 25 MG capsule Take 1 capsule (25 mg total) by mouth 3 (three) times daily.    sertraline (ZOLOFT) 100 MG tablet 1/2 tab po q AM x 7 days, the 1 tab po daily    tadalafiL (CIALIS) 20 MG Tab Take 1 tablet (20 mg total) by mouth once daily.    testosterone cypionate (DEPO-TESTOSTERONE) 200 mg/mL injection 1 mL.   Last  reviewed on 2/8/2023 10:01 AM by Lucy Ly LPN    Review of patient's allergies indicates:   Allergen Reactions    Pcn [penicillins] Hives and Swelling     Throat swelling    Neurontin [gabapentin]    Last reviewed on  2/8/2023 9:46 AM by Alison Tapia          Assessment Questions - Documented Responses      Flowsheet Row Most Recent Value   Assessment    Medication Reconciliation completed for patient Yes   During the past 4 weeks, has patient missed any activities due to condition or medication? No   During the past 4 weeks, did patient have any of the following urgent care visits? None   Goals of Therapy Status Achieving   Status of the patients ability to self-administer: Is Able   All education points have been covered with patient? No, patient declined- printed education provided  [counseling declined- pt has been on taltz]   Welcome packet contents reviewed and discussed with patient? Yes   Assesment completed? Yes   Plan Therapy being initiated   Do you need to open a clinical intervention (i-vent)? No   Do you want to schedule first shipment? Yes   Medication #1 Assessment Info    Patient status Existing medication, New to [facility]   Is this medication appropriate for the patient? Yes   Is this medication effective? Yes          Refill Questions - Documented Responses      Flowsheet Row Most Recent Value   Patient Availability and HIPAA Verification    Does patient want to proceed with activity? Yes   HIPAA/medical authority confirmed? Yes   Relationship to patient of person spoken to? Self   Refill Screening Questions    When does the patient need to receive the medication? 02/09/23   Refill Delivery Questions    How will the patient receive the medication? Mail   When does the patient need to receive the medication? 02/09/23   Shipping Address Home   Address in [facility] confirmed and updated if neccessary? Yes   Expected Copay ($) 5   Is the patient able to afford the medication copay? Yes   Payment  Method CC on file   Days supply of Refill 28   Supplies needed? No supplies needed   Refill activity completed? Yes   Refill activity plan Refill scheduled   Shipment/Pickup Date: 02/08/23            Objective    He has a past medical history of Acute bacterial prostatitis (5/31/2022), Ankylosing spondylitis (5/31/2022), Back pain, Elevated prostate specific antigen (PSA) (8/12/2022), Neck pain, and Spondylosis.    Tried/failed medications: Humira, Cimzia, Cosentyx    BP Readings from Last 4 Encounters:   02/06/23 (!) 146/72   12/28/22 124/82   11/18/22 136/84   09/15/22 128/80     Ht Readings from Last 4 Encounters:   02/06/23 6' (1.829 m)   12/28/22 6' (1.829 m)   11/18/22 6' (1.829 m)   09/15/22 6' (1.829 m)     Wt Readings from Last 4 Encounters:   02/06/23 79.4 kg (175 lb)   12/28/22 79.8 kg (176 lb)   11/18/22 79.8 kg (176 lb)   09/15/22 80.7 kg (178 lb)     Recent Labs   Lab Result Units 02/06/23  1122   Creatinine mg/dL 0.99  0.99   ALT U/L 29   AST U/L 22     The goals of prescribed drug therapy management include:  Supporting patient to meet the prescriber's medical treatment objectives  Improving or maintaining quality of life  Maintaining optimal therapy adherence  Minimizing and managing side effects      Goals of Therapy Status: Achieving    Assessment/Plan  Patient plans to continue therapy without changes      Indication, dosage, appropriateness, effectiveness, safety and convenience of his specialty medication(s) were reviewed today.     Patient Education   Pharmacist offer to  patient was declined. Printed educational materials will be provided with medication.      Pt has been on Taltz, declined counseling on injection process, common SE, and warnings/precautions. [facility] services and hours of operations discussed. Pt had no further questions or concerns.     Tasks added this encounter   3/2/2023 - Refill Call (Auto Added)  11/8/2023 - Clinical - Follow Up Assesement (Annual)   Tasks due  within next 3 months   No tasks due.     Alison Tapia, PharmD  [facility]  [address]  [facility] [address]  Phone: [phone]  Fax: [phone]

## 2023-02-08 NOTE — TELEPHONE ENCOUNTER
Called pt's friend Patience Lowery sara gave her the information below from BRITTANEY Brooks NP. She v/u. I tried several times to call pt but unable to get him left vm.            ----- Message from BURTON Layne sent at 2/7/2023  1:06 PM CST -----  Vitamin b12 and vitamin d are both low.  I sent in for B12 injections and vitamin d weekly tablet to take for 3 months

## 2023-02-08 NOTE — TELEPHONE ENCOUNTER
----- Message from BURTON Layne sent at 2/7/2023  1:06 PM CST -----  Vitamin b12 and vitamin d are both low.  I sent in for B12 injections and vitamin d weekly tablet to take for 3 months

## 2023-02-08 NOTE — TELEPHONE ENCOUNTER
----- Message from BURTON Dong sent at 2/7/2023  7:44 AM CST -----  Alert patient that his PSA is wnr.  I called pt back and told him that I had called Dr Osman's office and spoke with  and she said he has an appointment with Dr Osman on 2-20-23 at 7:30am.  I left voice message for the nurse Lala regarding his testosterone levels and refills and adjustments.  Also, when he does see Dr Osman this month, he can let the pt know when he needs to get his levels drawn again.  Pt voiced understanding.

## 2023-02-14 ENCOUNTER — OFFICE VISIT (OUTPATIENT)
Dept: RHEUMATOLOGY | Facility: CLINIC | Age: 43
End: 2023-02-14
Payer: COMMERCIAL

## 2023-02-14 VITALS
BODY MASS INDEX: 23.03 KG/M2 | SYSTOLIC BLOOD PRESSURE: 118 MMHG | WEIGHT: 170 LBS | DIASTOLIC BLOOD PRESSURE: 80 MMHG | HEIGHT: 72 IN | OXYGEN SATURATION: 98 % | RESPIRATION RATE: 16 BRPM | HEART RATE: 86 BPM

## 2023-02-14 DIAGNOSIS — M45.9 ANKYLOSING SPONDYLITIS, UNSPECIFIED SITE OF SPINE: ICD-10-CM

## 2023-02-14 DIAGNOSIS — E29.1 TESTOSTERONE DEFICIENCY IN MALE: Primary | ICD-10-CM

## 2023-02-14 PROCEDURE — 3074F PR MOST RECENT SYSTOLIC BLOOD PRESSURE < 130 MM HG: ICD-10-PCS | Mod: ,,, | Performed by: INTERNAL MEDICINE

## 2023-02-14 PROCEDURE — 3079F PR MOST RECENT DIASTOLIC BLOOD PRESSURE 80-89 MM HG: ICD-10-PCS | Mod: ,,, | Performed by: INTERNAL MEDICINE

## 2023-02-14 PROCEDURE — 99205 OFFICE O/P NEW HI 60 MIN: CPT | Mod: S$PBB,,, | Performed by: INTERNAL MEDICINE

## 2023-02-14 PROCEDURE — 99215 OFFICE O/P EST HI 40 MIN: CPT | Mod: PBBFAC | Performed by: INTERNAL MEDICINE

## 2023-02-14 PROCEDURE — 1160F RVW MEDS BY RX/DR IN RCRD: CPT | Mod: ,,, | Performed by: INTERNAL MEDICINE

## 2023-02-14 PROCEDURE — 3008F PR BODY MASS INDEX (BMI) DOCUMENTED: ICD-10-PCS | Mod: ,,, | Performed by: INTERNAL MEDICINE

## 2023-02-14 PROCEDURE — 3079F DIAST BP 80-89 MM HG: CPT | Mod: ,,, | Performed by: INTERNAL MEDICINE

## 2023-02-14 PROCEDURE — 99205 PR OFFICE/OUTPT VISIT, NEW, LEVL V, 60-74 MIN: ICD-10-PCS | Mod: S$PBB,,, | Performed by: INTERNAL MEDICINE

## 2023-02-14 PROCEDURE — 1160F PR REVIEW ALL MEDS BY PRESCRIBER/CLIN PHARMACIST DOCUMENTED: ICD-10-PCS | Mod: ,,, | Performed by: INTERNAL MEDICINE

## 2023-02-14 PROCEDURE — 1159F MED LIST DOCD IN RCRD: CPT | Mod: ,,, | Performed by: INTERNAL MEDICINE

## 2023-02-14 PROCEDURE — 3074F SYST BP LT 130 MM HG: CPT | Mod: ,,, | Performed by: INTERNAL MEDICINE

## 2023-02-14 PROCEDURE — 3008F BODY MASS INDEX DOCD: CPT | Mod: ,,, | Performed by: INTERNAL MEDICINE

## 2023-02-14 PROCEDURE — 1159F PR MEDICATION LIST DOCUMENTED IN MEDICAL RECORD: ICD-10-PCS | Mod: ,,, | Performed by: INTERNAL MEDICINE

## 2023-02-14 RX ORDER — IXEKIZUMAB 80 MG/ML
80 INJECTION, SOLUTION SUBCUTANEOUS
Qty: 1 ML | Refills: 6 | Status: SHIPPED | OUTPATIENT
Start: 2023-02-14 | End: 2023-04-27

## 2023-02-14 NOTE — PROGRESS NOTES
Anuradha Cruz MD   [facility]  [address] [place] [address]  [phone]      PATIENT NAME: Alda Gamboa  : 1980  DATE: 23  MRN: [id number]      Billing Provider: Anuradha Cruz MD  Level of Service:   Patient PCP Information       Provider PCP Type    New Osman MD General            Reason for Visit / Chief Complaint: No chief complaint on file.           Assessment and Plan (including Health Maintenance)      Problem List  Smart Sets  Document Outside HM   :    Plan:     Ankylosing spondylitis, unspecified site of spine  -     Ambulatory referral/consult to Rheumatology             Total time spent in Assessment, Co-ordination of Care , Review of Care Plan , Goal Setting and Counseling on this initial visit is ~ 60 minutes     There is currently no information documented on the homunculus. Go to the Rheumatology activity and complete the homunculus joint exam.               History of Present Illness / Problem Focused Workflow     Alda Gamboa presents to the clinic with No chief complaint on file.     Has been living with back pain since his teens. Has been assessed by chiropractor in the earlier years who was the first to suspect a systemic inflammatory syndrome. Was then referred to Dr. Medel and was then subsequently attached to Rheumatology at [place].   Humira was started by Dr. Cano in [place]. Was on mtx previousy which really upset his stomach.   Also tried and failed cimzia.   Has been on Taltz for a year.   Also takes diclofenac twice daily   Works as an   Physical fatigue +   Kinesthetic person.   Lower extremity radiculopathy.   Also known to have low testosterone and was on replacement - developed prostatitis off testosterone.       Review of Systems     Review of Systems    Medical / Social / Family History     Past Medical History:   Diagnosis Date    Acute bacterial prostatitis 2022     Positive Cx for UTI with scant hematuria Treated with 10 day course of Bactrim DS, completed yesterday    Ankylosing spondylitis 5/31/2022    Patient takes immunosuppression - recently changed to Cimzia, Taltz Followed by Dr. Medel for this    Back pain     Elevated prostate specific antigen (PSA) 8/12/2022    Lab Results Component Value Date  PSA 7.600 (H) 08/10/2022  PSA 8.580 (H) 07/15/2022 s/p month of antx for tx of acute exacerbation of chronic prostatitis    Neck pain     Spondylosis        Past Surgical History:   Procedure Laterality Date    CYST REMOVAL Right     shoulder    HERNIA REPAIR      LUMBAR DISCECTOMY  02/2018    L 4 L5    TONSILLECTOMY         Social History  Mr. Alda Gamboa  reports that he has quit smoking. He has been exposed to tobacco smoke. He has never used smokeless tobacco. He reports that he does not currently use alcohol. He reports that he does not currently use drugs.    Family History  'hiram Gamboa family history includes Cancer in his maternal grandfather; Lung cancer in his father; Pacemaker/defibrilator in his mother.    Medications and Allergies     Medications  No outpatient medications have been marked as taking for the 2/14/23 encounter (Office Visit) with Anuradha Cruz MD.       Allergies  Review of patient's allergies indicates:   Allergen Reactions    Pcn [penicillins] Hives and Swelling     Throat swelling    Neurontin [gabapentin]        Physical Examination   There were no vitals filed for this visit.  Physical Exam           Signature:  Anuradha Cruz MD  [facility]  [address] [place] [id number]  [phone]    Date of encounter: 2/14/23

## 2023-02-15 ENCOUNTER — PATIENT MESSAGE (OUTPATIENT)
Dept: RHEUMATOLOGY | Facility: CLINIC | Age: 43
End: 2023-02-15
Payer: COMMERCIAL

## 2023-02-15 RX ORDER — TESTOSTERONE 50 MG/5G
2-3 GEL TRANSDERMAL DAILY
Qty: 2 EACH | Refills: 2 | Status: SHIPPED | OUTPATIENT
Start: 2023-02-15 | End: 2023-10-23 | Stop reason: SDUPTHER

## 2023-02-22 ENCOUNTER — PATIENT OUTREACH (OUTPATIENT)
Dept: ADMINISTRATIVE | Facility: HOSPITAL | Age: 43
End: 2023-02-22

## 2023-02-23 NOTE — PROGRESS NOTES
Population Health review of encounter with date of service 02/20/2023 with Dr. Osman.  No show to appt not scheduled as HY or [facility].  Has HY scheduled for 05/22/2023.

## 2023-03-01 ENCOUNTER — PATIENT MESSAGE (OUTPATIENT)
Dept: RHEUMATOLOGY | Facility: CLINIC | Age: 43
End: 2023-03-01
Payer: COMMERCIAL

## 2023-03-02 ENCOUNTER — SPECIALTY PHARMACY (OUTPATIENT)
Dept: PHARMACY | Facility: CLINIC | Age: 43
End: 2023-03-02

## 2023-04-03 ENCOUNTER — SPECIALTY PHARMACY (OUTPATIENT)
Dept: PHARMACY | Facility: CLINIC | Age: 43
End: 2023-04-03

## 2023-04-03 NOTE — TELEPHONE ENCOUNTER
Specialty Pharmacy - Refill Coordination    Specialty Medication Orders Linked to Encounter      Flowsheet Row Most Recent Value   Medication #1 ixekizumab (TALTZ AUTOINJECTOR) 80 mg/mL AtIn (Order#[id number], Rx#[id number])            Refill Questions - Documented Responses      Flowsheet Row Most Recent Value   Patient Availability and HIPAA Verification    Does patient want to proceed with activity? Yes   HIPAA/medical authority confirmed? Yes   Relationship to patient of person spoken to? Self   Refill Screening Questions    Changes to allergies? No   Changes to medications? No   New conditions since last clinic visit? No   Unplanned office visit, urgent care, ED, or hospital admission in the last 4 weeks? No   How does patient/caregiver feel medication is working? Good   Financial problems or insurance changes? No   How many doses of your specialty medications were missed in the last 4 weeks? 0   Would patient like to speak to a pharmacist? No   When does the patient need to receive the medication? 04/10/23   Refill Delivery Questions    How will the patient receive the medication? Mail   When does the patient need to receive the medication? 04/10/23   Shipping Address Prescription   Address in [facility] confirmed and updated if neccessary? Yes   Expected Copay ($) 5   Is the patient able to afford the medication copay? Yes   Payment Method CC on file   Days supply of Refill 28   Supplies needed? No supplies needed   Refill activity completed? Yes   Refill activity plan Refill scheduled   Shipment/Pickup Date: 04/04/23            Current Outpatient Medications   Medication Sig    clonazePAM (KLONOPIN) 1 MG tablet Take 1 tablet (1 mg total) by mouth 2 (two) times daily as needed for Anxiety.    cyanocobalamin 1,000 mcg/mL injection Inject 1 mL (1,000 mcg total) into the muscle every 30 days.    cyclobenzaprine (FLEXERIL) 10 MG tablet Take 1 tablet (10 mg total) by mouth every evening.    diclofenac  (VOLTAREN) 75 MG EC tablet Take 75 mg by mouth 2 (two) times daily as needed.    ergocalciferol (ERGOCALCIFEROL) 50,000 unit Cap Take 1 capsule (50,000 Units total) by mouth every 7 days.    ixekizumab (TALTZ AUTOINJECTOR) 80 mg/mL AtIn Inject 1 mL (80 mg) into the skin every 4 weeks    pantoprazole (PROTONIX) 40 MG tablet Take 1 tablet (40 mg total) by mouth once daily.    pregabalin (LYRICA) 25 MG capsule Take 1 capsule (25 mg total) by mouth 3 (three) times daily.    sertraline (ZOLOFT) 100 MG tablet 1/2 tab po q AM x 7 days, the 1 tab po daily    tadalafiL (CIALIS) 20 MG Tab Take 1 tablet (20 mg total) by mouth once daily.    testosterone (TESTIM) 50 mg/5 gram (1 %) Gel Apply 2-3 drops topically once daily.   Last reviewed on 2/15/2023  2:16 PM by Anuradha rCuz MD    Review of patient's allergies indicates:   Allergen Reactions    Pcn [penicillins] Hives and Swelling     Throat swelling    Sulfasalazine      Other reaction(s): Unknown    Gabapentin Nausea Only and Rash     Other reaction(s): Unknown    Last reviewed on  3/6/2023 1:46 PM by Fidelia Tapia      Tasks added this encounter   5/1/2023 - Refill Call (Auto Added)   Tasks due within next 3 months   No tasks due.     Shania Villanueva, PharmD  [facility]  [address] [place] [id number]  Phone: [phone]  Fax: [phone]

## 2023-04-09 ENCOUNTER — PATIENT MESSAGE (OUTPATIENT)
Dept: RHEUMATOLOGY | Facility: CLINIC | Age: 43
End: 2023-04-09
Payer: COMMERCIAL

## 2023-04-14 ENCOUNTER — PATIENT MESSAGE (OUTPATIENT)
Dept: RHEUMATOLOGY | Facility: CLINIC | Age: 43
End: 2023-04-14
Payer: COMMERCIAL

## 2023-04-27 ENCOUNTER — OFFICE VISIT (OUTPATIENT)
Dept: NEUROLOGY | Facility: CLINIC | Age: 43
End: 2023-04-27
Payer: COMMERCIAL

## 2023-04-27 VITALS
DIASTOLIC BLOOD PRESSURE: 80 MMHG | WEIGHT: 166 LBS | BODY MASS INDEX: 22.48 KG/M2 | OXYGEN SATURATION: 98 % | SYSTOLIC BLOOD PRESSURE: 118 MMHG | HEART RATE: 86 BPM | HEIGHT: 72 IN

## 2023-04-27 DIAGNOSIS — G56.03 CARPAL TUNNEL SYNDROME ON BOTH SIDES: Primary | ICD-10-CM

## 2023-04-27 PROCEDURE — 99212 OFFICE O/P EST SF 10 MIN: CPT | Mod: S$PBB,,, | Performed by: NURSE PRACTITIONER

## 2023-04-27 PROCEDURE — 3074F PR MOST RECENT SYSTOLIC BLOOD PRESSURE < 130 MM HG: ICD-10-PCS | Mod: ,,, | Performed by: NURSE PRACTITIONER

## 2023-04-27 PROCEDURE — 3074F SYST BP LT 130 MM HG: CPT | Mod: ,,, | Performed by: NURSE PRACTITIONER

## 2023-04-27 PROCEDURE — 3008F PR BODY MASS INDEX (BMI) DOCUMENTED: ICD-10-PCS | Mod: ,,, | Performed by: NURSE PRACTITIONER

## 2023-04-27 PROCEDURE — 99214 OFFICE O/P EST MOD 30 MIN: CPT | Mod: PBBFAC | Performed by: NURSE PRACTITIONER

## 2023-04-27 PROCEDURE — 3079F DIAST BP 80-89 MM HG: CPT | Mod: ,,, | Performed by: NURSE PRACTITIONER

## 2023-04-27 PROCEDURE — 1159F PR MEDICATION LIST DOCUMENTED IN MEDICAL RECORD: ICD-10-PCS | Mod: ,,, | Performed by: NURSE PRACTITIONER

## 2023-04-27 PROCEDURE — 1160F RVW MEDS BY RX/DR IN RCRD: CPT | Mod: ,,, | Performed by: NURSE PRACTITIONER

## 2023-04-27 PROCEDURE — 3079F PR MOST RECENT DIASTOLIC BLOOD PRESSURE 80-89 MM HG: ICD-10-PCS | Mod: ,,, | Performed by: NURSE PRACTITIONER

## 2023-04-27 PROCEDURE — 99212 PR OFFICE/OUTPT VISIT, EST, LEVL II, 10-19 MIN: ICD-10-PCS | Mod: S$PBB,,, | Performed by: NURSE PRACTITIONER

## 2023-04-27 PROCEDURE — 1160F PR REVIEW ALL MEDS BY PRESCRIBER/CLIN PHARMACIST DOCUMENTED: ICD-10-PCS | Mod: ,,, | Performed by: NURSE PRACTITIONER

## 2023-04-27 PROCEDURE — 3008F BODY MASS INDEX DOCD: CPT | Mod: ,,, | Performed by: NURSE PRACTITIONER

## 2023-04-27 PROCEDURE — 1159F MED LIST DOCD IN RCRD: CPT | Mod: ,,, | Performed by: NURSE PRACTITIONER

## 2023-04-27 NOTE — PATIENT INSTRUCTIONS
Continue current lyrica 25mg twice daily  Continue to use the splints at night as directed  Followup in 6 months
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
## 2023-05-01 ENCOUNTER — SPECIALTY PHARMACY (OUTPATIENT)
Dept: PHARMACY | Facility: CLINIC | Age: 43
End: 2023-05-01

## 2023-05-01 DIAGNOSIS — M45.9 ANKYLOSING SPONDYLITIS, UNSPECIFIED SITE OF SPINE: ICD-10-CM

## 2023-05-01 RX ORDER — IXEKIZUMAB 80 MG/ML
80 INJECTION, SOLUTION SUBCUTANEOUS
Qty: 1 ML | Refills: 6 | Status: ACTIVE | OUTPATIENT
Start: 2023-05-01 | End: 2023-11-07 | Stop reason: SDUPTHER

## 2023-05-01 NOTE — TELEPHONE ENCOUNTER
Incoming call regarding Taltz injection, Pt stated he is due to inject on 5/6/23. Informed pt refill request has been sent to provider for approval and will follow up with him as soon as approved. Pt verbalized understanding.

## 2023-05-03 ENCOUNTER — OFFICE VISIT (OUTPATIENT)
Dept: RHEUMATOLOGY | Facility: CLINIC | Age: 43
End: 2023-05-03
Payer: COMMERCIAL

## 2023-05-03 DIAGNOSIS — M45.9 ANKYLOSING SPONDYLITIS, UNSPECIFIED SITE OF SPINE: Primary | ICD-10-CM

## 2023-05-03 PROCEDURE — 99215 PR OFFICE/OUTPT VISIT, EST, LEVL V, 40-54 MIN: ICD-10-PCS | Mod: 95,,, | Performed by: INTERNAL MEDICINE

## 2023-05-03 PROCEDURE — 99215 OFFICE O/P EST HI 40 MIN: CPT | Mod: 95,,, | Performed by: INTERNAL MEDICINE

## 2023-05-03 NOTE — PROGRESS NOTES
Anuradha Cruz MD   [facility]  [address] [place] [address]  [phone]      PATIENT NAME: Alda Gamboa  : 1980  DATE: 5/3/23  MRN: [id number]      Billing Provider: Anuradha Cruz MD  Level of Service:   Patient PCP Information       Provider PCP Type    New Osman MD General            Reason for Visit / Chief Complaint: pain    The patient location is: home  The chief complaint leading to consultation is:    Visit type: audio only     Face to Face time with patient:    30  minutes of total time spent on the encounter, which includes face to face time and non-face to face time preparing to see the patient (eg, review of tests), Obtaining and/or reviewing separately obtained history, Documenting clinical information in the electronic or other health record, Independently interpreting results (not separately reported) and communicating results to the patient/family/caregiver, or Care coordination (not separately reported).         Each patient to whom he or she provides medical services by telemedicine is:  (1) informed of the relationship between the physician and patient and the respective role of any other health care provider with respect to management of the patient; and (2) notified that he or she may decline to receive medical services by telemedicine and may withdraw from such care at any time.    Notes:             Assessment and Plan (including Health Maintenance)      Problem List  Smart Sets  Document Outside HM   :    Plan:     Has started testosterone gel   Also using Taltz now  Prior biologics have helped as well but had presented with wrist flare   However states that his hands and knuckles are still painful [but 'is pulling them out' ].   Tingling in hands -sees neurology for it. Dx with carpal tunnel syndrome. Arms go numb as well.   Per wife - is only able to work 2 hours at a time. Has lost 2 jobs this year  due to lack of productivity. Has been working since age 13 and has only lost 5 weeks of work since age 13. 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 Is working as a  now.   Fatigue is likely related to Lyrica. But if he stops it -then the tingling worsening. My advice is to take time off work to start weaning off Klonipin [ may be contributing to fatigue] and to allow him respite time to also developed either a new trade or get the rehab that he needs.   I can refer him to Aqua therapy as well.       There are no diagnoses linked to this encounter.           Total time spent in Assessment, Co-ordination of Care , Review of Care Plan , Goal Setting and Counseling on this initial visit is ~ 60 minutes     There is currently no information documented on the homunculus. Go to the Rheumatology activity and complete the homunculus joint exam.               History of Present Illness / Problem Focused Workflow     Alda Gamboa presents to the clinic with No chief complaint on file.     Has been living with back pain since his teens. Has been assessed by chiropractor in the earlier years who was the first to suspect a systemic inflammatory syndrome. Was then referred to Dr. Medel and was then subsequently attached to Rheumatology at [place].   Humira was started by Dr. Cano in [place]. Was on mtx previousy which really upset his stomach.   Also tried and failed cimzia.   Has been on Taltz for a year.   Also takes diclofenac twice daily   Works as an   Physical fatigue +   Kinesthetic person.   Lower extremity radiculopathy.   Also known to have low testosterone and was on replacement - developed prostatitis off testosterone.       Review of Systems     Review of Systems    Medical / Social / Family History     Past Medical History:   Diagnosis Date    Acute bacterial prostatitis 5/31/2022    Positive Cx for UTI with scant hematuria Treated with 10 day course of Bactrim DS, completed yesterday    Ankylosing  spondylitis 5/31/2022    Patient takes immunosuppression - recently changed to Cimzia, Taltz Followed by Dr. Medel for this    Back pain     Elevated prostate specific antigen (PSA) 8/12/2022    Lab Results Component Value Date  PSA 7.600 (H) 08/10/2022  PSA 8.580 (H) 07/15/2022 s/p month of antx for tx of acute exacerbation of chronic prostatitis    Neck pain     Spondylosis        Past Surgical History:   Procedure Laterality Date    CYST REMOVAL Right     shoulder    HERNIA REPAIR      LUMBAR DISCECTOMY  02/2018    L 4 L5    TONSILLECTOMY         Social History  Mr. Alda Gamboa  reports that he has quit smoking. He has been exposed to tobacco smoke. He has never used smokeless tobacco. He reports that he does not currently use alcohol. He reports that he does not currently use drugs.    Family History  'hiram Gamboa family history includes Cancer in his maternal grandfather; Crohn's disease in his maternal cousin; Lung cancer in his father; Pacemaker/defibrilator in his mother.    Medications and Allergies     Medications  No outpatient medications have been marked as taking for the 5/3/23 encounter (Appointment) with Anuradha Cruz MD.       Allergies  Review of patient's allergies indicates:   Allergen Reactions    Pcn [penicillins] Hives and Swelling     Throat swelling    Sulfasalazine      Other reaction(s): Unknown    Gabapentin Nausea Only and Rash     Other reaction(s): Unknown       Physical Examination   There were no vitals filed for this visit.  Physical Exam           Signature:  Anuradha Cruz MD  [facility]  [address] [place] [id number]  [phone]    Date of encounter: 5/3/23

## 2023-05-09 ENCOUNTER — OFFICE VISIT (OUTPATIENT)
Dept: FAMILY MEDICINE | Facility: CLINIC | Age: 43
End: 2023-05-09
Payer: COMMERCIAL

## 2023-05-09 VITALS
OXYGEN SATURATION: 97 % | TEMPERATURE: 98 F | DIASTOLIC BLOOD PRESSURE: 94 MMHG | WEIGHT: 163.38 LBS | BODY MASS INDEX: 22.13 KG/M2 | SYSTOLIC BLOOD PRESSURE: 142 MMHG | HEIGHT: 72 IN | HEART RATE: 96 BPM | RESPIRATION RATE: 16 BRPM

## 2023-05-09 DIAGNOSIS — M45.9 ANKYLOSING SPONDYLITIS, UNSPECIFIED SITE OF SPINE: Primary | ICD-10-CM

## 2023-05-09 DIAGNOSIS — M54.2 NECK PAIN: Chronic | ICD-10-CM

## 2023-05-09 PROCEDURE — 1159F PR MEDICATION LIST DOCUMENTED IN MEDICAL RECORD: ICD-10-PCS | Mod: ,,, | Performed by: FAMILY MEDICINE

## 2023-05-09 PROCEDURE — 1160F PR REVIEW ALL MEDS BY PRESCRIBER/CLIN PHARMACIST DOCUMENTED: ICD-10-PCS | Mod: ,,, | Performed by: FAMILY MEDICINE

## 2023-05-09 PROCEDURE — 1159F MED LIST DOCD IN RCRD: CPT | Mod: ,,, | Performed by: FAMILY MEDICINE

## 2023-05-09 PROCEDURE — 3077F PR MOST RECENT SYSTOLIC BLOOD PRESSURE >= 140 MM HG: ICD-10-PCS | Mod: ,,, | Performed by: FAMILY MEDICINE

## 2023-05-09 PROCEDURE — 3008F BODY MASS INDEX DOCD: CPT | Mod: ,,, | Performed by: FAMILY MEDICINE

## 2023-05-09 PROCEDURE — 3008F PR BODY MASS INDEX (BMI) DOCUMENTED: ICD-10-PCS | Mod: ,,, | Performed by: FAMILY MEDICINE

## 2023-05-09 PROCEDURE — 99213 PR OFFICE/OUTPT VISIT, EST, LEVL III, 20-29 MIN: ICD-10-PCS | Mod: ,,, | Performed by: FAMILY MEDICINE

## 2023-05-09 PROCEDURE — 3077F SYST BP >= 140 MM HG: CPT | Mod: ,,, | Performed by: FAMILY MEDICINE

## 2023-05-09 PROCEDURE — 3080F DIAST BP >= 90 MM HG: CPT | Mod: ,,, | Performed by: FAMILY MEDICINE

## 2023-05-09 PROCEDURE — 1160F RVW MEDS BY RX/DR IN RCRD: CPT | Mod: ,,, | Performed by: FAMILY MEDICINE

## 2023-05-09 PROCEDURE — 99213 OFFICE O/P EST LOW 20 MIN: CPT | Mod: ,,, | Performed by: FAMILY MEDICINE

## 2023-05-09 PROCEDURE — 3080F PR MOST RECENT DIASTOLIC BLOOD PRESSURE >= 90 MM HG: ICD-10-PCS | Mod: ,,, | Performed by: FAMILY MEDICINE

## 2023-05-09 NOTE — PROGRESS NOTES
New Osman MD        PATIENT NAME: Alda Gamboa  : 1980  DATE: 23  MRN: [id number]      Billing Provider: New Osman MD  Level of Service: AR OFFICE/OUTPT VISIT, EST, LEVL III, 20-29 MIN  Patient PCP Information       Provider PCP Type    New Osman MD General            Reason for Visit / Chief Complaint: spondylitis (Disability paper work)       Update PCP  Update Chief Complaint         History of Present Illness / Problem Focused Workflow     Alda Gamboa presents to the clinic with spondylitis (Disability paper work)     Has been fired twice due to neuropathy and fatigue and neck pain.  AS helped some with TALTZ.       Review of Systems     Review of Systems   Constitutional:  Positive for fatigue. Negative for activity change, appetite change, fever and unexpected weight change.   HENT:  Negative for congestion, rhinorrhea, sinus pressure, sinus pain, sore throat and trouble swallowing.    Eyes:  Negative for photophobia, pain, discharge and visual disturbance.   Respiratory:  Negative for cough, chest tightness, wheezing and stridor.    Cardiovascular:  Negative for chest pain, palpitations and leg swelling.   Gastrointestinal:  Negative for abdominal pain, blood in stool, constipation, diarrhea and nausea.   Endocrine: Negative for polydipsia, polyphagia and polyuria.   Genitourinary:  Negative for difficulty urinating, flank pain and hematuria.   Musculoskeletal:  Positive for arthralgias. Negative for neck pain.   Skin:  Negative for rash.   Allergic/Immunologic: Negative for food allergies.   Neurological:  Negative for dizziness, tremors, seizures, syncope, weakness (global weakness) and headaches.   Psychiatric/Behavioral:  Negative for behavioral problems, confusion, decreased concentration, dysphoric mood and hallucinations. The patient is not nervous/anxious.       Medical / Social / Family History     Past Medical History:   Diagnosis Date    Acute bacterial  prostatitis 5/31/2022    Positive Cx for UTI with scant hematuria Treated with 10 day course of Bactrim DS, completed yesterday    Ankylosing spondylitis 5/31/2022    Patient takes immunosuppression - recently changed to Cimzia, Talgabino Followed by Dr. Medel for this    Back pain     Elevated prostate specific antigen (PSA) 8/12/2022    Lab Results Component Value Date  PSA 7.600 (H) 08/10/2022  PSA 8.580 (H) 07/15/2022 s/p month of antx for tx of acute exacerbation of chronic prostatitis    Neck pain     Spondylosis        Past Surgical History:   Procedure Laterality Date    CYST REMOVAL Right     shoulder    HERNIA REPAIR      LUMBAR DISCECTOMY  02/2018    L 4 L5    TONSILLECTOMY         Social History    reports that he has quit smoking. He has been exposed to tobacco smoke. He has never used smokeless tobacco. He reports that he does not currently use alcohol. He reports that he does not currently use drugs.    Family History  's family history includes Cancer in his maternal grandfather; Crohn's disease in his maternal cousin; Lung cancer in his father; Pacemaker/defibrilator in his mother.    Medications and Allergies     Medications  No outpatient medications have been marked as taking for the 5/9/23 encounter (Office Visit) with New Osman MD.       Allergies  Review of patient's allergies indicates:   Allergen Reactions    Pcn [penicillins] Hives and Swelling     Throat swelling    Sulfasalazine      Other reaction(s): Unknown    Gabapentin Nausea Only and Rash     Other reaction(s): Unknown       Physical Examination     Vitals:    05/09/23 1608   BP: (!) 142/94   Pulse: 96   Resp: 16   Temp: 98.3 °F (36.8 °C)     Physical Exam  Constitutional:       General: He is not in acute distress.     Appearance: Normal appearance.   HENT:      Head: Normocephalic.      Right Ear: Tympanic membrane and ear canal normal.      Left Ear: Tympanic membrane and ear canal normal.      Nose: Nose normal.       Mouth/Throat:      Mouth: Mucous membranes are moist.      Pharynx: No oropharyngeal exudate.   Eyes:      Extraocular Movements: Extraocular movements intact.      Pupils: Pupils are equal, round, and reactive to light.   Cardiovascular:      Rate and Rhythm: Normal rate and regular rhythm.      Heart sounds: No murmur heard.  Pulmonary:      Effort: Pulmonary effort is normal.      Breath sounds: Normal breath sounds. No wheezing.   Abdominal:      General: Abdomen is flat. Bowel sounds are normal.      Palpations: Abdomen is soft.      Hernia: No hernia is present.   Musculoskeletal:         General: Normal range of motion.      Cervical back: Normal range of motion and neck supple.      Right lower leg: No edema.      Left lower leg: No edema.   Lymphadenopathy:      Cervical: No cervical adenopathy.   Skin:     General: Skin is warm and dry.      Coloration: Skin is not jaundiced.      Findings: No lesion.   Neurological:      General: No focal deficit present.      Mental Status: He is alert and oriented to person, place, and time.      Cranial Nerves: No cranial nerve deficit.      Gait: Gait normal.   Psychiatric:         Mood and Affect: Mood normal.         Behavior: Behavior normal.         Judgment: Judgment normal.        Assessment and Plan (including Health Maintenance)      Problem List  Smart Sets  Document Outside HM   :    Plan:           Health Maintenance Due   Topic Date Due    HIV Screening  Never done    Colorectal Cancer Screening  Never done       Problem List Items Addressed This Visit          Orthopedic    Neck pain (Chronic)    Ankylosing spondylitis - Primary    Overview     Patient takes immunosuppression - recently changed to Cimzia, Taltz  Followed by Dr. Medel for this            Health Maintenance Topics with due status: Not Due       Topic Last Completion Date    TETANUS VACCINE 08/04/2020    Lipid Panel 08/24/2022       Future Appointments   Date Time Provider Department Center    5/19/2023  8:00 AM Darrin Ochoa, PT RFND REHAB [facility]   5/22/2023  8:15 AM New Osman MD [facility] FAMMED [facility]   5/31/2023  3:30 PM Anuradha Cruz MD [facility] RHEU [place] [facility]   9/7/2023  8:[facility]   9/7/2023 10:00 AM BURTON Dong [facility] UROL [facility]        There are no Patient Instructions on file for this visit.  Follow up in about 3 months (around 8/9/2023) for routine followup.     Signature:  New Osman MD      Date of encounter: 5/9/23

## 2023-05-25 ENCOUNTER — SPECIALTY PHARMACY (OUTPATIENT)
Dept: PHARMACY | Facility: CLINIC | Age: 43
End: 2023-05-25

## 2023-05-25 NOTE — TELEPHONE ENCOUNTER
Specialty Pharmacy - Refill Coordination    Specialty Medication Orders Linked to Encounter      Flowsheet Row Most Recent Value   Medication #1 ixekizumab (TALTZ AUTOINJECTOR) 80 mg/mL AtIn (Order#[id number], Rx#[id number])            Refill Questions - Documented Responses      Flowsheet Row Most Recent Value   Patient Availability and HIPAA Verification    Does patient want to proceed with activity? Yes   HIPAA/medical authority confirmed? Yes   Relationship to patient of person spoken to? Self   Refill Screening Questions    Changes to allergies? No   Changes to medications? No   New conditions since last clinic visit? No  [Pt stated his joints feel loose and like they can fall out of place. Doctor has been notified. He declined to speak with assigned Rph]   Unplanned office visit, urgent care, ED, or hospital admission in the last 4 weeks? No   How does patient/caregiver feel medication is working? Fair  [Pt stated his joints feel loose and like they can fall out of place. Doctor has been notified. He declined to speak with assigned Rph]   Financial problems or insurance changes? No   How many doses of your specialty medications were missed in the last 4 weeks? 0   Would patient like to speak to a pharmacist? No   When does the patient need to receive the medication? 05/26/23   Refill Delivery Questions    How will the patient receive the medication? Mail   When does the patient need to receive the medication? 05/26/23   Shipping Address Home   Address in [facility] confirmed and updated if neccessary? Yes   Expected Copay ($) 5   Is the patient able to afford the medication copay? Yes   Payment Method CC on file   Days supply of Refill 28   Supplies needed? No supplies needed   Refill activity completed? Yes   Refill activity plan Refill scheduled   Shipment/Pickup Date: 05/25/23            Current Outpatient Medications   Medication Sig    clonazePAM (KLONOPIN) 1 MG tablet Take 1 tablet (1 mg total) by  mouth 2 (two) times daily as needed for Anxiety.    cyanocobalamin 1,000 mcg/mL injection Inject 1 mL (1,000 mcg total) into the muscle every 30 days.    cyclobenzaprine (FLEXERIL) 10 MG tablet Take 1 tablet (10 mg total) by mouth every evening.    diclofenac (VOLTAREN) 75 MG EC tablet Take 75 mg by mouth 2 (two) times daily as needed.    ergocalciferol (ERGOCALCIFEROL) 50,000 unit Cap Take 1 capsule (50,000 Units total) by mouth every 7 days.    ixekizumab (TALTZ AUTOINJECTOR) 80 mg/mL AtIn Inject 1 mL (80 mg) into the skin every 4 weeks    pantoprazole (PROTONIX) 40 MG tablet Take 1 tablet (40 mg total) by mouth once daily.    pregabalin (LYRICA) 25 MG capsule Take 1 capsule (25 mg total) by mouth 3 (three) times daily.    sertraline (ZOLOFT) 100 MG tablet 1/2 tab po q AM x 7 days, the 1 tab po daily    tadalafiL (CIALIS) 20 MG Tab Take 1 tablet (20 mg total) by mouth once daily.    testosterone (TESTIM) 50 mg/5 gram (1 %) Gel Apply 2-3 drops topically once daily.   Last reviewed on 5/9/2023  4:25 PM by New Osman MD    Review of patient's allergies indicates:   Allergen Reactions    Pcn [penicillins] Hives and Swelling     Throat swelling    Sulfasalazine      Other reaction(s): Unknown    Gabapentin Nausea Only and Rash     Other reaction(s): Unknown    Last reviewed on  5/9/2023 4:25 PM by New Osman      Tasks added this encounter   No tasks added.   Tasks due within next 3 months   5/28/2023 - Refill Coordination Outreach (1 time occurrence)     Meagan Sexton, MillieD  [facility]  [address] [facility] [address]  Phone: [phone]  Fax: [phone]

## 2023-06-16 ENCOUNTER — SPECIALTY PHARMACY (OUTPATIENT)
Dept: PHARMACY | Facility: CLINIC | Age: 43
End: 2023-06-16

## 2023-06-16 NOTE — TELEPHONE ENCOUNTER
Specialty Pharmacy - Refill Coordination    Specialty Medication Orders Linked to Encounter      Flowsheet Row Most Recent Value   Medication #1 ixekizumab (TALTZ AUTOINJECTOR) 80 mg/mL AtIn (Order#[id number], Rx#[id number])            Refill Questions - Documented Responses      Flowsheet Row Most Recent Value   Patient Availability and HIPAA Verification    Does patient want to proceed with activity? Yes   HIPAA/medical authority confirmed? Yes   Relationship to patient of person spoken to? Self   Refill Screening Questions    Changes to allergies? No   Changes to medications? No   New conditions since last clinic visit? No   Unplanned office visit, urgent care, ED, or hospital admission in the last 4 weeks? No   How does patient/caregiver feel medication is working? Fair   Financial problems or insurance changes? No   How many doses of your specialty medications were missed in the last 4 weeks? 0   Would patient like to speak to a pharmacist? No   When does the patient need to receive the medication? 06/23/23   Refill Delivery Questions    How will the patient receive the medication? Mail   When does the patient need to receive the medication? 06/23/23   Shipping Address Home   Address in [facility] confirmed and updated if neccessary? Yes   Expected Copay ($) 5   Is the patient able to afford the medication copay? Yes   Payment Method CC on file   Days supply of Refill 28   Supplies needed? No supplies needed   Refill activity completed? Yes   Refill activity plan Refill scheduled   Shipment/Pickup Date: 06/21/23            Current Outpatient Medications   Medication Sig    clonazePAM (KLONOPIN) 1 MG tablet Take 1 tablet (1 mg total) by mouth 2 (two) times daily as needed for Anxiety.    cyanocobalamin 1,000 mcg/mL injection Inject 1 mL (1,000 mcg total) into the muscle every 30 days.    cyclobenzaprine (FLEXERIL) 10 MG tablet Take 1 tablet (10 mg total) by mouth every evening.    diclofenac (VOLTAREN) 75  MG EC tablet Take 75 mg by mouth 2 (two) times daily as needed.    ergocalciferol (ERGOCALCIFEROL) 50,000 unit Cap Take 1 capsule (50,000 Units total) by mouth every 7 days.    ixekizumab (TALTZ AUTOINJECTOR) 80 mg/mL AtIn Inject 1 mL (80 mg) into the skin every 4 weeks    pantoprazole (PROTONIX) 40 MG tablet Take 1 tablet (40 mg total) by mouth once daily.    pregabalin (LYRICA) 25 MG capsule Take 1 capsule (25 mg total) by mouth 3 (three) times daily.    sertraline (ZOLOFT) 100 MG tablet 1/2 tab po q AM x 7 days, the 1 tab po daily    tadalafiL (CIALIS) 20 MG Tab Take 1 tablet (20 mg total) by mouth once daily.    testosterone (TESTIM) 50 mg/5 gram (1 %) Gel Apply 2-3 drops topically once daily.   Last reviewed on 5/9/2023  4:25 PM by New Osman MD    Review of patient's allergies indicates:   Allergen Reactions    Pcn [penicillins] Hives and Swelling     Throat swelling    Sulfasalazine      Other reaction(s): Unknown    Gabapentin Nausea Only and Rash     Other reaction(s): Unknown    Last reviewed on  5/9/2023 4:25 PM by New Osman      Tasks added this encounter   No tasks added.   Tasks due within next 3 months   No tasks due.     Heather Villanueva, PharmD  [facility]  [address] [place] [address]  Phone: [phone]  Fax: [phone]

## 2023-07-12 ENCOUNTER — PATIENT MESSAGE (OUTPATIENT)
Dept: PHARMACY | Facility: CLINIC | Age: 43
End: 2023-07-12

## 2023-07-18 ENCOUNTER — SPECIALTY PHARMACY (OUTPATIENT)
Dept: PHARMACY | Facility: CLINIC | Age: 43
End: 2023-07-18

## 2023-07-18 NOTE — TELEPHONE ENCOUNTER
Specialty Pharmacy - Refill Coordination    Specialty Medication Orders Linked to Encounter      Flowsheet Row Most Recent Value   Medication #1 ixekizumab (TALTZ AUTOINJECTOR) 80 mg/mL AtIn (Order#[id number], Rx#[id number])            Refill Questions - Documented Responses      Flowsheet Row Most Recent Value   Patient Availability and HIPAA Verification    Does patient want to proceed with activity? Yes   HIPAA/medical authority confirmed? Yes   Relationship to patient of person spoken to? Self   Refill Screening Questions    Changes to allergies? No   Changes to medications? No   New conditions since last clinic visit? No   How does patient/caregiver feel medication is working? Fair   Financial problems or insurance changes? No   How many doses of your specialty medications were missed in the last 4 weeks? 0   Would patient like to speak to a pharmacist? No   When does the patient need to receive the medication? 07/21/23   Refill Delivery Questions    How will the patient receive the medication? Mail   When does the patient need to receive the medication? 07/21/23   Shipping Address Home   Address in [facility] confirmed and updated if neccessary? Yes   Expected Copay ($) 5   Is the patient able to afford the medication copay? Yes   Payment Method CC on file   Days supply of Refill 28   Supplies needed? No supplies needed   Refill activity completed? Yes   Refill activity plan Refill scheduled   Shipment/Pickup Date: 07/19/23            Current Outpatient Medications   Medication Sig    clonazePAM (KLONOPIN) 1 MG tablet Take 1 tablet (1 mg total) by mouth 2 (two) times daily as needed for Anxiety.    cyanocobalamin 1,000 mcg/mL injection Inject 1 mL (1,000 mcg total) into the muscle every 30 days.    cyclobenzaprine (FLEXERIL) 10 MG tablet Take 1 tablet (10 mg total) by mouth every evening.    diclofenac (VOLTAREN) 75 MG EC tablet Take 75 mg by mouth 2 (two) times daily as needed.    ergocalciferol  (ERGOCALCIFEROL) 50,000 unit Cap Take 1 capsule (50,000 Units total) by mouth every 7 days.    ixekizumab (TALTZ AUTOINJECTOR) 80 mg/mL AtIn Inject 1 mL (80 mg) into the skin every 4 weeks    pantoprazole (PROTONIX) 40 MG tablet Take 1 tablet (40 mg total) by mouth once daily.    pregabalin (LYRICA) 25 MG capsule Take 1 capsule (25 mg total) by mouth 3 (three) times daily.    sertraline (ZOLOFT) 100 MG tablet 1/2 tab po q AM x 7 days, the 1 tab po daily    tadalafiL (CIALIS) 20 MG Tab Take 1 tablet (20 mg total) by mouth once daily.    testosterone (TESTIM) 50 mg/5 gram (1 %) Gel Apply 2-3 drops topically once daily.   Last reviewed on 5/9/2023  4:25 PM by New Osman MD    Review of patient's allergies indicates:   Allergen Reactions    Pcn [penicillins] Hives and Swelling     Throat swelling    Sulfasalazine      Other reaction(s): Unknown    Gabapentin Nausea Only and Rash     Other reaction(s): Unknown    Last reviewed on  5/9/2023 4:25 PM by New Osman      Tasks added this encounter   No tasks added.   Tasks due within next 3 months   No tasks due.     Denny Green, PharmD  [facility]  [address] [place] [address]  Phone: [phone]  Fax: [phone]

## 2023-08-09 ENCOUNTER — SPECIALTY PHARMACY (OUTPATIENT)
Dept: PHARMACY | Facility: CLINIC | Age: 43
End: 2023-08-09

## 2023-08-09 NOTE — TELEPHONE ENCOUNTER
Specialty Pharmacy - Refill Coordination    Specialty Medication Orders Linked to Encounter      Flowsheet Row Most Recent Value   Medication #1 ixekizumab (TALTZ AUTOINJECTOR) 80 mg/mL AtIn (Order#[id number], Rx#[id number])            Refill Questions - Documented Responses      Flowsheet Row Most Recent Value   Patient Availability and HIPAA Verification    HIPAA/medical authority confirmed? Yes   Relationship to patient of person spoken to? Self   Refill Screening Questions    Changes to allergies? No   Changes to medications? No   New conditions since last clinic visit? No   Unplanned office visit, urgent care, ED, or hospital admission in the last 4 weeks? No   How does patient/caregiver feel medication is working? Good   Financial problems or insurance changes? No   How many doses of your specialty medications were missed in the last 4 weeks? 0   Would patient like to speak to a pharmacist? No   When does the patient need to receive the medication? 08/17/23   Refill Delivery Questions    How will the patient receive the medication? Mail   When does the patient need to receive the medication? 08/17/23   Shipping Address Home   Address in [facility] confirmed and updated if neccessary? Yes   Expected Copay ($) 5   Is the patient able to afford the medication copay? Yes   Payment Method CC on file   Days supply of Refill 28   Supplies needed? No supplies needed   Refill activity completed? Yes   Refill activity plan Refill scheduled   Shipment/Pickup Date: 08/14/23            Current Outpatient Medications   Medication Sig    clonazePAM (KLONOPIN) 1 MG tablet Take 1 tablet (1 mg total) by mouth 2 (two) times daily as needed for Anxiety.    cyanocobalamin 1,000 mcg/mL injection Inject 1 mL (1,000 mcg total) into the muscle every 30 days.    cyclobenzaprine (FLEXERIL) 10 MG tablet Take 1 tablet (10 mg total) by mouth every evening.    diclofenac (VOLTAREN) 75 MG EC tablet Take 75 mg by mouth 2 (two) times  daily as needed.    ergocalciferol (ERGOCALCIFEROL) 50,000 unit Cap Take 1 capsule (50,000 Units total) by mouth every 7 days.    ixekizumab (TALTZ AUTOINJECTOR) 80 mg/mL AtIn Inject 1 mL (80 mg) into the skin every 4 weeks    pantoprazole (PROTONIX) 40 MG tablet Take 1 tablet (40 mg total) by mouth once daily.    pregabalin (LYRICA) 25 MG capsule Take 1 capsule (25 mg total) by mouth 3 (three) times daily.    sertraline (ZOLOFT) 100 MG tablet 1/2 tab po q AM x 7 days, the 1 tab po daily    tadalafiL (CIALIS) 20 MG Tab Take 1 tablet (20 mg total) by mouth once daily.    testosterone (TESTIM) 50 mg/5 gram (1 %) Gel Apply 2-3 drops topically once daily.   Last reviewed on 5/9/2023  4:25 PM by New Osman MD    Review of patient's allergies indicates:   Allergen Reactions    Pcn [penicillins] Hives and Swelling     Throat swelling    Sulfasalazine      Other reaction(s): Unknown    Gabapentin Nausea Only and Rash     Other reaction(s): Unknown    Last reviewed on  5/9/2023 4:25 PM by New Osman      Tasks added this encounter   No tasks added.   Tasks due within next 3 months   11/8/2023 - Clinical Assessment (1 year recurrence)     Naz Miller  [facility]  [address] [place] [id number]  Phone: [phone]  Fax: [phone]      Specialty Pharmacy - Refill Coordination    Specialty Medication Orders Linked to Encounter      Flowsheet Row Most Recent Value   Medication #1 ixekizumab (TALTZ AUTOINJECTOR) 80 mg/mL AtIn (Order#[id number], Rx#[id number])            Refill Questions - Documented Responses      Flowsheet Row Most Recent Value   Patient Availability and HIPAA Verification    HIPAA/medical authority confirmed? Yes   Relationship to patient of person spoken to? Self   Refill Screening Questions    Changes to allergies? No   Changes to medications? No   New conditions since last clinic visit? No   Unplanned office visit, urgent care, ED, or hospital  admission in the last 4 weeks? No   How does patient/caregiver feel medication is working? Good   Financial problems or insurance changes? No   How many doses of your specialty medications were missed in the last 4 weeks? 0   Would patient like to speak to a pharmacist? No   When does the patient need to receive the medication? 08/17/23   Refill Delivery Questions    When does the patient need to receive the medication? 08/17/23   Shipping Address Home   Address in [facility] confirmed and updated if neccessary? Yes   Expected Copay ($) 5   Is the patient able to afford the medication copay? Yes   Payment Method zero copay, CC on file   Days supply of Refill 28   Supplies needed? No supplies needed   Refill activity completed? Yes   Refill activity plan Refill scheduled   Shipment/Pickup Date: 08/14/23            Current Outpatient Medications   Medication Sig    clonazePAM (KLONOPIN) 1 MG tablet Take 1 tablet (1 mg total) by mouth 2 (two) times daily as needed for Anxiety.    cyanocobalamin 1,000 mcg/mL injection Inject 1 mL (1,000 mcg total) into the muscle every 30 days.    cyclobenzaprine (FLEXERIL) 10 MG tablet Take 1 tablet (10 mg total) by mouth every evening.    diclofenac (VOLTAREN) 75 MG EC tablet Take 75 mg by mouth 2 (two) times daily as needed.    ergocalciferol (ERGOCALCIFEROL) 50,000 unit Cap Take 1 capsule (50,000 Units total) by mouth every 7 days.    ixekizumab (TALTZ AUTOINJECTOR) 80 mg/mL AtIn Inject 1 mL (80 mg) into the skin every 4 weeks    pantoprazole (PROTONIX) 40 MG tablet Take 1 tablet (40 mg total) by mouth once daily.    pregabalin (LYRICA) 25 MG capsule Take 1 capsule (25 mg total) by mouth 3 (three) times daily.    sertraline (ZOLOFT) 100 MG tablet 1/2 tab po q AM x 7 days, the 1 tab po daily    tadalafiL (CIALIS) 20 MG Tab Take 1 tablet (20 mg total) by mouth once daily.    testosterone (TESTIM) 50 mg/5 gram (1 %) Gel Apply 2-3 drops topically once daily.   Last reviewed on  5/9/2023  4:25 PM by New Osman MD    Review of patient's allergies indicates:   Allergen Reactions    Pcn [penicillins] Hives and Swelling     Throat swelling    Sulfasalazine      Other reaction(s): Unknown    Gabapentin Nausea Only and Rash     Other reaction(s): Unknown    Last reviewed on  5/9/2023 4:25 PM by New Osman      Tasks added this encounter   No tasks added.   Tasks due within next 3 months   11/8/2023 - Clinical Assessment (1 year recurrence)     Naz Reilly [facility]  [address] [place] [id number]  Phone: [phone]  Fax: [phone]

## 2023-08-21 ENCOUNTER — SPECIALTY PHARMACY (OUTPATIENT)
Dept: PHARMACY | Facility: CLINIC | Age: 43
End: 2023-08-21

## 2023-08-21 NOTE — TELEPHONE ENCOUNTER
Incoming call from patient's fiance requesting for [facility] to send them the invoices/receipts for the 2023 year as they were using the [employer] card and need to submit receipts. She is ok with [facility] sending when refilling the Taltz in September.

## 2023-09-06 NOTE — TELEPHONE ENCOUNTER
Patient requesting receipts of transaction on [employer] card to fill Taltz from Jan 2023 - Jun 2023. Attempted refill x2 with patient call dropped twice.

## 2023-09-06 NOTE — TELEPHONE ENCOUNTER
Specialty Pharmacy - Refill Coordination    Specialty Medication Orders Linked to Encounter      Flowsheet Row Most Recent Value   Medication #1 ixekizumab (TALTZ AUTOINJECTOR) 80 mg/mL AtIn (Order#[id number], Rx#[id number])            Refill Questions - Documented Responses      Flowsheet Row Most Recent Value   Patient Availability and HIPAA Verification    Does patient want to proceed with activity? Yes   HIPAA/medical authority confirmed? Yes   Relationship to patient of person spoken to? Self   Refill Screening Questions    Changes to allergies? No   Changes to medications? No   New conditions since last clinic visit? No   Unplanned office visit, urgent care, ED, or hospital admission in the last 4 weeks? No   How does patient/caregiver feel medication is working? Fair   How many doses of your specialty medications were missed in the last 4 weeks? 0   Would patient like to speak to a pharmacist? No   When does the patient need to receive the medication? 09/14/23   Refill Delivery Questions    How will the patient receive the medication? Mail   When does the patient need to receive the medication? 09/14/23   Shipping Address Home   Address in [facility] confirmed and updated if neccessary? Yes   Expected Copay ($) 5   Is the patient able to afford the medication copay? Yes   Payment Method new CC added to file   Days supply of Refill 28   Supplies needed? No supplies needed   Refill activity completed? Yes   Refill activity plan Refill scheduled   Shipment/Pickup Date: 09/07/23            Current Outpatient Medications   Medication Sig    clonazePAM (KLONOPIN) 1 MG tablet Take 1 tablet (1 mg total) by mouth 2 (two) times daily as needed for Anxiety.    cyanocobalamin 1,000 mcg/mL injection Inject 1 mL (1,000 mcg total) into the muscle every 30 days.    cyclobenzaprine (FLEXERIL) 10 MG tablet Take 1 tablet (10 mg total) by mouth every evening.    diclofenac (VOLTAREN) 75 MG EC tablet Take 75 mg by mouth 2  (two) times daily as needed.    ergocalciferol (ERGOCALCIFEROL) 50,000 unit Cap Take 1 capsule (50,000 Units total) by mouth every 7 days.    ixekizumab (TALTZ AUTOINJECTOR) 80 mg/mL AtIn Inject 1 mL (80 mg) into the skin every 4 weeks    pantoprazole (PROTONIX) 40 MG tablet Take 1 tablet (40 mg total) by mouth once daily.    pregabalin (LYRICA) 25 MG capsule Take 1 capsule (25 mg total) by mouth 3 (three) times daily.    sertraline (ZOLOFT) 100 MG tablet 1/2 tab po q AM x 7 days, the 1 tab po daily    tadalafiL (CIALIS) 20 MG Tab Take 1 tablet (20 mg total) by mouth once daily.    testosterone (TESTIM) 50 mg/5 gram (1 %) Gel Apply 2-3 drops topically once daily.   Last reviewed on 5/9/2023  4:25 PM by New Osman MD    Review of patient's allergies indicates:   Allergen Reactions    Pcn [penicillins] Hives and Swelling     Throat swelling    Sulfasalazine      Other reaction(s): Unknown    Gabapentin Nausea Only and Rash     Other reaction(s): Unknown    Last reviewed on  5/9/2023 4:25 PM by New Osman      Tasks added this encounter   No tasks added.   Tasks due within next 3 months   11/8/2023 - Clinical Assessment (1 year recurrence)     Jaye Javier, PharmD  [facility]  [address] [place] [id number]  Phone: [phone]  Fax: [phone]

## 2023-10-23 ENCOUNTER — OFFICE VISIT (OUTPATIENT)
Dept: FAMILY MEDICINE | Facility: CLINIC | Age: 43
End: 2023-10-23
Payer: COMMERCIAL

## 2023-10-23 VITALS
HEART RATE: 74 BPM | BODY MASS INDEX: 22.94 KG/M2 | DIASTOLIC BLOOD PRESSURE: 72 MMHG | WEIGHT: 169.38 LBS | RESPIRATION RATE: 20 BRPM | SYSTOLIC BLOOD PRESSURE: 122 MMHG | HEIGHT: 72 IN | OXYGEN SATURATION: 98 %

## 2023-10-23 DIAGNOSIS — G56.03 BILATERAL CARPAL TUNNEL SYNDROME: Primary | ICD-10-CM

## 2023-10-23 DIAGNOSIS — F32.A DEPRESSION, UNSPECIFIED DEPRESSION TYPE: ICD-10-CM

## 2023-10-23 DIAGNOSIS — M45.9 ANKYLOSING SPONDYLITIS, UNSPECIFIED SITE OF SPINE: ICD-10-CM

## 2023-10-23 DIAGNOSIS — N52.9 ERECTILE DYSFUNCTION, UNSPECIFIED ERECTILE DYSFUNCTION TYPE: ICD-10-CM

## 2023-10-23 DIAGNOSIS — Z23 NEED FOR VACCINATION: ICD-10-CM

## 2023-10-23 DIAGNOSIS — E29.1 TESTOSTERONE DEFICIENCY IN MALE: ICD-10-CM

## 2023-10-23 PROCEDURE — 90686 FLU VACCINE (QUAD) GREATER THAN OR EQUAL TO 3YO PRESERVATIVE FREE IM: ICD-10-PCS | Mod: ,,, | Performed by: FAMILY MEDICINE

## 2023-10-23 PROCEDURE — 3074F PR MOST RECENT SYSTOLIC BLOOD PRESSURE < 130 MM HG: ICD-10-PCS | Mod: ,,, | Performed by: FAMILY MEDICINE

## 2023-10-23 PROCEDURE — 90471 IMMUNIZATION ADMIN: CPT | Mod: ,,, | Performed by: FAMILY MEDICINE

## 2023-10-23 PROCEDURE — 1160F PR REVIEW ALL MEDS BY PRESCRIBER/CLIN PHARMACIST DOCUMENTED: ICD-10-PCS | Mod: ,,, | Performed by: FAMILY MEDICINE

## 2023-10-23 PROCEDURE — 3008F BODY MASS INDEX DOCD: CPT | Mod: ,,, | Performed by: FAMILY MEDICINE

## 2023-10-23 PROCEDURE — 1160F RVW MEDS BY RX/DR IN RCRD: CPT | Mod: ,,, | Performed by: FAMILY MEDICINE

## 2023-10-23 PROCEDURE — 3078F PR MOST RECENT DIASTOLIC BLOOD PRESSURE < 80 MM HG: ICD-10-PCS | Mod: ,,, | Performed by: FAMILY MEDICINE

## 2023-10-23 PROCEDURE — 99214 PR OFFICE/OUTPT VISIT, EST, LEVL IV, 30-39 MIN: ICD-10-PCS | Mod: 25,,, | Performed by: FAMILY MEDICINE

## 2023-10-23 PROCEDURE — 90471 FLU VACCINE (QUAD) GREATER THAN OR EQUAL TO 3YO PRESERVATIVE FREE IM: ICD-10-PCS | Mod: ,,, | Performed by: FAMILY MEDICINE

## 2023-10-23 PROCEDURE — 3074F SYST BP LT 130 MM HG: CPT | Mod: ,,, | Performed by: FAMILY MEDICINE

## 2023-10-23 PROCEDURE — 3008F PR BODY MASS INDEX (BMI) DOCUMENTED: ICD-10-PCS | Mod: ,,, | Performed by: FAMILY MEDICINE

## 2023-10-23 PROCEDURE — 90686 IIV4 VACC NO PRSV 0.5 ML IM: CPT | Mod: ,,, | Performed by: FAMILY MEDICINE

## 2023-10-23 PROCEDURE — 1159F PR MEDICATION LIST DOCUMENTED IN MEDICAL RECORD: ICD-10-PCS | Mod: ,,, | Performed by: FAMILY MEDICINE

## 2023-10-23 PROCEDURE — 1159F MED LIST DOCD IN RCRD: CPT | Mod: ,,, | Performed by: FAMILY MEDICINE

## 2023-10-23 PROCEDURE — 99214 OFFICE O/P EST MOD 30 MIN: CPT | Mod: 25,,, | Performed by: FAMILY MEDICINE

## 2023-10-23 PROCEDURE — 3078F DIAST BP <80 MM HG: CPT | Mod: ,,, | Performed by: FAMILY MEDICINE

## 2023-10-23 RX ORDER — CYCLOBENZAPRINE HCL 10 MG
10 TABLET ORAL NIGHTLY
Qty: 30 TABLET | Refills: 11 | Status: SHIPPED | OUTPATIENT
Start: 2023-10-23

## 2023-10-23 RX ORDER — TADALAFIL 20 MG/1
20 TABLET ORAL DAILY
Qty: 10 TABLET | Refills: 11 | Status: SHIPPED | OUTPATIENT
Start: 2023-10-23 | End: 2024-10-22

## 2023-10-23 RX ORDER — PANTOPRAZOLE SODIUM 40 MG/1
40 TABLET, DELAYED RELEASE ORAL DAILY
Qty: 90 TABLET | Refills: 3 | Status: SHIPPED | OUTPATIENT
Start: 2023-10-23

## 2023-10-23 RX ORDER — SERTRALINE HYDROCHLORIDE 100 MG/1
TABLET, FILM COATED ORAL
Qty: 90 TABLET | Refills: 3 | Status: SHIPPED | OUTPATIENT
Start: 2023-10-23

## 2023-10-23 RX ORDER — DICLOFENAC SODIUM 75 MG/1
75 TABLET, DELAYED RELEASE ORAL 2 TIMES DAILY PRN
Qty: 90 TABLET | Refills: 3 | Status: SHIPPED | OUTPATIENT
Start: 2023-10-23

## 2023-10-23 RX ORDER — TESTOSTERONE 50 MG/5G
2-3 GEL TRANSDERMAL DAILY
Qty: 2 EACH | Refills: 2 | Status: SHIPPED | OUTPATIENT
Start: 2023-10-23 | End: 2024-04-22

## 2023-10-23 NOTE — PROGRESS NOTES
New Osman MD        PATIENT NAME: Alda Gamboa  : 1980  DATE: 10/23/23  MRN: [id number]      Billing Provider: New Osman MD  Level of Service: NV OFFICE/OUTPT VISIT, EST, LEVL IV, 30-39 MIN  Patient PCP Information       Provider PCP Type    New Osman MD General            Reason for Visit / Chief Complaint: Gastroesophageal Reflux (Check up for refill)       Update PCP  Update Chief Complaint         History of Present Illness / Problem Focused Workflow     Alda Gamboa presents to the clinic with Gastroesophageal Reflux (Check up for refill)     Pain at base of L thumb joint at base.  L shoulder pain.      Gastroesophageal Reflux  He reports no abdominal pain, no chest pain, no coughing, no nausea, no sore throat or no wheezing.       Review of Systems     Review of Systems   Constitutional:  Negative for activity change, appetite change, fever and unexpected weight change.   HENT:  Negative for congestion, rhinorrhea, sinus pressure, sinus pain, sore throat and trouble swallowing.    Eyes:  Negative for photophobia, pain, discharge and visual disturbance.   Respiratory:  Negative for cough, chest tightness, wheezing and stridor.    Cardiovascular:  Negative for chest pain, palpitations and leg swelling.   Gastrointestinal:  Negative for abdominal pain, blood in stool, constipation, diarrhea and nausea.   Endocrine: Negative for polydipsia, polyphagia and polyuria.   Genitourinary:  Negative for difficulty urinating, flank pain and hematuria.   Musculoskeletal:  Positive for arthralgias. Negative for neck pain.   Skin:  Negative for rash.   Allergic/Immunologic: Negative for food allergies.   Neurological:  Positive for numbness. Negative for dizziness, tremors, seizures, syncope, weakness (global weakness) and headaches.   Psychiatric/Behavioral:  Negative for behavioral problems, confusion, decreased concentration, dysphoric mood and hallucinations. The patient is not  nervous/anxious.         Medical / Social / Family History     Past Medical History:   Diagnosis Date    Acute bacterial prostatitis 5/31/2022    Positive Cx for UTI with scant hematuria Treated with 10 day course of Bactrim DS, completed yesterday    Ankylosing spondylitis 5/31/2022    Patient takes immunosuppression - recently changed to Cimzia, Taltz Followed by Dr. Medel for this    Back pain     Elevated prostate specific antigen (PSA) 8/12/2022    Lab Results Component Value Date  PSA 7.600 (H) 08/10/2022  PSA 8.580 (H) 07/15/2022 s/p month of antx for tx of acute exacerbation of chronic prostatitis    Neck pain     Spondylosis        Past Surgical History:   Procedure Laterality Date    CYST REMOVAL Right     shoulder    HERNIA REPAIR      LUMBAR DISCECTOMY  02/2018    L 4 L5    TONSILLECTOMY         Social History    reports that he has quit smoking. He has been exposed to tobacco smoke. He has never used smokeless tobacco. He reports that he does not currently use alcohol. He reports that he does not currently use drugs.    Family History  's family history includes Cancer in his maternal grandfather; Crohn's disease in his maternal cousin; Lung cancer in his father; Pacemaker/defibrilator in his mother.    Medications and Allergies     Medications  No outpatient medications have been marked as taking for the 10/23/23 encounter (Office Visit) with New Osman MD.       Allergies  Review of patient's allergies indicates:   Allergen Reactions    Pcn [penicillins] Hives and Swelling     Throat swelling    Sulfasalazine      Other reaction(s): Unknown    Gabapentin Nausea Only and Rash     Other reaction(s): Unknown       Physical Examination     Vitals:    10/23/23 0812   BP: 122/72   Pulse: 74   Resp: 20     Physical Exam  Constitutional:       General: He is not in acute distress.     Appearance: Normal appearance.   HENT:      Head: Normocephalic.      Right Ear: Tympanic membrane and ear canal  normal.      Left Ear: Tympanic membrane and ear canal normal.      Nose: Nose normal.      Mouth/Throat:      Mouth: Mucous membranes are moist.      Pharynx: No oropharyngeal exudate.   Eyes:      Extraocular Movements: Extraocular movements intact.      Pupils: Pupils are equal, round, and reactive to light.   Cardiovascular:      Rate and Rhythm: Normal rate and regular rhythm.      Heart sounds: No murmur heard.  Pulmonary:      Effort: Pulmonary effort is normal.      Breath sounds: Normal breath sounds. No wheezing.   Abdominal:      General: Abdomen is flat. Bowel sounds are normal.      Palpations: Abdomen is soft.      Hernia: No hernia is present.   Musculoskeletal:         General: Swelling and tenderness present. Normal range of motion.      Cervical back: Normal range of motion and neck supple.      Right lower leg: No edema.      Left lower leg: No edema.   Lymphadenopathy:      Cervical: No cervical adenopathy.   Skin:     General: Skin is warm and dry.      Coloration: Skin is not jaundiced.      Findings: No lesion.   Neurological:      General: No focal deficit present.      Mental Status: He is alert and oriented to person, place, and time.      Cranial Nerves: No cranial nerve deficit.      Gait: Gait normal.   Psychiatric:         Mood and Affect: Mood normal.         Behavior: Behavior normal.         Judgment: Judgment normal.          Assessment and Plan (including Health Maintenance)      Problem List  Smart Sets  Document Outside HM   :    Plan:     1. Need for vaccination    -     Influenza - Quadrivalent (PF)    2. Ankylosing spondylitis, unspecified site of spine  The current medical regimen is effective;  continue present plan and medications.  Overview:  Patient takes immunosuppression - recently changed to Cimzia, Taltz  Followed by Dr. Medel for this      3. Depression, unspecified depression type  The current medical regimen is effective;  continue present plan and  medications.    4. Erectile dysfunction, unspecified erectile dysfunction type  The current medical regimen is effective;  continue present plan and medications.    5. Testosterone deficiency in male  The current medical regimen is effective;  continue present plan and medications.  Overview:  Patient off TRT for over 6 mos now.  C/o signficant fatigue  labido improved somewhat with antx for ABP, but impotency resolved            Health Maintenance Due   Topic Date Due    HIV Screening  Never done    Colorectal Cancer Screening  Never done    COVID-19 Vaccine (4 - 2023-24 season) 09/01/2023       Problem List Items Addressed This Visit          Endocrine    Testosterone deficiency in male    Overview     Patient off TRT for over 6 mos now.  C/o signficant fatigue  labido improved somewhat with antx for ABP, but impotency resolved         Relevant Medications    testosterone (TESTIM) 50 mg/5 gram (1 %) Gel       Orthopedic    Ankylosing spondylitis    Overview     Patient takes immunosuppression - recently changed to Cimzia, Taltz  Followed by Dr. Medel for this         Relevant Medications    cyclobenzaprine (FLEXERIL) 10 MG tablet     Other Visit Diagnoses       Bilateral carpal tunnel syndrome    -  Primary    Relevant Orders    Ambulatory referral/consult to Orthopedics    Nerve conduction test    Need for vaccination        Relevant Orders    Influenza - Quadrivalent (PF) (Completed)    Depression, unspecified depression type        Relevant Medications    sertraline (ZOLOFT) 100 MG tablet    Erectile dysfunction, unspecified erectile dysfunction type        Relevant Medications    tadalafiL (CIALIS) 20 MG Tab            Health Maintenance Topics with due status: Not Due       Topic Last Completion Date    TETANUS VACCINE 08/04/2020    Lipid Panel 08/24/2022       Future Appointments   Date Time Provider Department Center   12/27/2023 10:20 AM [facility]    Will get NCS and  refer to Dr. Pelayo    There are no Patient Instructions on file for this visit.  Follow up if symptoms worsen or fail to improve.     Signature:  New Osman MD      Date of encounter: 10/23/23

## 2023-10-24 ENCOUNTER — PATIENT OUTREACH (OUTPATIENT)
Dept: ADMINISTRATIVE | Facility: HOSPITAL | Age: 43
End: 2023-10-24

## 2023-10-24 NOTE — PROGRESS NOTES
Per [employer] website, insurance is active and pt is listed on the attributed list needing a healthy you performed in 2023  HY scheduled for 12/27/2023

## 2023-10-30 DIAGNOSIS — M45.9 ANKYLOSING SPONDYLITIS, UNSPECIFIED SITE OF SPINE: ICD-10-CM

## 2023-10-30 RX ORDER — IXEKIZUMAB 80 MG/ML
80 INJECTION, SOLUTION SUBCUTANEOUS
Qty: 1 ML | Refills: 6 | Status: CANCELLED | OUTPATIENT
Start: 2023-10-30

## 2023-11-07 DIAGNOSIS — M45.9 ANKYLOSING SPONDYLITIS, UNSPECIFIED SITE OF SPINE: ICD-10-CM

## 2023-11-07 NOTE — TELEPHONE ENCOUNTER
----- Message from Debi Godoy sent at 11/7/2023 11:33 AM CST -----  Regarding: REFILL REQUEST FOR TALTZ  Please sent electronically to [facility], patient is pasted due injection date, this was sent on 10/30.      Thank you  Debi Jacobs [facility]  Pharmacy Advocate  [phone]

## 2023-11-08 RX ORDER — IXEKIZUMAB 80 MG/ML
80 INJECTION, SOLUTION SUBCUTANEOUS
Qty: 1 ML | Refills: 6 | Status: SHIPPED | OUTPATIENT
Start: 2023-11-08 | End: 2023-11-09 | Stop reason: SDUPTHER

## 2023-11-09 ENCOUNTER — TELEPHONE (OUTPATIENT)
Dept: RHEUMATOLOGY | Facility: CLINIC | Age: 43
End: 2023-11-09
Payer: COMMERCIAL

## 2023-11-09 DIAGNOSIS — M45.9 ANKYLOSING SPONDYLITIS, UNSPECIFIED SITE OF SPINE: ICD-10-CM

## 2023-11-09 RX ORDER — IXEKIZUMAB 80 MG/ML
80 INJECTION, SOLUTION SUBCUTANEOUS
Qty: 1 ML | Refills: 6 | Status: ACTIVE | OUTPATIENT
Start: 2023-11-09 | End: 2024-05-07

## 2023-11-09 NOTE — TELEPHONE ENCOUNTER
----- Message from Nunu De La Cruz PharmCLOTILDE sent at 11/9/2023 10:23 AM CST -----  Regarding: Taltz refill  Good morning Claire,    May you please resend the Taltz refill for Mr Gamboa as electronic to [facility]? It was refilled as print by Dr. Cruz. If you have any questions please let me know.     Kind regards,    Nunu De La Cruz, PharmD  Clinical Pharmacist     [facility]   [email or web address]  (O)[phone] (F) [phone]

## 2023-11-13 DIAGNOSIS — Z13.220 SCREENING FOR LIPOID DISORDERS: ICD-10-CM

## 2023-11-13 DIAGNOSIS — Z13.1 SCREENING FOR DIABETES MELLITUS: Primary | ICD-10-CM

## 2023-11-20 DIAGNOSIS — M25.512 ACUTE PAIN OF BOTH SHOULDERS: ICD-10-CM

## 2023-11-20 DIAGNOSIS — M79.642 PAIN IN BOTH HANDS: Primary | ICD-10-CM

## 2023-11-20 DIAGNOSIS — M25.511 ACUTE PAIN OF BOTH SHOULDERS: ICD-10-CM

## 2023-11-20 DIAGNOSIS — M79.641 PAIN IN BOTH HANDS: Primary | ICD-10-CM

## 2023-11-21 ENCOUNTER — HOSPITAL ENCOUNTER (OUTPATIENT)
Dept: RADIOLOGY | Facility: HOSPITAL | Age: 43
Discharge: HOME | End: 2023-11-21
Attending: ORTHOPAEDIC SURGERY
Payer: COMMERCIAL

## 2023-11-21 ENCOUNTER — OFFICE VISIT (OUTPATIENT)
Dept: ORTHOPEDICS | Facility: CLINIC | Age: 43
End: 2023-11-21
Payer: COMMERCIAL

## 2023-11-21 DIAGNOSIS — M79.641 PAIN IN BOTH HANDS: Primary | ICD-10-CM

## 2023-11-21 DIAGNOSIS — M25.511 ACUTE PAIN OF BOTH SHOULDERS: ICD-10-CM

## 2023-11-21 DIAGNOSIS — M79.641 PAIN IN BOTH HANDS: ICD-10-CM

## 2023-11-21 DIAGNOSIS — M25.512 ACUTE PAIN OF BOTH SHOULDERS: ICD-10-CM

## 2023-11-21 DIAGNOSIS — M79.642 PAIN IN BOTH HANDS: Primary | ICD-10-CM

## 2023-11-21 DIAGNOSIS — M79.642 PAIN IN BOTH HANDS: ICD-10-CM

## 2023-11-21 PROCEDURE — 99203 OFFICE O/P NEW LOW 30 MIN: CPT | Mod: S$PBB,,, | Performed by: ORTHOPAEDIC SURGERY

## 2023-11-21 PROCEDURE — 1159F MED LIST DOCD IN RCRD: CPT | Mod: ,,, | Performed by: ORTHOPAEDIC SURGERY

## 2023-11-21 PROCEDURE — 73130 X-RAY EXAM OF HAND: CPT | Mod: 26,50,, | Performed by: ORTHOPAEDIC SURGERY

## 2023-11-21 PROCEDURE — 73030 X-RAY EXAM OF SHOULDER: CPT | Mod: 26,50,, | Performed by: ORTHOPAEDIC SURGERY

## 2023-11-21 PROCEDURE — 99203 PR OFFICE/OUTPT VISIT, NEW, LEVL III, 30-44 MIN: ICD-10-PCS | Mod: S$PBB,,, | Performed by: ORTHOPAEDIC SURGERY

## 2023-11-21 PROCEDURE — 1159F PR MEDICATION LIST DOCUMENTED IN MEDICAL RECORD: ICD-10-PCS | Mod: ,,, | Performed by: ORTHOPAEDIC SURGERY

## 2023-11-21 PROCEDURE — 73030 X-RAY EXAM OF SHOULDER: CPT | Mod: TC,50

## 2023-11-21 PROCEDURE — 73130 XR HAND COMPLETE 3 VIEWS BILATERAL: ICD-10-PCS | Mod: 26,50,, | Performed by: ORTHOPAEDIC SURGERY

## 2023-11-21 PROCEDURE — 99213 OFFICE O/P EST LOW 20 MIN: CPT | Mod: PBBFAC | Performed by: ORTHOPAEDIC SURGERY

## 2023-11-21 PROCEDURE — 73130 X-RAY EXAM OF HAND: CPT | Mod: TC,50

## 2023-11-21 PROCEDURE — 73030 XR SHOULDER COMPLETE 2 OR MORE VIEWS BILATERAL: ICD-10-PCS | Mod: 26,50,, | Performed by: ORTHOPAEDIC SURGERY

## 2023-11-21 NOTE — H&P (VIEW-ONLY)
CC:   Chief Complaint   Patient presents with    Right Shoulder - Pain    Left Shoulder - Pain    Right Hand - Pain    Left Hand - Pain        PREVIOUS INFO:        HISTORY:   11/21/2023    Alda Gamboa  is a 43 y.o. patient has underlying ankylosing spondylitis he is followed by Rheumatology here in addition to that he is seen [facility] on several occasions he is had back surgery by Dr. Iverson over in [place] and he is had procedures previously by Dr. Jj Gonzalez he is currently not followed in the Pain Clinic currently  He is having left greater than right shoulder pain but his major complaint is bilateral hand numbness and tingling at night wakes up shaking his hands he is had EMG is previously years ago showing carpal tunnel but never had surgery    Patient is on taltz and Voltaren and Flexeril      PAST MEDICAL HISTORY:   Past Medical History:   Diagnosis Date    Acute bacterial prostatitis 5/31/2022    Positive Cx for UTI with scant hematuria Treated with 10 day course of Bactrim DS, completed yesterday    Ankylosing spondylitis 5/31/2022    Patient takes immunosuppression - recently changed to Cimzia, Taltz Followed by Dr. Medel for this    Back pain     Elevated prostate specific antigen (PSA) 8/12/2022    Lab Results Component Value Date  PSA 7.600 (H) 08/10/2022  PSA 8.580 (H) 07/15/2022 s/p month of antx for tx of acute exacerbation of chronic prostatitis    Neck pain     Spondylosis           PAST SURGICAL HISTORY:   Past Surgical History:   Procedure Laterality Date    CYST REMOVAL Right     shoulder    HERNIA REPAIR      LUMBAR DISCECTOMY  02/2018    L 4 L5    TONSILLECTOMY            ALLERGIES:   Review of patient's allergies indicates:   Allergen Reactions    Pcn [penicillins] Hives and Swelling     Throat swelling    Sulfasalazine      Other reaction(s): Unknown    Gabapentin Nausea Only and Rash     Other reaction(s): Unknown        MEDICATIONS :    Current Outpatient  Medications:     clonazePAM (KLONOPIN) 1 MG tablet, Take 1 tablet (1 mg total) by mouth 2 (two) times daily as needed for Anxiety., Disp: 30 tablet, Rfl: 2    cyanocobalamin 1,000 mcg/mL injection, Inject 1 mL (1,000 mcg total) into the muscle every 30 days., Disp: 1 mL, Rfl: 11    cyclobenzaprine (FLEXERIL) 10 MG tablet, Take 1 tablet (10 mg total) by mouth every evening., Disp: 30 tablet, Rfl: 11    diclofenac (VOLTAREN) 75 MG EC tablet, Take 1 tablet (75 mg total) by mouth 2 (two) times daily as needed (as needed)., Disp: 90 tablet, Rfl: 3    ergocalciferol (ERGOCALCIFEROL) 50,000 unit Cap, Take 1 capsule (50,000 Units total) by mouth every 7 days., Disp: 4 capsule, Rfl: 2    ixekizumab (TALTZ AUTOINJECTOR) 80 mg/mL AtIn, Inject 80 mg into the skin every 28 days., Disp: 1 mL, Rfl: 6    pantoprazole (PROTONIX) 40 MG tablet, Take 1 tablet (40 mg total) by mouth once daily., Disp: 90 tablet, Rfl: 3    pregabalin (LYRICA) 25 MG capsule, Take 1 capsule (25 mg total) by mouth 3 (three) times daily., Disp: 90 capsule, Rfl: 6    sertraline (ZOLOFT) 100 MG tablet, 1/2 tab po q AM x 7 days, the 1 tab po daily, Disp: 90 tablet, Rfl: 3    tadalafiL (CIALIS) 20 MG Tab, Take 1 tablet (20 mg total) by mouth once daily., Disp: 10 tablet, Rfl: 11    testosterone (TESTIM) 50 mg/5 gram (1 %) Gel, Apply 2-3 drops topically once daily., Disp: 2 each, Rfl: 2     SOCIAL HISTORY:   Social History     Socioeconomic History    Marital status: Single   Tobacco Use    Smoking status: Former     Passive exposure: Past    Smokeless tobacco: Never    Tobacco comments:     quit smoking at age 26, started at 12   Substance and Sexual Activity    Alcohol use: Not Currently    Drug use: Not Currently    Sexual activity: Yes   Social History Narrative    ** Merged History Encounter **             ROS    FAMILY HISTORY:   Family History   Problem Relation Age of Onset    Pacemaker/defibrilator Mother     Lung cancer Father     Cancer Maternal  Grandfather     Crohn's disease Maternal Cousin           PHYSICAL EXAM: There were no vitals filed for this visit.            There is no height or weight on file to calculate BMI.     In general, this is a well-developed, well-nourished male . The patient is alert, oriented and cooperative.      HEENT:  Normocephalic, atraumatic.  Extraocular movements are intact bilaterally.  The oropharynx is benign.       NECK:  Nontender with good range of motion.      PULMONARY: Respirations are even and non-labored.       CARDIOVASCULAR: Pulses regular by peripheral palpation.       ABDOMEN:  Soft, non-tender, non-distended.        EXTREMITIES:  Positive Phalen's test today mild pain with left shoulder pain no pain with motion his right shoulder.    Ortho Exam      RADIOGRAPHIC FINDINGS:  Left hand AP lateral oblique views normal carpal alignment the degenerative changes at the 1st metacarpal carpal joint no fracture dislocation appreciated    Right hand AP lateral oblique views normal carpal alignment there is normal bone mineralization there are degenerative changes noted of the 1st metacarpal carpal joint no fracture dislocation appreciated    Right shoulder AP and transscapular lateral view the joints congruent reduced there is some narrowing of the glenohumeral joint no fracture dislocation seen early degenerative changes noted    Left shoulder AP and transscapular lateral views there is narrowing of the glenohumeral joint joints congruent congruent reduced no fracture dislocation appreciated early degenerative changes      .  EMG is January 6, 2020   right carpal tunnel affecting both motor and sensory components  Left carpal tunnel affecting sensory component  Read by Dr. Naman Hagen    Addendum November 27, 2023    EMG is [facility] dated November 22, 2023  Bilateral moderate carpal tunnel left greater than right by symptoms no evidence of acute cervical radiculopathy  No electrical physiological evidence of  brachial plexus injury ulnar nerve entrapment or large fiber neuropathy bilateral upper extremities      IMPRESSION:  Scheduled for EMG is tomorrow over at [facility] asked to send us a copy    PLAN:  Await EMG is probable carpal tunnel releases surgery was discussed with him risks benefits etc.      Assessment   bilateral carpal tunnel      Plan   left carpal tunnel release           I had a long discussion with the patient about treatment options, including operative and nonoperative treatments. We discussed pros and cons of each including risks pertinent to surgery including pain, infection, bleeding, damage to adjacent structures like nerves and blood vessels, failure to heal, need for future surgeries, stiffness, instability, loss of limb, anesthesia risks like stroke, blood clot, loss of life. We discussed the possibility of need for later hardware removal in the case that hardware was used. We discussed common and uncommon risks, and discussed patient specific factors that may increase the risks present with surgery. All questions were answered. The patient expressed understanding of the pros and cons of surgery and wanted to proceed with surgical treatment.       No follow-ups on file.         Jason Pelayo III      (Subject to voice recognition error, transcription service not allowed)

## 2023-11-21 NOTE — PROGRESS NOTES
CC:   Chief Complaint   Patient presents with    Right Shoulder - Pain    Left Shoulder - Pain    Right Hand - Pain    Left Hand - Pain        PREVIOUS INFO:        HISTORY:   11/21/2023    Alda Gamboa  is a 43 y.o. patient has underlying ankylosing spondylitis he is followed by Rheumatology here in addition to that he is seen [facility] on several occasions he is had back surgery by Dr. Iverson over in [place] and he is had procedures previously by Dr. Jj Gonzalez he is currently not followed in the Pain Clinic currently  He is having left greater than right shoulder pain but his major complaint is bilateral hand numbness and tingling at night wakes up shaking his hands he is had EMG is previously years ago showing carpal tunnel but never had surgery    Patient is on taltz and Voltaren and Flexeril      PAST MEDICAL HISTORY:   Past Medical History:   Diagnosis Date    Acute bacterial prostatitis 5/31/2022    Positive Cx for UTI with scant hematuria Treated with 10 day course of Bactrim DS, completed yesterday    Ankylosing spondylitis 5/31/2022    Patient takes immunosuppression - recently changed to Cimzia, Taltz Followed by Dr. Medel for this    Back pain     Elevated prostate specific antigen (PSA) 8/12/2022    Lab Results Component Value Date  PSA 7.600 (H) 08/10/2022  PSA 8.580 (H) 07/15/2022 s/p month of antx for tx of acute exacerbation of chronic prostatitis    Neck pain     Spondylosis           PAST SURGICAL HISTORY:   Past Surgical History:   Procedure Laterality Date    CYST REMOVAL Right     shoulder    HERNIA REPAIR      LUMBAR DISCECTOMY  02/2018    L 4 L5    TONSILLECTOMY            ALLERGIES:   Review of patient's allergies indicates:   Allergen Reactions    Pcn [penicillins] Hives and Swelling     Throat swelling    Sulfasalazine      Other reaction(s): Unknown    Gabapentin Nausea Only and Rash     Other reaction(s): Unknown        MEDICATIONS :    Current Outpatient  Medications:     clonazePAM (KLONOPIN) 1 MG tablet, Take 1 tablet (1 mg total) by mouth 2 (two) times daily as needed for Anxiety., Disp: 30 tablet, Rfl: 2    cyanocobalamin 1,000 mcg/mL injection, Inject 1 mL (1,000 mcg total) into the muscle every 30 days., Disp: 1 mL, Rfl: 11    cyclobenzaprine (FLEXERIL) 10 MG tablet, Take 1 tablet (10 mg total) by mouth every evening., Disp: 30 tablet, Rfl: 11    diclofenac (VOLTAREN) 75 MG EC tablet, Take 1 tablet (75 mg total) by mouth 2 (two) times daily as needed (as needed)., Disp: 90 tablet, Rfl: 3    ergocalciferol (ERGOCALCIFEROL) 50,000 unit Cap, Take 1 capsule (50,000 Units total) by mouth every 7 days., Disp: 4 capsule, Rfl: 2    ixekizumab (TALTZ AUTOINJECTOR) 80 mg/mL AtIn, Inject 80 mg into the skin every 28 days., Disp: 1 mL, Rfl: 6    pantoprazole (PROTONIX) 40 MG tablet, Take 1 tablet (40 mg total) by mouth once daily., Disp: 90 tablet, Rfl: 3    pregabalin (LYRICA) 25 MG capsule, Take 1 capsule (25 mg total) by mouth 3 (three) times daily., Disp: 90 capsule, Rfl: 6    sertraline (ZOLOFT) 100 MG tablet, 1/2 tab po q AM x 7 days, the 1 tab po daily, Disp: 90 tablet, Rfl: 3    tadalafiL (CIALIS) 20 MG Tab, Take 1 tablet (20 mg total) by mouth once daily., Disp: 10 tablet, Rfl: 11    testosterone (TESTIM) 50 mg/5 gram (1 %) Gel, Apply 2-3 drops topically once daily., Disp: 2 each, Rfl: 2     SOCIAL HISTORY:   Social History     Socioeconomic History    Marital status: Single   Tobacco Use    Smoking status: Former     Passive exposure: Past    Smokeless tobacco: Never    Tobacco comments:     quit smoking at age 26, started at 12   Substance and Sexual Activity    Alcohol use: Not Currently    Drug use: Not Currently    Sexual activity: Yes   Social History Narrative    ** Merged History Encounter **             ROS    FAMILY HISTORY:   Family History   Problem Relation Age of Onset    Pacemaker/defibrilator Mother     Lung cancer Father     Cancer Maternal  Grandfather     Crohn's disease Maternal Cousin           PHYSICAL EXAM: There were no vitals filed for this visit.            There is no height or weight on file to calculate BMI.     In general, this is a well-developed, well-nourished male . The patient is alert, oriented and cooperative.      HEENT:  Normocephalic, atraumatic.  Extraocular movements are intact bilaterally.  The oropharynx is benign.       NECK:  Nontender with good range of motion.      PULMONARY: Respirations are even and non-labored.       CARDIOVASCULAR: Pulses regular by peripheral palpation.       ABDOMEN:  Soft, non-tender, non-distended.        EXTREMITIES:  Positive Phalen's test today mild pain with left shoulder pain no pain with motion his right shoulder.    Ortho Exam      RADIOGRAPHIC FINDINGS:  Left hand AP lateral oblique views normal carpal alignment the degenerative changes at the 1st metacarpal carpal joint no fracture dislocation appreciated    Right hand AP lateral oblique views normal carpal alignment there is normal bone mineralization there are degenerative changes noted of the 1st metacarpal carpal joint no fracture dislocation appreciated    Right shoulder AP and transscapular lateral view the joints congruent reduced there is some narrowing of the glenohumeral joint no fracture dislocation seen early degenerative changes noted    Left shoulder AP and transscapular lateral views there is narrowing of the glenohumeral joint joints congruent congruent reduced no fracture dislocation appreciated early degenerative changes      .  EMG is January 6, 2020   right carpal tunnel affecting both motor and sensory components  Left carpal tunnel affecting sensory component  Read by Dr. Naman Hagen    Addendum November 27, 2023    [facility] is [facility] dated November 22, 2023  Bilateral moderate carpal tunnel left greater than right by symptoms no evidence of acute cervical radiculopathy  No electrical physiological evidence of  brachial plexus injury ulnar nerve entrapment or large fiber neuropathy bilateral upper extremities      IMPRESSION:  Scheduled for EMG is tomorrow [facility] asked to send us a copy    PLAN:  Await EMG is probable carpal tunnel releases surgery was discussed with him risks benefits etc.      Assessment   bilateral carpal tunnel      Plan   left carpal tunnel release           I had a long discussion with the patient about treatment options, including operative and nonoperative treatments. We discussed pros and cons of each including risks pertinent to surgery including pain, infection, bleeding, damage to adjacent structures like nerves and blood vessels, failure to heal, need for future surgeries, stiffness, instability, loss of limb, anesthesia risks like stroke, blood clot, loss of life. We discussed the possibility of need for later hardware removal in the case that hardware was used. We discussed common and uncommon risks, and discussed patient specific factors that may increase the risks present with surgery. All questions were answered. The patient expressed understanding of the pros and cons of surgery and wanted to proceed with surgical treatment.       No follow-ups on file.         Jason Pelayo III      (Subject to voice recognition error, transcription service not allowed)

## 2023-11-29 ENCOUNTER — TELEPHONE (OUTPATIENT)
Dept: ORTHOPEDICS | Facility: CLINIC | Age: 43
End: 2023-11-29
Payer: COMMERCIAL

## 2023-11-29 DIAGNOSIS — G56.03 CARPAL TUNNEL SYNDROME ON BOTH SIDES: ICD-10-CM

## 2023-11-29 DIAGNOSIS — Z01.812 PRE-OPERATIVE LABORATORY EXAMINATION: ICD-10-CM

## 2023-11-29 DIAGNOSIS — Z01.810 PRE-OPERATIVE CARDIOVASCULAR EXAMINATION: Primary | ICD-10-CM

## 2023-11-29 NOTE — LETTER
Surgery Instructions     Your surgery is scheduled for 12/13/23 at [facility] on the   ground floor of the Ambulatory building. You should arrive at 7:00 AM at the   Ambulatory Care Center located at [facility] [address] [place].    Pre Op Testing: BEFORE 12/11    ____ Lab  (1st floor clinic)   ____ EKG  (2nd floor clinic)  ____ Chest xray (Imaging Center)       Our office will contact you the day before surgery with your arrival time.  DO NOT eat or drink anything after midnight the night before surgery (this includes gum, candy, chewing tobacco, etc).  You CAN NOT drive after surgery, please arrange for someone to drive you.  Bring all medication in their original bottles.  Bathe with Hibiclens the night or morning before your surgery.  The morning of your surgery ONLY take blood pressure, heart, acid reflux, or thyroid (if you take a morning dose) medication with a sip of water.   Be sure to have stopped your blood thinner medication at the appropriate time, as instructed.  Bring your C-Pap machine if you have one.  All jewelry, piercings, or false eyelashes MUST be removed prior to surgery.

## 2023-12-01 ENCOUNTER — PATIENT OUTREACH (OUTPATIENT)
Dept: FAMILY MEDICINE | Facility: CLINIC | Age: 43
End: 2023-12-01
Payer: COMMERCIAL

## 2023-12-01 ENCOUNTER — PATIENT OUTREACH (OUTPATIENT)
Dept: ADMINISTRATIVE | Facility: HOSPITAL | Age: 43
End: 2023-12-01

## 2023-12-01 ENCOUNTER — PATIENT MESSAGE (OUTPATIENT)
Dept: GASTROENTEROLOGY | Facility: CLINIC | Age: 43
End: 2023-12-01
Payer: COMMERCIAL

## 2023-12-01 DIAGNOSIS — Z12.11 COLON CANCER SCREENING: Primary | ICD-10-CM

## 2023-12-01 DIAGNOSIS — Z12.11 SCREENING FOR COLON CANCER: Primary | ICD-10-CM

## 2023-12-01 NOTE — PROGRESS NOTES
Population Health Chart Review & Patient Outreach Details    Campaign Outreach    Updates Requested / Reviewed:  [x]  Care Team Updated    Health Maintenance Topics Addressed and Outreach Outcomes / Actions Taken:            Colorectal Cancer Screening [x]  Colonoscopy Ordered.

## 2023-12-04 RX ORDER — POLYETHYLENE GLYCOL 3350, SODIUM SULFATE ANHYDROUS, SODIUM BICARBONATE, SODIUM CHLORIDE, POTASSIUM CHLORIDE 236; 22.74; 6.74; 5.86; 2.97 G/4L; G/4L; G/4L; G/4L; G/4L
4 POWDER, FOR SOLUTION ORAL ONCE
Qty: 4000 ML | Refills: 0 | Status: SHIPPED | OUTPATIENT
Start: 2023-12-04 | End: 2023-12-04

## 2023-12-07 ENCOUNTER — PATIENT MESSAGE (OUTPATIENT)
Dept: ADMINISTRATIVE | Facility: OTHER | Age: 43
End: 2023-12-07

## 2023-12-07 ENCOUNTER — CLINICAL SUPPORT (OUTPATIENT)
Dept: CARDIOLOGY | Facility: CLINIC | Age: 43
End: 2023-12-07
Payer: COMMERCIAL

## 2023-12-07 DIAGNOSIS — Z01.810 PRE-OPERATIVE CARDIOVASCULAR EXAMINATION: ICD-10-CM

## 2023-12-07 PROCEDURE — 93005 ELECTROCARDIOGRAM TRACING: CPT | Mod: PBBFAC | Performed by: STUDENT IN AN ORGANIZED HEALTH CARE EDUCATION/TRAINING PROGRAM

## 2023-12-07 PROCEDURE — 93010 EKG 12-LEAD: ICD-10-PCS | Mod: S$PBB,,, | Performed by: STUDENT IN AN ORGANIZED HEALTH CARE EDUCATION/TRAINING PROGRAM

## 2023-12-07 PROCEDURE — 93010 ELECTROCARDIOGRAM REPORT: CPT | Mod: S$PBB,,, | Performed by: STUDENT IN AN ORGANIZED HEALTH CARE EDUCATION/TRAINING PROGRAM

## 2023-12-07 PROCEDURE — 99211 OFF/OP EST MAY X REQ PHY/QHP: CPT | Mod: PBBFAC

## 2023-12-13 ENCOUNTER — HOSPITAL ENCOUNTER (OUTPATIENT)
Facility: HOSPITAL | Age: 43
Discharge: HOME | End: 2023-12-13
Attending: ORTHOPAEDIC SURGERY | Admitting: ORTHOPAEDIC SURGERY
Payer: COMMERCIAL

## 2023-12-13 ENCOUNTER — ANESTHESIA EVENT (OUTPATIENT)
Dept: SURGERY | Facility: HOSPITAL | Age: 43
End: 2023-12-13
Payer: COMMERCIAL

## 2023-12-13 ENCOUNTER — ANESTHESIA (OUTPATIENT)
Dept: SURGERY | Facility: HOSPITAL | Age: 43
End: 2023-12-13
Payer: COMMERCIAL

## 2023-12-13 VITALS
WEIGHT: 170 LBS | SYSTOLIC BLOOD PRESSURE: 114 MMHG | HEART RATE: 76 BPM | DIASTOLIC BLOOD PRESSURE: 72 MMHG | HEIGHT: 72 IN | OXYGEN SATURATION: 99 % | RESPIRATION RATE: 16 BRPM | BODY MASS INDEX: 23.03 KG/M2 | TEMPERATURE: 97 F

## 2023-12-13 DIAGNOSIS — G56.00 CTS (CARPAL TUNNEL SYNDROME): Primary | ICD-10-CM

## 2023-12-13 PROCEDURE — 37000008 HC ANESTHESIA 1ST 15 MINUTES: Performed by: ORTHOPAEDIC SURGERY

## 2023-12-13 PROCEDURE — 64721 CARPAL TUNNEL SURGERY: CPT | Mod: LT,,, | Performed by: ORTHOPAEDIC SURGERY

## 2023-12-13 PROCEDURE — 25000003 PHARM REV CODE 250: Performed by: ANESTHESIOLOGY

## 2023-12-13 PROCEDURE — 25000003 PHARM REV CODE 250: Performed by: ORTHOPAEDIC SURGERY

## 2023-12-13 PROCEDURE — D9220A PRA ANESTHESIA: ICD-10-PCS | Mod: ANES,,, | Performed by: ANESTHESIOLOGY

## 2023-12-13 PROCEDURE — 37000009 HC ANESTHESIA EA ADD 15 MINS: Performed by: ORTHOPAEDIC SURGERY

## 2023-12-13 PROCEDURE — D9220A PRA ANESTHESIA: ICD-10-PCS | Mod: CRNA,,, | Performed by: NURSE ANESTHETIST, CERTIFIED REGISTERED

## 2023-12-13 PROCEDURE — 64721 PR REVISE MEDIAN N/CARPAL TUNNEL SURG: ICD-10-PCS | Mod: LT,,, | Performed by: ORTHOPAEDIC SURGERY

## 2023-12-13 PROCEDURE — 71000016 HC POSTOP RECOV ADDL HR: Performed by: ORTHOPAEDIC SURGERY

## 2023-12-13 PROCEDURE — 63600175 PHARM REV CODE 636 W HCPCS: Performed by: ANESTHESIOLOGY

## 2023-12-13 PROCEDURE — 71000033 HC RECOVERY, INTIAL HOUR: Performed by: ORTHOPAEDIC SURGERY

## 2023-12-13 PROCEDURE — 63600175 PHARM REV CODE 636 W HCPCS: Performed by: ORTHOPAEDIC SURGERY

## 2023-12-13 PROCEDURE — 36000707: Performed by: ORTHOPAEDIC SURGERY

## 2023-12-13 PROCEDURE — D9220A PRA ANESTHESIA: Mod: CRNA,,, | Performed by: NURSE ANESTHETIST, CERTIFIED REGISTERED

## 2023-12-13 PROCEDURE — 71000015 HC POSTOP RECOV 1ST HR: Performed by: ORTHOPAEDIC SURGERY

## 2023-12-13 PROCEDURE — D9220A PRA ANESTHESIA: Mod: ANES,,, | Performed by: ANESTHESIOLOGY

## 2023-12-13 PROCEDURE — 36000706: Performed by: ORTHOPAEDIC SURGERY

## 2023-12-13 RX ORDER — LIDOCAINE HYDROCHLORIDE 20 MG/ML
INJECTION, SOLUTION EPIDURAL; INFILTRATION; INTRACAUDAL; PERINEURAL
Status: DISCONTINUED | OUTPATIENT
Start: 2023-12-13 | End: 2023-12-13

## 2023-12-13 RX ORDER — MIDAZOLAM HYDROCHLORIDE 1 MG/ML
INJECTION INTRAMUSCULAR; INTRAVENOUS
Status: DISCONTINUED | OUTPATIENT
Start: 2023-12-13 | End: 2023-12-13

## 2023-12-13 RX ORDER — DEXAMETHASONE SODIUM PHOSPHATE 100 MG/10ML
INJECTION INTRAMUSCULAR; INTRAVENOUS
Status: DISCONTINUED | OUTPATIENT
Start: 2023-12-13 | End: 2023-12-13

## 2023-12-13 RX ORDER — DIPHENHYDRAMINE HYDROCHLORIDE 50 MG/ML
25 INJECTION INTRAMUSCULAR; INTRAVENOUS EVERY 6 HOURS PRN
Status: DISCONTINUED | OUTPATIENT
Start: 2023-12-13 | End: 2023-12-13 | Stop reason: HOSPADM

## 2023-12-13 RX ORDER — LIDOCAINE HYDROCHLORIDE 10 MG/ML
1 INJECTION, SOLUTION EPIDURAL; INFILTRATION; INTRACAUDAL; PERINEURAL ONCE
Status: CANCELLED | OUTPATIENT
Start: 2023-12-13 | End: 2023-12-13

## 2023-12-13 RX ORDER — BUPIVACAINE HYDROCHLORIDE 5 MG/ML
INJECTION, SOLUTION EPIDURAL; INTRACAUDAL
Status: DISCONTINUED | OUTPATIENT
Start: 2023-12-13 | End: 2023-12-13 | Stop reason: HOSPADM

## 2023-12-13 RX ORDER — ONDANSETRON 2 MG/ML
INJECTION INTRAMUSCULAR; INTRAVENOUS
Status: DISCONTINUED | OUTPATIENT
Start: 2023-12-13 | End: 2023-12-13

## 2023-12-13 RX ORDER — CLINDAMYCIN PHOSPHATE 900 MG/50ML
INJECTION, SOLUTION INTRAVENOUS
Status: DISCONTINUED | OUTPATIENT
Start: 2023-12-13 | End: 2023-12-13

## 2023-12-13 RX ORDER — FENTANYL CITRATE 50 UG/ML
INJECTION, SOLUTION INTRAMUSCULAR; INTRAVENOUS
Status: DISCONTINUED | OUTPATIENT
Start: 2023-12-13 | End: 2023-12-13

## 2023-12-13 RX ORDER — PROPOFOL 10 MG/ML
VIAL (ML) INTRAVENOUS
Status: DISCONTINUED | OUTPATIENT
Start: 2023-12-13 | End: 2023-12-13

## 2023-12-13 RX ORDER — SODIUM CHLORIDE, SODIUM LACTATE, POTASSIUM CHLORIDE, CALCIUM CHLORIDE 600; 310; 30; 20 MG/100ML; MG/100ML; MG/100ML; MG/100ML
125 INJECTION, SOLUTION INTRAVENOUS CONTINUOUS
Status: DISCONTINUED | OUTPATIENT
Start: 2023-12-13 | End: 2023-12-13 | Stop reason: HOSPADM

## 2023-12-13 RX ORDER — MORPHINE SULFATE 10 MG/ML
4 INJECTION INTRAMUSCULAR; INTRAVENOUS; SUBCUTANEOUS EVERY 5 MIN PRN
Status: DISCONTINUED | OUTPATIENT
Start: 2023-12-13 | End: 2023-12-13 | Stop reason: HOSPADM

## 2023-12-13 RX ORDER — LIDOCAINE HYDROCHLORIDE 10 MG/ML
1 INJECTION, SOLUTION EPIDURAL; INFILTRATION; INTRACAUDAL; PERINEURAL ONCE AS NEEDED
Status: DISCONTINUED | OUTPATIENT
Start: 2023-12-13 | End: 2023-12-13 | Stop reason: HOSPADM

## 2023-12-13 RX ORDER — ONDANSETRON 4 MG/1
8 TABLET, ORALLY DISINTEGRATING ORAL EVERY 8 HOURS PRN
Status: DISCONTINUED | OUTPATIENT
Start: 2023-12-13 | End: 2023-12-13 | Stop reason: HOSPADM

## 2023-12-13 RX ORDER — DIAZEPAM 5 MG/1
10 TABLET ORAL
Status: COMPLETED | OUTPATIENT
Start: 2023-12-13 | End: 2023-12-13

## 2023-12-13 RX ORDER — OXYCODONE HYDROCHLORIDE 5 MG/1
5 TABLET ORAL
Status: DISCONTINUED | OUTPATIENT
Start: 2023-12-13 | End: 2023-12-13 | Stop reason: HOSPADM

## 2023-12-13 RX ORDER — MEPERIDINE HYDROCHLORIDE 25 MG/ML
25 INJECTION INTRAMUSCULAR; INTRAVENOUS; SUBCUTANEOUS EVERY 10 MIN PRN
Status: DISCONTINUED | OUTPATIENT
Start: 2023-12-13 | End: 2023-12-13 | Stop reason: HOSPADM

## 2023-12-13 RX ORDER — SODIUM CHLORIDE, SODIUM LACTATE, POTASSIUM CHLORIDE, CALCIUM CHLORIDE 600; 310; 30; 20 MG/100ML; MG/100ML; MG/100ML; MG/100ML
INJECTION, SOLUTION INTRAVENOUS CONTINUOUS
Status: CANCELLED | OUTPATIENT
Start: 2023-12-13

## 2023-12-13 RX ORDER — HYDROCODONE BITARTRATE AND ACETAMINOPHEN 5; 325 MG/1; MG/1
1 TABLET ORAL EVERY 6 HOURS PRN
Qty: 20 TABLET | Refills: 0 | Status: SHIPPED | OUTPATIENT
Start: 2023-12-13

## 2023-12-13 RX ORDER — EPHEDRINE SULFATE 50 MG/ML
INJECTION, SOLUTION INTRAVENOUS
Status: DISCONTINUED | OUTPATIENT
Start: 2023-12-13 | End: 2023-12-13

## 2023-12-13 RX ORDER — SODIUM CHLORIDE 9 MG/ML
INJECTION, SOLUTION INTRAVENOUS CONTINUOUS
Status: DISCONTINUED | OUTPATIENT
Start: 2023-12-13 | End: 2023-12-13 | Stop reason: HOSPADM

## 2023-12-13 RX ORDER — ONDANSETRON 2 MG/ML
4 INJECTION INTRAMUSCULAR; INTRAVENOUS DAILY PRN
Status: DISCONTINUED | OUTPATIENT
Start: 2023-12-13 | End: 2023-12-13 | Stop reason: HOSPADM

## 2023-12-13 RX ORDER — HYDROMORPHONE HYDROCHLORIDE 2 MG/ML
0.5 INJECTION, SOLUTION INTRAMUSCULAR; INTRAVENOUS; SUBCUTANEOUS EVERY 5 MIN PRN
Status: DISCONTINUED | OUTPATIENT
Start: 2023-12-13 | End: 2023-12-13 | Stop reason: HOSPADM

## 2023-12-13 RX ADMIN — FENTANYL CITRATE 100 MCG: 50 INJECTION INTRAMUSCULAR; INTRAVENOUS at 10:12

## 2023-12-13 RX ADMIN — ONDANSETRON 4 MG: 2 INJECTION INTRAMUSCULAR; INTRAVENOUS at 10:12

## 2023-12-13 RX ADMIN — DEXAMETHASONE SODIUM PHOSPHATE 6 MG: 10 INJECTION INTRAMUSCULAR; INTRAVENOUS at 10:12

## 2023-12-13 RX ADMIN — SODIUM CHLORIDE: 9 INJECTION, SOLUTION INTRAVENOUS at 08:12

## 2023-12-13 RX ADMIN — EPHEDRINE SULFATE 10 MG: 50 INJECTION INTRAVENOUS at 11:12

## 2023-12-13 RX ADMIN — PROPOFOL 150 MG: 10 INJECTION, EMULSION INTRAVENOUS at 10:12

## 2023-12-13 RX ADMIN — MIDAZOLAM HYDROCHLORIDE 2 MG: 1 INJECTION, SOLUTION INTRAMUSCULAR; INTRAVENOUS at 10:12

## 2023-12-13 RX ADMIN — CLINDAMYCIN IN 5 PERCENT DEXTROSE 900 MG: 18 INJECTION, SOLUTION INTRAVENOUS at 10:12

## 2023-12-13 RX ADMIN — LIDOCAINE HYDROCHLORIDE 40 MG: 20 INJECTION, SOLUTION INTRAVENOUS at 10:12

## 2023-12-13 RX ADMIN — OXYCODONE HYDROCHLORIDE 5 MG: 5 TABLET ORAL at 12:12

## 2023-12-13 RX ADMIN — EPHEDRINE SULFATE 10 MG: 50 INJECTION INTRAVENOUS at 10:12

## 2023-12-13 RX ADMIN — DIAZEPAM 10 MG: 5 TABLET ORAL at 08:12

## 2023-12-13 NOTE — ANESTHESIA PREPROCEDURE EVALUATION
12/13/2023  Alda Gamboa is a 43 y.o., male.      Pre-op Assessment    I have reviewed the Patient Summary Reports.     I have reviewed the Nursing Notes. I have reviewed the NPO Status.   I have reviewed the Medications.     Review of Systems  Anesthesia Hx:  No problems with previous Anesthesia                Social:  Non-Smoker, No Alcohol Use       Hematology/Oncology:  Hematology Normal   Oncology Normal                                   EENT/Dental:  EENT/Dental Normal           Cardiovascular:  Cardiovascular Normal                                            Pulmonary:  Pulmonary Normal                       Renal/:  Renal/ Normal                 Hepatic/GI:  Hepatic/GI Normal                 Musculoskeletal:  Arthritis               Neurological:  Neurology Normal                                      Endocrine:  Endocrine Normal            Dermatological:  Skin Normal    Psych:  Psychiatric Normal                    Physical Exam  General: Well nourished    Airway:  Mallampati: II / II  Mouth Opening: Normal  TM Distance: > 6 cm  Tongue: Normal  Neck ROM: Normal ROM    Chest/Lungs:  Clear to auscultation, Normal Respiratory Rate    Heart:  Rate: Normal  Rhythm: Regular Rhythm        Anesthesia Plan  Type of Anesthesia, risks & benefits discussed:    Anesthesia Type: Gen Supraglottic Airway  Intra-op Monitoring Plan: Standard ASA Monitors  Post Op Pain Control Plan: multimodal analgesia  Induction:  IV  Informed Consent: Informed consent signed with the Patient and all parties understand the risks and agree with anesthesia plan.  All questions answered. Patient consented to blood products? Yes  ASA Score: 1  Day of Surgery Review of History & Physical: H&P Update referred to the surgeon/provider.I have interviewed and examined the patient. I have reviewed the patient's H&P dated: There are no  significant changes.     Ready For Surgery From Anesthesia Perspective.     .

## 2023-12-13 NOTE — BRIEF OP NOTE
[facility] - Orthopedic Periop Services  Brief Operative Note    S    NAME: Alda Gamboa  MRN: [id number]  SURGERY DATE: 12/13/2023       PREOPERATIVE DIAGNOSIS: Left carpal tunnel syndrome    POSTOPERATIVE DIAGNOSIS:  Left carpal tunnel syndrome    PROCEDURE: left carpal tunnel release     SURGEON: Dr. Pelayo     ASSISTANT:  None    ANESTHESIA:  General with a local    ESTIMATED BLOOD LOSS:  5 cc    COMPLICATIONS:  None     Specimens:   Specimen (24h ago, onward)      None              Discharge Note    OUTCOME: Patient tolerated treatment/procedure well without complication and is now ready for discharge.    DISPOSITION: Home or Self Care    FINAL DIAGNOSIS:  Carpal tunnel syndrome on both sides    FOLLOWUP: In clinic    DISCHARGE INSTRUCTIONS:    Discharge Procedure Orders   Diet general     Call MD for:  temperature >100.4     Call MD for:  redness, tenderness, or signs of infection (pain, swelling, redness, odor or green/yellow discharge around incision site)     Leave dressing on - Keep it clean, dry, and intact until clinic visit         Jason Pelayo III

## 2023-12-13 NOTE — TRANSFER OF CARE
Anesthesia Transfer of Care Note    Patient: Alda Gamboa    Procedure(s) Performed: Procedure(s) (LRB):  RELEASE, CARPAL TUNNEL (Left)    Patient location: PACU    Anesthesia Type: general    Transport from OR: Transported from OR on room air with adequate spontaneous ventilation    Post pain: adequate analgesia    Post assessment: no apparent anesthetic complications    Post vital signs: stable    Level of consciousness: responds to stimulation    Nausea/Vomiting: no nausea/vomiting    Complications: none    Transfer of care protocol was followed      Last vitals: Visit Vitals  /77 (Patient Position: Lying)   Pulse 61   Temp 36.4 °C (97.6 °F) (Oral)   Resp 12   Ht 6' (1.829 m)   Wt 77.1 kg (170 lb)   SpO2 96%   BMI 23.06 kg/m²

## 2023-12-13 NOTE — OP NOTE
Department of Orthopedic Surgery    Operative Note      NAME: Alda Gamboa  MRN: [id number]  SURGERY DATE: 12/13/2023       PREOPERATIVE DIAGNOSIS: Left carpal tunnel syndrome    POSTOPERATIVE DIAGNOSIS:  Left carpal tunnel syndrome    PROCEDURE: left carpal tunnel release     SURGEON: Dr. Pelayo     ASSISTANT:  None    ANESTHESIA:  General with a local    ESTIMATED BLOOD LOSS:  5 cc    COMPLICATIONS:  None    INDICATION: Alda Gamboa is a 43 y.o. year old  who has failed conservative treatment.  EMG's were positive for carpal tunnel syndrome.  Risk and benefits were discussed at length.        PROCEDURE: The patient was brought to the operating room and anesthesia was administered per anesthesia.  The left arm was prepped and draped in normal sterile fashion.  A volar approach with right angle was made at the level of the wrist.  Sharp dissection was carried down to the level of the transverse carpal ligament.  A [place] was passed underneath the transverse carpal ligament and brought superficially and ulnarly.  Sharp dissection was carried down.  The most proximal and distal aspects were released under direct visualization using [place] being sure to protect the nerve.  Little finger could be passed proximally and distally without any difficulty.  The wound was irrigated out copiously and injected with plain Marcaine.  The tourniquet was released.  Hemostasis was obtained.  The wound was closed with 4-0 Vicryl interrupted and 4-0 Nylon corner stitch and running suture.  A sterile bulky dressing was applied, leaving the fingers free for range of motion.   The patient tolerated the procedure well.      Jason Pelayo III

## 2023-12-13 NOTE — ANESTHESIA POSTPROCEDURE EVALUATION
Anesthesia Post Evaluation    Patient: Alda Gamboa    Procedure(s) Performed: Procedure(s) (LRB):  RELEASE, CARPAL TUNNEL (Left)    Final Anesthesia Type: general      Patient location during evaluation: PACU  Patient participation: Yes- Able to Participate  Level of consciousness: awake and sedated  Post-procedure vital signs: reviewed and stable  Pain management: adequate  Airway patency: patent    PONV status at discharge: No PONV  Anesthetic complications: no      Cardiovascular status: blood pressure returned to baseline  Respiratory status: unassisted  Hydration status: euvolemic  Follow-up not needed.              Vitals Value Taken Time   /72 12/13/23 1246   Temp 36.1 °C (97 °F) 12/13/23 1119   Pulse 76 12/13/23 1249   Resp 16 12/13/23 1243   SpO2 98 % 12/13/23 1249   Vitals shown include unvalidated device data.      No case tracking events are documented in the log.      Pain/Sam Score: Pain Rating Prior to Med Admin: 6 (12/13/2023 12:43 PM)  Sam Score: 10 (12/13/2023 12:11 PM)

## 2023-12-15 ENCOUNTER — TELEPHONE (OUTPATIENT)
Dept: ORTHOPEDICS | Facility: CLINIC | Age: 43
End: 2023-12-15
Payer: COMMERCIAL

## 2023-12-15 NOTE — TELEPHONE ENCOUNTER
Patience Lowery called regarding her . He had carpel tunnel surgery this past Wed. He has been non compliant and has been lifting with that hand, he was at [employer] had something in that hand and it slipped. It moved his pinky/wrist and he had pain shoot up to his elbow. It is swollen and sore. They have been icing.  I spoke with Dr Pelayo, he said keep dressing on. I notified Patience.

## 2023-12-19 ENCOUNTER — OFFICE VISIT (OUTPATIENT)
Dept: FAMILY MEDICINE | Facility: CLINIC | Age: 43
End: 2023-12-19
Payer: COMMERCIAL

## 2023-12-19 VITALS
OXYGEN SATURATION: 96 % | DIASTOLIC BLOOD PRESSURE: 74 MMHG | SYSTOLIC BLOOD PRESSURE: 120 MMHG | RESPIRATION RATE: 20 BRPM | BODY MASS INDEX: 23.03 KG/M2 | HEIGHT: 72 IN | WEIGHT: 170 LBS | HEART RATE: 84 BPM

## 2023-12-19 DIAGNOSIS — Z00.00 ROUTINE GENERAL MEDICAL EXAMINATION AT A HEALTH CARE FACILITY: ICD-10-CM

## 2023-12-19 DIAGNOSIS — H93.90 EAR LESION: Primary | ICD-10-CM

## 2023-12-19 PROCEDURE — 3078F PR MOST RECENT DIASTOLIC BLOOD PRESSURE < 80 MM HG: ICD-10-PCS | Mod: ,,, | Performed by: FAMILY MEDICINE

## 2023-12-19 PROCEDURE — 3008F PR BODY MASS INDEX (BMI) DOCUMENTED: ICD-10-PCS | Mod: ,,, | Performed by: FAMILY MEDICINE

## 2023-12-19 PROCEDURE — 1159F PR MEDICATION LIST DOCUMENTED IN MEDICAL RECORD: ICD-10-PCS | Mod: ,,, | Performed by: FAMILY MEDICINE

## 2023-12-19 PROCEDURE — 3074F SYST BP LT 130 MM HG: CPT | Mod: ,,, | Performed by: FAMILY MEDICINE

## 2023-12-19 PROCEDURE — 3074F PR MOST RECENT SYSTOLIC BLOOD PRESSURE < 130 MM HG: ICD-10-PCS | Mod: ,,, | Performed by: FAMILY MEDICINE

## 2023-12-19 PROCEDURE — 3008F BODY MASS INDEX DOCD: CPT | Mod: ,,, | Performed by: FAMILY MEDICINE

## 2023-12-19 PROCEDURE — 3044F PR MOST RECENT HEMOGLOBIN A1C LEVEL <7.0%: ICD-10-PCS | Mod: ,,, | Performed by: FAMILY MEDICINE

## 2023-12-19 PROCEDURE — 99396 PREV VISIT EST AGE 40-64: CPT | Mod: ,,, | Performed by: FAMILY MEDICINE

## 2023-12-19 PROCEDURE — 1159F MED LIST DOCD IN RCRD: CPT | Mod: ,,, | Performed by: FAMILY MEDICINE

## 2023-12-19 PROCEDURE — 3078F DIAST BP <80 MM HG: CPT | Mod: ,,, | Performed by: FAMILY MEDICINE

## 2023-12-19 PROCEDURE — 99396 PR PREVENTIVE VISIT,EST,40-64: ICD-10-PCS | Mod: ,,, | Performed by: FAMILY MEDICINE

## 2023-12-19 PROCEDURE — 3044F HG A1C LEVEL LT 7.0%: CPT | Mod: ,,, | Performed by: FAMILY MEDICINE

## 2023-12-19 NOTE — PROGRESS NOTES
Subjective     Patient ID: Alda Gamboa is a 43 y.o. male.    Chief Complaint: Healthy You (Z00.00), Rash (Spot on ear and scalp might be cancerous ), and Memory Loss (Very forgetful.repetitive )    HY Had carpal tunnel surgery three weeks ago on L wrist.Repeats himself.      Rash  Pertinent negatives include no congestion, eye pain, fatigue, fever, shortness of breath or sore throat.     Review of Systems   Constitutional:  Negative for activity change, appetite change, fatigue, fever and unexpected weight change.   HENT:  Negative for nasal congestion, dental problem, ear pain, hearing loss, mouth sores, nosebleeds, sore throat, tinnitus and voice change.    Eyes:  Negative for pain, discharge and visual disturbance.   Respiratory:  Negative for apnea, choking, chest tightness, shortness of breath and wheezing.    Cardiovascular:  Negative for chest pain, palpitations, leg swelling and claudication.   Gastrointestinal:  Negative for abdominal pain, blood in stool and change in bowel habit.   Endocrine: Negative for polydipsia, polyphagia and polyuria.   Genitourinary:  Negative for bladder incontinence, dysuria, erectile dysfunction, flank pain, genital sores and hematuria.   Musculoskeletal:  Negative for arthralgias, gait problem, neck pain and neck stiffness.   Integumentary:  Positive for rash. Negative for wound, mole/lesion, breast mass and breast discharge.   Allergic/Immunologic: Negative for food allergies.   Neurological:  Negative for seizures, syncope, headaches, memory loss and coordination difficulties.   Hematological:  Negative for adenopathy. Does not bruise/bleed easily.   Psychiatric/Behavioral:  Negative for agitation, behavioral problems, confusion, decreased concentration, hallucinations, self-injury, sleep disturbance and suicidal ideas. The patient is not nervous/anxious.    Breast: Negative for mass      Tobacco Use: Medium Risk (12/19/2023)    Patient History     Smoking Tobacco  Use: Former     Smokeless Tobacco Use: Never     Passive Exposure: Past     Review of patient's allergies indicates:   Allergen Reactions    Pcn [penicillins] Hives and Swelling     Throat swelling    Sulfasalazine      Other reaction(s): Unknown    Gabapentin Nausea Only and Rash     Other reaction(s): Unknown     Current Outpatient Medications   Medication Instructions    clonazePAM (KLONOPIN) 1 mg, Oral, 2 times daily PRN    cyanocobalamin 1,000 mcg, Intramuscular, Every 30 days    cyclobenzaprine (FLEXERIL) 10 mg, Oral, Nightly    diclofenac (VOLTAREN) 75 mg, Oral, 2 times daily PRN    ergocalciferol (ERGOCALCIFEROL) 50,000 Units, Oral, Every 7 days    HYDROcodone-acetaminophen (NORCO) 5-325 mg per tablet 1 tablet, Oral, Every 6 hours PRN    pantoprazole (PROTONIX) 40 mg, Oral, Daily    pregabalin (LYRICA) 25 mg, Oral, 3 times daily    sertraline (ZOLOFT) 100 MG tablet 1/2 tab po q AM x 7 days, the 1 tab po daily    tadalafiL (CIALIS) 20 mg, Oral, Daily    TALTZ AUTOINJECTOR 80 mg, Subcutaneous, Every 28 days    testosterone (TESTIM) 50 mg/5 gram (1 %) Gel 2-3 drops, Topical (Top), Daily     There are no discontinued medications.    Past Medical History:   Diagnosis Date    Acute bacterial prostatitis 5/31/2022    Positive Cx for UTI with scant hematuria Treated with 10 day course of Bactrim DS, completed yesterday    Ankylosing spondylitis 5/31/2022    Patient takes immunosuppression - recently changed to Cimzia, Taltz Followed by Dr. Medel for this    Back pain     Elevated prostate specific antigen (PSA) 8/12/2022    Lab Results Component Value Date  PSA 7.600 (H) 08/10/2022  PSA 8.580 (H) 07/15/2022 s/p month of antx for tx of acute exacerbation of chronic prostatitis    Neck pain     Spondylosis      Health Maintenance Topics with due status: Not Due       Topic Last Completion Date    TETANUS VACCINE 08/04/2020    Lipid Panel 12/18/2023     Immunization History   Administered Date(s)  Administered    COVID-19, MRNA, LN-S, PF (MODERNA FULL 0.5 ML DOSE) 03/23/2021, 04/21/2021, 10/12/2021    Influenza - Quadrivalent - PF *Preferred* (6 months and older) 11/18/2022, 10/23/2023    Tdap 08/04/2020       Objective     Body mass index is 23.06 kg/m².  Wt Readings from Last 3 Encounters:   12/19/23 77.1 kg (170 lb)   12/13/23 77.1 kg (170 lb)   10/23/23 76.8 kg (169 lb 6.4 oz)     Ht Readings from Last 3 Encounters:   12/19/23 6' (1.829 m)   12/13/23 6' (1.829 m)   10/23/23 6' (1.829 m)     BP Readings from Last 3 Encounters:   12/19/23 120/74   12/13/23 114/72   10/23/23 122/72     Temp Readings from Last 3 Encounters:   12/13/23 97 °F (36.1 °C) (Oral)   05/09/23 98.3 °F (36.8 °C) (Oral)   12/28/22 98 °F (36.7 °C)     Pulse Readings from Last 3 Encounters:   12/19/23 84   12/13/23 76   10/23/23 74     Resp Readings from Last 3 Encounters:   12/19/23 20   12/13/23 16   10/23/23 20     PF Readings from Last 3 Encounters:   No data found for PF       Physical Exam  Constitutional:       General: He is not in acute distress.     Appearance: Normal appearance.   HENT:      Head: Normocephalic.      Right Ear: Tympanic membrane and ear canal normal.      Left Ear: Tympanic membrane and ear canal normal.      Nose: Nose normal.      Mouth/Throat:      Mouth: Mucous membranes are moist.      Pharynx: No oropharyngeal exudate.   Eyes:      Extraocular Movements: Extraocular movements intact.      Pupils: Pupils are equal, round, and reactive to light.   Cardiovascular:      Rate and Rhythm: Normal rate and regular rhythm.      Heart sounds: No murmur heard.  Pulmonary:      Effort: Pulmonary effort is normal.      Breath sounds: Normal breath sounds. No wheezing.   Abdominal:      General: Abdomen is flat. Bowel sounds are normal.      Palpations: Abdomen is soft.      Hernia: No hernia is present.   Musculoskeletal:         General: Normal range of motion.      Cervical back: Normal range of motion and neck  supple.      Right lower leg: No edema.      Left lower leg: No edema.   Lymphadenopathy:      Cervical: No cervical adenopathy.   Skin:     General: Skin is warm and dry.      Coloration: Skin is not jaundiced.      Findings: Lesion present.      Comments: Has linear 6 mm lesion inside helix of r ear.   Neurological:      General: No focal deficit present.      Mental Status: He is alert and oriented to person, place, and time.      Cranial Nerves: No cranial nerve deficit.      Gait: Gait normal.   Psychiatric:         Mood and Affect: Mood normal.         Behavior: Behavior normal.         Judgment: Judgment normal.       Assessment and Plan     Problem List Items Addressed This Visit    None      Plan: The current medical regimen is effective;  continue present plan and medications.      I have reviewed the medications, allergies, and problem list.     Goal Actions:    What type of visit is the patient here for today?: Healthy You  Does the patient consent to enroll in [facility] Healthy?: Yes  Is this a Wellness Follow Up?: Yes  What is your overall wellness goal? (select at least one): Improve overall health  Choose 3: Lifestyle, Nutrition, Exercise  Lifestyle Actions : Take medications as prescribed  Nurtrition Actions: Eat a well-balanced diet  Exercise Actions: Recommend physical activity 30 minutes per day 3-5 times/week

## 2023-12-20 ENCOUNTER — PATIENT OUTREACH (OUTPATIENT)
Dept: ADMINISTRATIVE | Facility: HOSPITAL | Age: 43
End: 2023-12-20

## 2023-12-20 ENCOUNTER — OFFICE VISIT (OUTPATIENT)
Dept: RHEUMATOLOGY | Facility: CLINIC | Age: 43
End: 2023-12-20
Payer: COMMERCIAL

## 2023-12-20 VITALS
BODY MASS INDEX: 23.03 KG/M2 | OXYGEN SATURATION: 98 % | WEIGHT: 170 LBS | RESPIRATION RATE: 16 BRPM | SYSTOLIC BLOOD PRESSURE: 138 MMHG | DIASTOLIC BLOOD PRESSURE: 80 MMHG | HEIGHT: 72 IN | HEART RATE: 68 BPM

## 2023-12-20 DIAGNOSIS — Z98.890 HISTORY OF CARPAL TUNNEL SURGERY OF LEFT WRIST: ICD-10-CM

## 2023-12-20 DIAGNOSIS — G56.01 CARPAL TUNNEL SYNDROME ON RIGHT: ICD-10-CM

## 2023-12-20 DIAGNOSIS — M45.9 ANKYLOSING SPONDYLITIS, UNSPECIFIED SITE OF SPINE: Primary | ICD-10-CM

## 2023-12-20 PROCEDURE — 1159F MED LIST DOCD IN RCRD: CPT | Mod: ,,, | Performed by: INTERNAL MEDICINE

## 2023-12-20 PROCEDURE — 99215 PR OFFICE/OUTPT VISIT, EST, LEVL V, 40-54 MIN: ICD-10-PCS | Mod: 25,S$PBB,, | Performed by: INTERNAL MEDICINE

## 2023-12-20 PROCEDURE — 20526 THER INJECTION CARP TUNNEL: CPT | Mod: PBBFAC,RT | Performed by: INTERNAL MEDICINE

## 2023-12-20 PROCEDURE — 3044F HG A1C LEVEL LT 7.0%: CPT | Mod: ,,, | Performed by: INTERNAL MEDICINE

## 2023-12-20 PROCEDURE — 99215 OFFICE O/P EST HI 40 MIN: CPT | Mod: PBBFAC | Performed by: INTERNAL MEDICINE

## 2023-12-20 PROCEDURE — 3075F PR MOST RECENT SYSTOLIC BLOOD PRESS GE 130-139MM HG: ICD-10-PCS | Mod: ,,, | Performed by: INTERNAL MEDICINE

## 2023-12-20 PROCEDURE — 99999PBSHW PR PBB SHADOW TECHNICAL ONLY FILED TO HB: Mod: PBBFAC,,,

## 2023-12-20 PROCEDURE — 99999PBSHW PR PBB SHADOW TECHNICAL ONLY FILED TO HB: ICD-10-PCS | Mod: PBBFAC,,,

## 2023-12-20 PROCEDURE — 3075F SYST BP GE 130 - 139MM HG: CPT | Mod: ,,, | Performed by: INTERNAL MEDICINE

## 2023-12-20 PROCEDURE — 99215 OFFICE O/P EST HI 40 MIN: CPT | Mod: 25,S$PBB,, | Performed by: INTERNAL MEDICINE

## 2023-12-20 PROCEDURE — 3079F PR MOST RECENT DIASTOLIC BLOOD PRESSURE 80-89 MM HG: ICD-10-PCS | Mod: ,,, | Performed by: INTERNAL MEDICINE

## 2023-12-20 PROCEDURE — 1159F PR MEDICATION LIST DOCUMENTED IN MEDICAL RECORD: ICD-10-PCS | Mod: ,,, | Performed by: INTERNAL MEDICINE

## 2023-12-20 PROCEDURE — 3079F DIAST BP 80-89 MM HG: CPT | Mod: ,,, | Performed by: INTERNAL MEDICINE

## 2023-12-20 PROCEDURE — 20526 CARPAL TUNNEL: ICD-10-PCS | Mod: S$PBB,RT,, | Performed by: INTERNAL MEDICINE

## 2023-12-20 PROCEDURE — 3044F PR MOST RECENT HEMOGLOBIN A1C LEVEL <7.0%: ICD-10-PCS | Mod: ,,, | Performed by: INTERNAL MEDICINE

## 2023-12-20 RX ORDER — METHYLPREDNISOLONE ACETATE 40 MG/ML
40 INJECTION, SUSPENSION INTRA-ARTICULAR; INTRALESIONAL; INTRAMUSCULAR; SOFT TISSUE
Status: DISCONTINUED | OUTPATIENT
Start: 2023-12-20 | End: 2023-12-20 | Stop reason: HOSPADM

## 2023-12-20 RX ORDER — METHYLPREDNISOLONE ACETATE 40 MG/ML
40 INJECTION, SUSPENSION INTRA-ARTICULAR; INTRALESIONAL; INTRAMUSCULAR; SOFT TISSUE
Status: COMPLETED | OUTPATIENT
Start: 2023-12-20 | End: 2023-12-20

## 2023-12-20 RX ADMIN — METHYLPREDNISOLONE ACETATE 40 MG: 40 INJECTION, SUSPENSION INTRA-ARTICULAR; INTRALESIONAL; INTRAMUSCULAR; INTRASYNOVIAL; SOFT TISSUE at 01:12

## 2023-12-20 RX ADMIN — METHYLPREDNISOLONE ACETATE 40 MG: 40 INJECTION, SUSPENSION INTRA-ARTICULAR; INTRALESIONAL; INTRAMUSCULAR; SOFT TISSUE at 02:12

## 2023-12-20 NOTE — PROGRESS NOTES
Anuradha Cruz MD   [facility]  [address] [facility]  [place] [address]  [phone]      PATIENT NAME: Alda Gamboa  : 1980  DATE: 23  MRN: [id number]      Billing Provider: Anuradha Cruz MD  Level of Service:   Patient PCP Information       Provider PCP Type    [place] MD General              Assessment and Plan (including Health Maintenance)      Problem List  Smart Sets  Document Outside HM   :    Plan:     Has started testosterone gel   Also using Taltz now  Prior biologics have helped as well but had presented with wrist flare   However states that his hands and knuckles are still painful [but 'is pulling them out' ].   Tingling in hands -sees neurology for it. Dx with carpal tunnel syndrome. Arms go numb as well.   Per wife - is only able to work 2 hours at a time. Has lost 2 jobs this year due to lack of productivity. Has been working since age 13 and has only lost 5 weeks of work since age 13. 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 Is working as a  now.   Fatigue is likely related to Lyrica. But if he stops it -then the tingling worsening. My advice is to take time off work to start weaning off Klonipin [ may be contributing to fatigue] and to allow him respite time to also developed either a new trade or get the rehab that he needs.   I can refer him to [facility] therapy as well.     2023;  Dx with carpal tunnel - sx ongiong for 'years'  Recently had CTS surgery to left hand  Still has ongoing symptoms in right hand  Will also refer to OT today   Right CTS IAS injection today.           Total time spent in Assessment, Co-ordination of Care , Review of Care Plan , Goal Setting and Counseling on this initial visit is 60 minutes     There is currently no information documented on the homunculus. Go to the Rheumatology activity and complete the homunculus joint exam.               History of Present Illness / Problem  Focused Workflow     Alda Gamboa presents to the clinic with Ankylosing Spondylitis (Follow up.)     Has been living with back pain since his teens. Has been assessed by chiropractor in the earlier years who was the first to suspect a systemic inflammatory syndrome. Was then referred to Dr. Medel and was then subsequently attached to Rheumatology at [facility].   Humira was started by Dr. Cano in [place]. Was on mtx previousy which really upset his stomach.   Also tried and failed cimzia.   Has been on Taltz for a year.   Also takes diclofenac twice daily   Works as an   Physical fatigue +   Kinesthetic person.   Lower extremity radiculopathy.   Also known to have low testosterone and was on replacement - developed prostatitis off testosterone.       Review of Systems     Review of Systems   Constitutional:  Negative for fever and unexpected weight change.   HENT:  Positive for mouth sores and trouble swallowing.    Eyes:  Negative for redness.   Respiratory:  Positive for shortness of breath. Negative for cough.    Cardiovascular:  Positive for chest pain.   Gastrointestinal:  Negative for constipation and diarrhea.   Genitourinary:  Negative for genital sores.   Skin:  Negative for rash.   Neurological:  Positive for headaches.   Hematological:  Does not bruise/bleed easily.     Medical / Social / Family History     Past Medical History:   Diagnosis Date    Acute bacterial prostatitis 5/31/2022    Positive Cx for UTI with scant hematuria Treated with 10 day course of Bactrim DS, completed yesterday    Ankylosing spondylitis 5/31/2022    Patient takes immunosuppression - recently changed to Cimzia, Taltz Followed by Dr. Medel for this    Back pain     Elevated prostate specific antigen (PSA) 8/12/2022    Lab Results Component Value Date  PSA 7.600 (H) 08/10/2022  PSA 8.580 (H) 07/15/2022 s/p month of antx for tx of acute exacerbation of chronic prostatitis    Neck pain     Spondylosis        Past  Surgical History:   Procedure Laterality Date    CARPAL TUNNEL RELEASE Left 12/13/2023    Procedure: RELEASE, CARPAL TUNNEL;  Surgeon: Jason Pelayo III, MD;  Location: [facility];  Service: Orthopedics;  Laterality: Left;    CYST REMOVAL Right     shoulder    HERNIA REPAIR      LUMBAR DISCECTOMY  02/2018    L 4 L5    TONSILLECTOMY         Social History  Mr. Alda Gamboa  reports that he has quit smoking. He has been exposed to tobacco smoke. He has never used smokeless tobacco. He reports that he does not currently use alcohol. He reports that he does not currently use drugs.    Family History  Mr.'s Alda Gamboa family history includes Cancer in his maternal grandfather; Crohn's disease in his maternal cousin; Lung cancer in his father; Pacemaker/defibrilator in his mother.    Medications and Allergies     Medications  No outpatient medications have been marked as taking for the 12/20/23 encounter (Office Visit) with Anuradha Cruz MD.       Allergies  Review of patient's allergies indicates:   Allergen Reactions    Pcn [penicillins] Hives and Swelling     Throat swelling    Sulfasalazine      Other reaction(s): Unknown    Gabapentin Nausea Only and Rash     Other reaction(s): Unknown       Physical Examination   There were no vitals filed for this visit.  Physical Exam  Constitutional:       Appearance: He is obese.   HENT:      Head: Normocephalic and atraumatic.      Right Ear: External ear normal.      Left Ear: External ear normal.      Nose: Nose normal. No congestion or rhinorrhea.   Eyes:      Extraocular Movements: Extraocular movements intact.      Pupils: Pupils are equal, round, and reactive to light.   Cardiovascular:      Pulses: Normal pulses.   Pulmonary:      Effort: Pulmonary effort is normal.      Breath sounds: Normal breath sounds. No wheezing.   Chest:      Chest wall: No tenderness.   Musculoskeletal:         General: Swelling and tenderness present. Normal  range of motion.      Cervical back: No rigidity or tenderness.      Comments: Left wrist with carpal tunnel post -surgical scar examined. Looks clean  Right ++ tinel sign   Lymphadenopathy:      Cervical: No cervical adenopathy.   Skin:     Findings: No rash.   Neurological:      General: No focal deficit present.      Mental Status: He is alert and oriented to person, place, and time.   Psychiatric:         Mood and Affect: Mood normal.         Thought Content: Thought content normal.              Signature:  Anuradha Cruz MD  [facility]  [address] [place] [id number]  [phone]    Date of encounter: 12/20/23

## 2023-12-20 NOTE — PROCEDURES
Carpal Tunnel    Date/Time: 12/20/2023 1:15 PM    Performed by: Meghna Arenas MD  Authorized by: Meghna Arenas MD    Consent Done?:  Yes (Verbal)  Indications:  Joint swelling and pain  Site marked: the procedure site was marked    Timeout: prior to procedure the correct patient, procedure, and site was verified    Local anesthesia used?: Yes    Anesthesia:  Local infiltration  Local anesthetic:  Lidocaine 2% without epinephrine  Location:  Wrist  Site:  R radiocarpal  Ultrasonic Guidance for Needle Placement?: No    Needle size:  24 G  Approach:  Posterolateral  Medications:  40 mg methylPREDNISolone acetate 40 mg/mL  Patient tolerance:  Patient tolerated the procedure well with no immediate complications      MEGHNA ARENAS MD FACP FACR

## 2023-12-20 NOTE — PROGRESS NOTES
Population Health Chart Review & Patient Outreach Details      Further Action Needed If Patient Returns Outreach:            Updates Requested / Reviewed:     []  Care Everywhere    []     []  External Sources ([facility], [facility], DIS, etc.)    [] [facility]   [] [facility]   [] Other:    [x]  Care Team Updated   []  Removed  or Duplicate Orders   []  Immunization Reconciliation Completed / Queried    [] [place]   [] [place]   [] [place]   [] [place]      Health Maintenance Topics Addressed and Outreach Outcomes / Actions Taken:             Breast Cancer Screening []  Mammogram Order Placed    []  Mammogram Screening Scheduled    []  External Records Requested & Care Team Updated if Applicable    []  External Records Uploaded & Care Team Updated if Applicable    []  Pt Declined Scheduling Mammogram    []  Pt Will Schedule with External Provider / Order Routed & Care Team Updated if Applicable              Cervical Cancer Screening []  Pap Smear Scheduled in Primary Care or OBGYN    []  External Records Requested & Care Team Updated if Applicable       []  External Records Uploaded, Care Team Updated, & History Updated if Applicable    []  Patient Declined Scheduling Pap Smear    []  Patient Will Schedule with External Provider & Care Team Updated if Applicable                  Colorectal Cancer Screening []  Colonoscopy Case Request / Referral / Home Test Order Placed    []  External Records Requested & Care Team Updated if Applicable    []  External Records Uploaded, Care Team Updated, & History Updated if Applicable    []  Patient Declined Completing Colon Cancer Screening    []  Patient Will Schedule with External Provider & Care Team Updated if Applicable    []  Fit Kit Mailed (add the SmartPhrase under additional notes)    []  Reminded Patient to Complete Home Test                Diabetic Eye Exam []  Eye Exam Screening Order Placed    []  Eye Camera Scheduled or Optometry/Ophthalmology Referral  Placed    []  External Records Requested & Care Team Updated if Applicable    []  External Records Uploaded, Care Team Updated, & History Updated if Applicable    []  Patient Declined Scheduling Eye Exam    []  Patient Will Schedule with External Provider & Care Team Updated if Applicable             Blood Pressure Control []  Primary Care Follow Up Visit Scheduled     []  Remote Blood Pressure Reading Captured    []  Patient Declined Remote Reading or Scheduling Appt - Escalated to PCP    []  Patient Will Call Back or Send Portal Message with Reading                 HbA1c & Other Labs []  Overdue Lab(s) Ordered    []  Overdue Lab(s) Scheduled    []  External Records Uploaded & Care Team Updated if Applicable    []  Primary Care Follow Up Visit Scheduled     []  Reminded Patient to Complete A1c Home Test    []  Patient Declined Scheduling Labs or Will Call Back to Schedule    []  Patient Will Schedule with External Provider / Order Routed, & Care Team Updated if Applicable           Primary Care Appointment []  Primary Care Appt Scheduled    []  Patient Declined Scheduling or Will Call Back to Schedule    []  Pt Established with External Provider, Updated Care Team, & Informed Pt to Notify Payor if Applicable           Medication Adherence /    Statin Use []  Primary Care Appointment Scheduled    []  Patient Reminded to  Prescription    []  Patient Declined, Provider Notified if Needed    []  Sent Provider Message to Review to Evaluate Pt for Statin, Add Exclusion Dx Codes, Document   Exclusion in Problem List, Change Statin Intensity Level to Moderate or High Intensity if Applicable                Osteoporosis Screening []  Dexa Order Placed    []  Dexa Appointment Scheduled    []  External Records Requested & Care Team Updated    []  External Records Uploaded, Care Team Updated, & History Updated if Applicable    []  Patient Declined Scheduling Dexa or Will Call Back to Schedule    []  Patient Will Schedule  with External Provider / Order Routed & Care Team Updated if Applicable       Additional Notes:.  Post visit Population Health review of encounter with date of service  12/19/23 with Thomas.  All required HY components in encounter.    Followup appt for: 12/23/2024 HY

## 2023-12-27 ENCOUNTER — OFFICE VISIT (OUTPATIENT)
Dept: ORTHOPEDICS | Facility: CLINIC | Age: 43
End: 2023-12-27
Payer: COMMERCIAL

## 2023-12-27 DIAGNOSIS — Z98.890 S/P CARPAL TUNNEL RELEASE: Primary | ICD-10-CM

## 2023-12-27 PROCEDURE — 99213 OFFICE O/P EST LOW 20 MIN: CPT | Mod: PBBFAC | Performed by: NURSE PRACTITIONER

## 2023-12-27 PROCEDURE — 1159F MED LIST DOCD IN RCRD: CPT | Mod: ,,, | Performed by: NURSE PRACTITIONER

## 2023-12-27 PROCEDURE — 1159F PR MEDICATION LIST DOCUMENTED IN MEDICAL RECORD: ICD-10-PCS | Mod: ,,, | Performed by: NURSE PRACTITIONER

## 2023-12-27 PROCEDURE — 99024 POSTOP FOLLOW-UP VISIT: CPT | Mod: ,,, | Performed by: NURSE PRACTITIONER

## 2023-12-27 PROCEDURE — 3044F HG A1C LEVEL LT 7.0%: CPT | Mod: ,,, | Performed by: NURSE PRACTITIONER

## 2023-12-27 PROCEDURE — 1160F PR REVIEW ALL MEDS BY PRESCRIBER/CLIN PHARMACIST DOCUMENTED: ICD-10-PCS | Mod: ,,, | Performed by: NURSE PRACTITIONER

## 2023-12-27 PROCEDURE — 3044F PR MOST RECENT HEMOGLOBIN A1C LEVEL <7.0%: ICD-10-PCS | Mod: ,,, | Performed by: NURSE PRACTITIONER

## 2023-12-27 PROCEDURE — 99024 PR POST-OP FOLLOW-UP VISIT: ICD-10-PCS | Mod: ,,, | Performed by: NURSE PRACTITIONER

## 2023-12-27 PROCEDURE — 1160F RVW MEDS BY RX/DR IN RCRD: CPT | Mod: ,,, | Performed by: NURSE PRACTITIONER

## 2024-01-03 DIAGNOSIS — G56.03 CARPAL TUNNEL SYNDROME ON BOTH SIDES: Primary | ICD-10-CM

## 2024-01-10 ENCOUNTER — HOSPITAL ENCOUNTER (OUTPATIENT)
Dept: GASTROENTEROLOGY | Facility: HOSPITAL | Age: 44
Discharge: HOME | End: 2024-01-10
Attending: FAMILY MEDICINE | Admitting: INTERNAL MEDICINE
Payer: COMMERCIAL

## 2024-01-10 ENCOUNTER — ANESTHESIA EVENT (OUTPATIENT)
Dept: GASTROENTEROLOGY | Facility: HOSPITAL | Age: 44
End: 2024-01-10
Payer: COMMERCIAL

## 2024-01-10 ENCOUNTER — ANESTHESIA (OUTPATIENT)
Dept: GASTROENTEROLOGY | Facility: HOSPITAL | Age: 44
End: 2024-01-10
Payer: COMMERCIAL

## 2024-01-10 VITALS
SYSTOLIC BLOOD PRESSURE: 102 MMHG | OXYGEN SATURATION: 100 % | BODY MASS INDEX: 23.73 KG/M2 | WEIGHT: 175 LBS | RESPIRATION RATE: 15 BRPM | DIASTOLIC BLOOD PRESSURE: 64 MMHG | HEART RATE: 72 BPM | TEMPERATURE: 97 F

## 2024-01-10 DIAGNOSIS — Z12.11 COLON CANCER SCREENING: ICD-10-CM

## 2024-01-10 DIAGNOSIS — R13.10 DYSPHAGIA, UNSPECIFIED TYPE: Primary | ICD-10-CM

## 2024-01-10 PROCEDURE — 37000008 HC ANESTHESIA 1ST 15 MINUTES

## 2024-01-10 PROCEDURE — G0121 COLON CA SCRN NOT HI RSK IND: HCPCS | Performed by: INTERNAL MEDICINE

## 2024-01-10 PROCEDURE — 25000003 PHARM REV CODE 250: Performed by: NURSE ANESTHETIST, CERTIFIED REGISTERED

## 2024-01-10 PROCEDURE — 37000009 HC ANESTHESIA EA ADD 15 MINS

## 2024-01-10 PROCEDURE — G0121 COLON CA SCRN NOT HI RSK IND: HCPCS | Mod: ,,, | Performed by: INTERNAL MEDICINE

## 2024-01-10 PROCEDURE — 27000284 HC CANNULA NASAL: Performed by: NURSE ANESTHETIST, CERTIFIED REGISTERED

## 2024-01-10 PROCEDURE — D9220A PRA ANESTHESIA: Mod: ,,, | Performed by: NURSE ANESTHETIST, CERTIFIED REGISTERED

## 2024-01-10 RX ORDER — LIDOCAINE HYDROCHLORIDE 20 MG/ML
INJECTION, SOLUTION EPIDURAL; INFILTRATION; INTRACAUDAL; PERINEURAL
Status: DISCONTINUED | OUTPATIENT
Start: 2024-01-10 | End: 2024-01-10

## 2024-01-10 RX ORDER — PROPOFOL 10 MG/ML
VIAL (ML) INTRAVENOUS
Status: DISCONTINUED | OUTPATIENT
Start: 2024-01-10 | End: 2024-01-10

## 2024-01-10 RX ORDER — SODIUM CHLORIDE, SODIUM LACTATE, POTASSIUM CHLORIDE, CALCIUM CHLORIDE 600; 310; 30; 20 MG/100ML; MG/100ML; MG/100ML; MG/100ML
INJECTION, SOLUTION INTRAVENOUS CONTINUOUS
Status: DISCONTINUED | OUTPATIENT
Start: 2024-01-10 | End: 2024-01-11 | Stop reason: HOSPADM

## 2024-01-10 RX ORDER — SODIUM CHLORIDE 0.9 % (FLUSH) 0.9 %
10 SYRINGE (ML) INJECTION EVERY 6 HOURS PRN
Status: DISCONTINUED | OUTPATIENT
Start: 2024-01-10 | End: 2024-01-11 | Stop reason: HOSPADM

## 2024-01-10 RX ADMIN — Medication 25 MG: at 09:01

## 2024-01-10 RX ADMIN — SODIUM CHLORIDE: 9 INJECTION, SOLUTION INTRAVENOUS at 09:01

## 2024-01-10 RX ADMIN — LIDOCAINE HYDROCHLORIDE 50 MG: 20 INJECTION, SOLUTION INTRAVENOUS at 09:01

## 2024-01-10 NOTE — H&P
History & Physical - Short Stay  Gastroenterology      SUBJECTIVE:     Procedure: Colonoscopy    Chief Complaint/Indication for Procedure: Screening    (Not in a hospital admission)    Review of patient's allergies indicates:   Allergen Reactions    Pcn [penicillins] Hives and Swelling     Throat swelling    Sulfasalazine      Other reaction(s): Unknown    Gabapentin Nausea Only and Rash     Other reaction(s): Unknown      Past Medical History:   Diagnosis Date    Acute bacterial prostatitis 5/31/2022    Positive Cx for UTI with scant hematuria Treated with 10 day course of Bactrim DS, completed yesterday    Ankylosing spondylitis 5/31/2022    Patient takes immunosuppression - recently changed to Cimzia, Taltz Followed by Dr. Medel for this    Back pain     Elevated prostate specific antigen (PSA) 8/12/2022    Lab Results Component Value Date  PSA 7.600 (H) 08/10/2022  PSA 8.580 (H) 07/15/2022 s/p month of antx for tx of acute exacerbation of chronic prostatitis    Neck pain     Spondylosis      Past Surgical History:   Procedure Laterality Date    CARPAL TUNNEL RELEASE Left 12/13/2023    Procedure: RELEASE, CARPAL TUNNEL;  Surgeon: Jason Pelayo III, MD;  Location: [facility];  Service: Orthopedics;  Laterality: Left;    CYST REMOVAL Right     shoulder    HERNIA REPAIR      LUMBAR DISCECTOMY  02/2018    L 4 L5    TONSILLECTOMY       Family History   Problem Relation Age of Onset    Pacemaker/defibrilator Mother     Lung cancer Father     Cancer Maternal Grandfather     Crohn's disease Maternal Cousin      Social History     Tobacco Use    Smoking status: Former     Passive exposure: Past    Smokeless tobacco: Never    Tobacco comments:     quit smoking at age 26, started at 12   Substance Use Topics    Alcohol use: Not Currently    Drug use: Not Currently     OBJECTIVE:     Physical Exam:                                                       GENERAL:  Comfortable, in no acute distress.                                  HEENT EXAM:  Nonicteric.  No adenopathy.  Oropharynx is clear.               NECK:  Supple.                                                               LUNGS:  Clear.                                                               CARDIAC:  Regular rate and rhythm.  S1, S2.  No murmur.                      ABDOMEN:  Soft, positive bowel sounds, nontender.  No hepatosplenomegaly or masses.  No rebound or guarding.                                             EXTREMITIES:  No edema.     MENTAL STATUS:  Normal, alert and oriented.      ASSESSMENT/PLAN:     Assessment: Screening    Plan: Colonoscopy

## 2024-01-10 NOTE — ANESTHESIA PREPROCEDURE EVALUATION
01/10/2024  Alda Gamboa is a 43 y.o., male.    Past Medical History:   Diagnosis Date    Acute bacterial prostatitis 5/31/2022    Positive Cx for UTI with scant hematuria Treated with 10 day course of Bactrim DS, completed yesterday    Ankylosing spondylitis 5/31/2022    Patient takes immunosuppression - recently changed to Cimzia, Taltz Followed by Dr. Medel for this    Back pain     Elevated prostate specific antigen (PSA) 8/12/2022    Lab Results Component Value Date  PSA 7.600 (H) 08/10/2022  PSA 8.580 (H) 07/15/2022 s/p month of antx for tx of acute exacerbation of chronic prostatitis    Neck pain     Spondylosis        Past Surgical History:   Procedure Laterality Date    CARPAL TUNNEL RELEASE Left 12/13/2023    Procedure: RELEASE, CARPAL TUNNEL;  Surgeon: Jason Pelayo III, MD;  Location: [facility];  Service: Orthopedics;  Laterality: Left;    CYST REMOVAL Right     shoulder    HERNIA REPAIR      LUMBAR DISCECTOMY  02/2018    L 4 L5    TONSILLECTOMY         Family History   Problem Relation Age of Onset    Pacemaker/defibrilator Mother     Lung cancer Father     Cancer Maternal Grandfather     Crohn's disease Maternal Cousin        Social History     Socioeconomic History    Marital status:    Tobacco Use    Smoking status: Former     Passive exposure: Past    Smokeless tobacco: Never    Tobacco comments:     quit smoking at age 26, started at 12   Substance and Sexual Activity    Alcohol use: Not Currently    Drug use: Not Currently    Sexual activity: Yes   Social History Narrative    ** Merged History Encounter **            Current Outpatient Medications   Medication Sig Dispense Refill    clonazePAM (KLONOPIN) 1 MG tablet Take 1 tablet (1 mg total) by mouth 2 (two) times daily as needed for Anxiety. (Patient not taking: Reported on 12/20/2023) 30 tablet 2    cyanocobalamin  1,000 mcg/mL injection Inject 1 mL (1,000 mcg total) into the muscle every 30 days. 1 mL 11    cyclobenzaprine (FLEXERIL) 10 MG tablet Take 1 tablet (10 mg total) by mouth every evening. 30 tablet 11    diclofenac (VOLTAREN) 75 MG EC tablet Take 1 tablet (75 mg total) by mouth 2 (two) times daily as needed (as needed). 90 tablet 3    ergocalciferol (ERGOCALCIFEROL) 50,000 unit Cap Take 1 capsule (50,000 Units total) by mouth every 7 days. (Patient not taking: Reported on 12/20/2023) 4 capsule 2    HYDROcodone-acetaminophen (NORCO) 5-325 mg per tablet Take 1 tablet by mouth every 6 (six) hours as needed for Pain. (Patient not taking: Reported on 12/20/2023) 20 tablet 0    ixekizumab (TALTZ AUTOINJECTOR) 80 mg/mL AtIn Inject 80 mg into the skin every 28 days. 1 mL 6    pantoprazole (PROTONIX) 40 MG tablet Take 1 tablet (40 mg total) by mouth once daily. 90 tablet 3    pregabalin (LYRICA) 25 MG capsule Take 1 capsule (25 mg total) by mouth 3 (three) times daily. (Patient not taking: Reported on 12/20/2023) 90 capsule 6    sertraline (ZOLOFT) 100 MG tablet 1/2 tab po q AM x 7 days, the 1 tab po daily 90 tablet 3    tadalafiL (CIALIS) 20 MG Tab Take 1 tablet (20 mg total) by mouth once daily. 10 tablet 11    testosterone (TESTIM) 50 mg/5 gram (1 %) Gel Apply 2-3 drops topically once daily. 2 each 2     No current facility-administered medications for this encounter.       Review of patient's allergies indicates:   Allergen Reactions    Pcn [penicillins] Hives and Swelling     Throat swelling    Sulfasalazine      Other reaction(s): Unknown    Gabapentin Nausea Only and Rash     Other reaction(s): Unknown       Pre-op Assessment    I have reviewed the Patient Summary Reports.     I have reviewed the Nursing Notes. I have reviewed the NPO Status.   I have reviewed the Medications.     Review of Systems  Anesthesia Hx:  No problems with previous Anesthesia             Denies Family Hx of Anesthesia complications.    Denies  Personal Hx of Anesthesia complications.                    Hematology/Oncology:    Oncology Normal                                   EENT/Dental:  EENT/Dental Normal           Cardiovascular:                hyperlipidemia                             Renal/:  Renal/ Normal                 Hepatic/GI:  Bowel Prep.                Musculoskeletal:  Arthritis               Neurological:    Neuromuscular Disease,                                   Dermatological:  Skin Normal    Psych:  Psychiatric Normal                    Physical Exam  General: Well nourished, Alert, Oriented and Cooperative    Airway:  Mouth Opening: Normal  Neck ROM: Normal ROM    Chest/Lungs:  Normal Respiratory Rate    Heart:  Rate: Normal        Anesthesia Plan  Type of Anesthesia, risks & benefits discussed:    Anesthesia Type: Gen Natural Airway, MAC  Intra-op Monitoring Plan: Standard ASA Monitors  Post Op Pain Control Plan: multimodal analgesia and IV/PO Opioids PRN  Induction:  IV  Informed Consent: Informed consent signed with the Patient and all parties understand the risks and agree with anesthesia plan.  All questions answered. Patient consented to blood products? Yes  ASA Score: 2  Day of Surgery Review of History & Physical: H&P Update referred to the surgeon/provider.I have interviewed and examined the patient. I have reviewed the patient's H&P dated: There are no significant changes.     Ready For Surgery From Anesthesia Perspective.     .

## 2024-01-10 NOTE — DISCHARGE INSTRUCTIONS
Procedure Date  1/10/24     Impression  Overall Impression:   The terminal ileum, cecum, ascending colon, transverse colon, descending colon, sigmoid colon and rectum appeared normal.     Recommendation  - Repeat screening colonoscopy in 10 years  - Discharge patient to home  - Advance diet as tolerated  - Continue present medications    - Patient has a contact number available for emergencies. The signs and symptoms of potential delayed complications were discussed with the patient. Return to normal activities tomorrow. Written discharge instructions were provided to the patient.     Indication  Colon cancer screening    NO DRIVING, OPERATING EQUIPMENT, OR SIGNING LEGAL DOCUMENTS FOR 24 HOURS.

## 2024-01-10 NOTE — TRANSFER OF CARE
Anesthesia Transfer of Care Note    Patient: Alda Gamboa    Procedure(s) Performed: Colonoscopy    Patient location: PACU    Transport from OR: Transported from OR on room air with adequate spontaneous ventilation. Continuous ECG monitoring in transport. Continuous SpO2 monitoring in transport    Post pain: adequate analgesia    Post assessment: no apparent anesthetic complications    Post vital signs: stable    Level of consciousness: responds to stimulation    Nausea/Vomiting: no nausea/vomiting    Complications: none    Transfer of care protocol was followed      Last vitals: Visit Vitals  /69 (Patient Position: Lying)   Pulse 73   Temp 36.1 °C (97 °F) (Skin)   Resp 10   Wt 79.4 kg (175 lb)   SpO2 100%   BMI 23.73 kg/m²

## 2024-01-10 NOTE — ANESTHESIA POSTPROCEDURE EVALUATION
Anesthesia Post Evaluation    Patient: Alda Gamboa    Procedure(s) Performed: Colonoscopy    Final Anesthesia Type: general      Patient location during evaluation: GI PACU  Patient participation: Yes- Able to Participate  Level of consciousness: awake and alert  Post-procedure vital signs: reviewed and stable  Pain management: adequate  Airway patency: patent    PONV status at discharge: No PONV  Anesthetic complications: no      Cardiovascular status: blood pressure returned to baseline and hemodynamically stable  Respiratory status: spontaneous ventilation, unassisted and room air  Hydration status: euvolemic  Follow-up not needed.              Vitals Value Taken Time   /64 01/10/24 1010   Temp 36.1 °C (97 °F) 01/10/24 0947   Pulse 66 01/10/24 1010   Resp 14 01/10/24 1010   SpO2 100 % 01/10/24 1010   Vitals shown include unvalidated device data.      No case tracking events are documented in the log.      Pain/Sam Score: Sam Score: 10 (1/10/2024 10:10 AM)

## 2024-01-16 PROBLEM — Z12.11 COLON CANCER SCREENING: Status: ACTIVE | Noted: 2024-01-16

## 2024-01-17 ENCOUNTER — PATIENT MESSAGE (OUTPATIENT)
Dept: RHEUMATOLOGY | Facility: CLINIC | Age: 44
End: 2024-01-17
Payer: COMMERCIAL

## 2024-01-17 ENCOUNTER — OFFICE VISIT (OUTPATIENT)
Dept: RHEUMATOLOGY | Facility: CLINIC | Age: 44
End: 2024-01-17
Payer: COMMERCIAL

## 2024-01-17 DIAGNOSIS — G56.01 CARPAL TUNNEL SYNDROME ON RIGHT: Primary | ICD-10-CM

## 2024-01-17 PROCEDURE — 99213 OFFICE O/P EST LOW 20 MIN: CPT | Mod: 95,,, | Performed by: INTERNAL MEDICINE

## 2024-01-17 NOTE — PROGRESS NOTES
Anuradha Cruz MD   [facility]  [address] [facility] [place] [address]  [phone]      PATIENT NAME: Alda Gamboa  : 1980  DATE: 24  MRN: [id number]      Billing Provider: Anuradha Cruz MD  Level of Service:   Patient PCP Information       Provider PCP Type    [place] MD General          The patient location is: home  The chief complaint leading to consultation is: carpal tunnel     Visit type: audio only    Face to Face time with patient:    30  minutes of total time spent on the encounter, which includes face to face time and non-face to face time preparing to see the patient (eg, review of tests), Obtaining and/or reviewing separately obtained history, Documenting clinical information in the electronic or other health record, Independently interpreting results (not separately reported) and communicating results to the patient/family/caregiver, or Care coordination (not separately reported).         Each patient to whom he or she provides medical services by telemedicine is:  (1) informed of the relationship between the physician and patient and the respective role of any other health care provider with respect to management of the patient; and (2) notified that he or she may decline to receive medical services by telemedicine and may withdraw from such care at any time.    Notes:         Assessment and Plan (including Health Maintenance)      Problem List  Smart Sets  Document Outside HM   :    Plan:     Has started testosterone gel   Also using Taltz now  Prior biologics have helped as well but had presented with wrist flare   However states that his hands and knuckles are still painful [but 'is pulling them out' ].   Tingling in hands -sees neurology for it. Dx with carpal tunnel syndrome. Arms go numb as well.   Per wife - is only able to work 2 hours at a time. Has lost 2 jobs this year due to lack of productivity. Has been  working since age 13 and has only lost 5 weeks of work since age 13. 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 Is working as a  now.   Fatigue is likely related to Lyrica. But if he stops it -then the tingling worsening. My advice is to take time off work to start weaning off Klonipin [ may be contributing to fatigue] and to allow him respite time to also developed either a new trade or get the rehab that he needs.   I can refer him to [facility] therapy as well.     12/20/2023;  Dx with carpal tunnel - sx ongiong for 'years'  Recently had CTS surgery to left hand  Still has ongoing symptoms in right hand  Will also refer to OT today   Right CTS IAS injection today.         1/17/2024;  Seems to have responded well to local carpal tunnel injection to R wrist   States pain is better.   Recommend starting hand OT as soon as cleared by Dr. Pelayo.       Total time spent in Assessment, Co-ordination of Care , Review of Care Plan , Goal Setting and Counseling on this initial visit is 60 minutes     There is currently no information documented on the homunculus. Go to the Rheumatology activity and complete the homunculus joint exam.               History of Present Illness / Problem Focused Workflow     Alda Gamboa presents to the clinic with No chief complaint on file.     Has been living with back pain since his teens. Has been assessed by chiropractor in the earlier years who was the first to suspect a systemic inflammatory syndrome. Was then referred to Dr. Medel and was then subsequently attached to Rheumatology at [place].   Humira was started by Dr. Cano in [place]. Was on mtx previousy which really upset his stomach.   Also tried and failed cimzia.   Has been on Taltz for a year.   Also takes diclofenac twice daily   Works as an   Physical fatigue +   Kinesthetic person.   Lower extremity radiculopathy.   Also known to have low testosterone and was on replacement - developed prostatitis off testosterone.        Review of Systems     Review of Systems   Constitutional:  Negative for fever and unexpected weight change.   HENT:  Positive for mouth sores and trouble swallowing.    Eyes:  Negative for redness.   Respiratory:  Positive for shortness of breath. Negative for cough.    Cardiovascular:  Positive for chest pain.   Gastrointestinal:  Negative for constipation and diarrhea.   Genitourinary:  Negative for genital sores.   Skin:  Negative for rash.   Neurological:  Positive for headaches.   Hematological:  Does not bruise/bleed easily.       Medical / Social / Family History     Past Medical History:   Diagnosis Date    Acute bacterial prostatitis 5/31/2022    Positive Cx for UTI with scant hematuria Treated with 10 day course of Bactrim DS, completed yesterday    Ankylosing spondylitis 5/31/2022    Patient takes immunosuppression - recently changed to Cimzia, Taltz Followed by Dr. Medel for this    Back pain     Elevated prostate specific antigen (PSA) 8/12/2022    Lab Results Component Value Date  PSA 7.600 (H) 08/10/2022  PSA 8.580 (H) 07/15/2022 s/p month of antx for tx of acute exacerbation of chronic prostatitis    Neck pain     Spondylosis        Past Surgical History:   Procedure Laterality Date    CARPAL TUNNEL RELEASE Left 12/13/2023    Procedure: RELEASE, CARPAL TUNNEL;  Surgeon: Jason Pelayo III, MD;  Location: [facility];  Service: Orthopedics;  Laterality: Left;    CYST REMOVAL Right     shoulder    HERNIA REPAIR      LUMBAR DISCECTOMY  02/2018    L 4 L5    TONSILLECTOMY         Social History  Mr. Alda Gamboa  reports that he has quit smoking. He has been exposed to tobacco smoke. He has never used smokeless tobacco. He reports that he does not currently use alcohol. He reports that he does not currently use drugs.    Family History  'hiram Gamboa family history includes Cancer in his maternal grandfather; Crohn's disease in his maternal cousin; Lung cancer in his father;  Pacemaker/defibrilator in his mother.    Medications and Allergies     Medications  No outpatient medications have been marked as taking for the 1/17/24 encounter (Appointment) with Anuradha Cruz MD.       Allergies  Review of patient's allergies indicates:   Allergen Reactions    Pcn [penicillins] Hives and Swelling     Throat swelling    Sulfasalazine      Other reaction(s): Unknown    Gabapentin Nausea Only and Rash     Other reaction(s): Unknown       Physical Examination   There were no vitals filed for this visit.  Physical Exam  Constitutional:       Appearance: He is obese.   HENT:      Head: Normocephalic and atraumatic.      Right Ear: External ear normal.      Left Ear: External ear normal.      Nose: Nose normal. No congestion or rhinorrhea.   Eyes:      Extraocular Movements: Extraocular movements intact.      Pupils: Pupils are equal, round, and reactive to light.   Cardiovascular:      Pulses: Normal pulses.   Pulmonary:      Effort: Pulmonary effort is normal.      Breath sounds: Normal breath sounds. No wheezing.   Chest:      Chest wall: No tenderness.   Musculoskeletal:         General: Swelling and tenderness present. Normal range of motion.      Cervical back: No rigidity or tenderness.      Comments: Left wrist with carpal tunnel post -surgical scar examined. Looks clean  Right ++ tinel sign   Lymphadenopathy:      Cervical: No cervical adenopathy.   Skin:     Findings: No rash.   Neurological:      General: No focal deficit present.      Mental Status: He is alert and oriented to person, place, and time.   Psychiatric:         Mood and Affect: Mood normal.         Thought Content: Thought content normal.                Signature:  Anuradha Cruz MD  [facility]  [address] [place] [id number]  [phone]    Date of encounter: 1/17/24

## 2024-01-25 ENCOUNTER — HOSPITAL ENCOUNTER (EMERGENCY)
Facility: HOSPITAL | Age: 44
Discharge: HOME | End: 2024-01-25
Payer: COMMERCIAL

## 2024-01-25 VITALS
SYSTOLIC BLOOD PRESSURE: 136 MMHG | HEIGHT: 72 IN | RESPIRATION RATE: 16 BRPM | TEMPERATURE: 98 F | OXYGEN SATURATION: 100 % | BODY MASS INDEX: 23.03 KG/M2 | DIASTOLIC BLOOD PRESSURE: 86 MMHG | HEART RATE: 72 BPM | WEIGHT: 170 LBS

## 2024-01-25 DIAGNOSIS — T14.90XA INJURY: ICD-10-CM

## 2024-01-25 DIAGNOSIS — S63.501A SPRAIN OF RIGHT WRIST, INITIAL ENCOUNTER: Primary | ICD-10-CM

## 2024-01-25 PROCEDURE — 63600175 PHARM REV CODE 636 W HCPCS: Performed by: NURSE PRACTITIONER

## 2024-01-25 PROCEDURE — 96372 THER/PROPH/DIAG INJ SC/IM: CPT | Performed by: NURSE PRACTITIONER

## 2024-01-25 PROCEDURE — 29125 APPL SHORT ARM SPLINT STATIC: CPT | Mod: RT

## 2024-01-25 PROCEDURE — 99284 EMERGENCY DEPT VISIT MOD MDM: CPT | Mod: ,,, | Performed by: NURSE PRACTITIONER

## 2024-01-25 PROCEDURE — 99284 EMERGENCY DEPT VISIT MOD MDM: CPT

## 2024-01-25 RX ORDER — MORPHINE SULFATE 4 MG/ML
4 INJECTION, SOLUTION INTRAMUSCULAR; INTRAVENOUS
Status: COMPLETED | OUTPATIENT
Start: 2024-01-25 | End: 2024-01-25

## 2024-01-25 RX ORDER — DEXAMETHASONE SODIUM PHOSPHATE 4 MG/ML
4 INJECTION, SOLUTION INTRA-ARTICULAR; INTRALESIONAL; INTRAMUSCULAR; INTRAVENOUS; SOFT TISSUE
Status: COMPLETED | OUTPATIENT
Start: 2024-01-25 | End: 2024-01-25

## 2024-01-25 RX ADMIN — DEXAMETHASONE SODIUM PHOSPHATE 4 MG: 4 INJECTION, SOLUTION INTRA-ARTICULAR; INTRALESIONAL; INTRAMUSCULAR; INTRAVENOUS; SOFT TISSUE at 02:01

## 2024-01-25 RX ADMIN — MORPHINE SULFATE 4 MG: 4 INJECTION, SOLUTION INTRAMUSCULAR; INTRAVENOUS at 02:01

## 2024-01-25 NOTE — Clinical Note
"Alda Kwanjayda Gamboa was seen and treated in our emergency department on 1/25/2024.  He may return to work on 01/26/2024.       If you have any questions or concerns, please don't hesitate to call.      Mary Watts, FNP"

## 2024-01-25 NOTE — ED PROVIDER NOTES
Encounter Date: 1/25/2024       History     Chief Complaint   Patient presents with    Wrist Injury     Was picking up something heavy and right wrist popped      43 year old male presents to ED with complaint of right wrist pain status post injury. Patient reports he was at work attempting to lift a truck fender/steel estimated to weigh 200lbs with his right hand due to his left hand recovering from carpal tunnel surgery 1 month ago. He states he felt a pop to his wrist and fell to the ground due to instant pain. Denies numbness/tingling.     The history is provided by the spouse and the patient.     Review of patient's allergies indicates:   Allergen Reactions    Pcn [penicillins] Hives and Swelling     Throat swelling    Sulfasalazine      Other reaction(s): Unknown    Gabapentin Nausea Only and Rash     Other reaction(s): Unknown     Past Medical History:   Diagnosis Date    Acute bacterial prostatitis 5/31/2022    Positive Cx for UTI with scant hematuria Treated with 10 day course of Bactrim DS, completed yesterday    Ankylosing spondylitis 5/31/2022    Patient takes immunosuppression - recently changed to Cimzia, Taltz Followed by Dr. Medel for this    Back pain     Elevated prostate specific antigen (PSA) 8/12/2022    Lab Results Component Value Date  PSA 7.600 (H) 08/10/2022  PSA 8.580 (H) 07/15/2022 s/p month of antx for tx of acute exacerbation of chronic prostatitis    Neck pain     Spondylosis      Past Surgical History:   Procedure Laterality Date    CARPAL TUNNEL RELEASE Left 12/13/2023    Procedure: RELEASE, CARPAL TUNNEL;  Surgeon: Jason Pelayo III, MD;  Location: [facility];  Service: Orthopedics;  Laterality: Left;    CYST REMOVAL Right     shoulder    HERNIA REPAIR      LUMBAR DISCECTOMY  02/2018    L 4 L5    TONSILLECTOMY       Family History   Problem Relation Age of Onset    Pacemaker/defibrilator Mother     Lung cancer Father     Cancer Maternal Grandfather     Crohn's disease  Maternal Cousin      Social History     Tobacco Use    Smoking status: Former     Passive exposure: Past    Smokeless tobacco: Never    Tobacco comments:     quit smoking at age 26, started at 12   Substance Use Topics    Alcohol use: Not Currently    Drug use: Not Currently     Review of Systems   Constitutional:  Negative for chills and fever.   HENT:  Negative for sinus pressure and sinus pain.    Eyes:  Negative for photophobia and visual disturbance.   Respiratory:  Negative for cough and shortness of breath.    Cardiovascular:  Negative for chest pain and palpitations.   Gastrointestinal:  Negative for nausea and vomiting.   Musculoskeletal:  Positive for arthralgias. Negative for myalgias.   Skin:  Negative for color change and wound.   Neurological:  Negative for weakness and numbness.   Hematological:  Negative for adenopathy. Does not bruise/bleed easily.   Psychiatric/Behavioral:  Negative for agitation and confusion.    All other systems reviewed and are negative.      Physical Exam     Initial Vitals [01/25/24 1325]   BP Pulse Resp Temp SpO2   136/86 72 16 97.8 °F (36.6 °C) 100 %      MAP       --         Physical Exam    Nursing note and vitals reviewed.  Constitutional: He appears well-developed and well-nourished.   HENT:   Head: Normocephalic and atraumatic.   Eyes: EOM are normal. Pupils are equal, round, and reactive to light.   Neck: Neck supple.   Normal range of motion.  Cardiovascular:  Normal rate and regular rhythm.           No murmur heard.  Pulmonary/Chest: He has no wheezes. He has no rhonchi.   Abdominal: Abdomen is soft. He exhibits no distension. There is no abdominal tenderness.   Musculoskeletal:         General: Tenderness present. No edema.      Right wrist: Tenderness present. Decreased range of motion.      Cervical back: Normal range of motion and neck supple.      Comments: Unable to gauge full ROM due to fear of pain     Lymphadenopathy:     He has no cervical adenopathy.    Neurological: He is alert and oriented to person, place, and time. No cranial nerve deficit or sensory deficit.   Skin: Skin is warm and dry. Capillary refill takes less than 2 seconds.   Psychiatric: He has a normal mood and affect. Thought content normal.         Medical Screening Exam   See Full Note    ED Course   Procedures  Labs Reviewed - No data to display       Imaging Results              X-Ray Wrist Complete Right (Final result)  Result time 01/25/24 13:49:52      Final result by Denny Michaels II, MD (01/25/24 13:49:52)                   Impression:      No evidence of acute injury demonstrated.      Electronically signed by: Denny Michaels  Date:    01/25/2024  Time:    13:49               Narrative:    EXAMINATION:  XR WRIST COMPLETE 3 VIEWS RIGHT    CLINICAL HISTORY:  Injury, unspecified, initial encounter    COMPARISON:  31 July 2018    TECHNIQUE:  XR WRIST 3 VIEWS RIGHT    FINDINGS:  No evidence of fracture seen.  The alignment of the joints appears normal.  Mild wrist degenerative change is present.  No soft tissue abnormality is seen.                                       Medications   morphine injection 4 mg (has no administration in time range)   dexAMETHasone injection 4 mg (has no administration in time range)     Medical Decision Making  43 year old male presents to ED with complaint of right wrist pain status post injury. Patient reports he was at work attempting to lift a truck fender/steel estimated to weigh 200lbs with his right hand due to his left hand recovering from carpal tunnel surgery 1 month ago. He states he felt a pop to his wrist and fell to the ground due to instant pain. Denies numbness/tingling.     Diagnostics obtained. IM Morphine, Decadron administered. Splint provided; ortho referral provided. Established patient with Dr. Pelayo    Amount and/or Complexity of Data Reviewed  Radiology: ordered.     Details: No acute processes    Risk  Prescription drug  management.                                      Clinical Impression:   Final diagnoses:  [T14.90XA] Injury  [S63.501A] Sprain of right wrist, initial encounter (Primary)        ED Disposition Condition    Discharge Stable          ED Prescriptions    None       Follow-up Information    None          Mary Watts, [facility]  01/25/24 1569

## 2024-01-26 ENCOUNTER — OFFICE VISIT (OUTPATIENT)
Dept: ORTHOPEDICS | Facility: CLINIC | Age: 44
End: 2024-01-26
Payer: COMMERCIAL

## 2024-01-26 DIAGNOSIS — Z09 FOLLOW-UP EXAMINATION, FOLLOWING OTHER SURGERY: Primary | ICD-10-CM

## 2024-01-26 DIAGNOSIS — S63.501A SPRAIN OF RIGHT WRIST, INITIAL ENCOUNTER: ICD-10-CM

## 2024-01-26 PROCEDURE — 99213 OFFICE O/P EST LOW 20 MIN: CPT | Mod: S$PBB,24,, | Performed by: ORTHOPAEDIC SURGERY

## 2024-01-26 PROCEDURE — 99024 POSTOP FOLLOW-UP VISIT: CPT | Mod: ,,, | Performed by: ORTHOPAEDIC SURGERY

## 2024-01-26 PROCEDURE — 99212 OFFICE O/P EST SF 10 MIN: CPT | Mod: PBBFAC | Performed by: ORTHOPAEDIC SURGERY

## 2024-01-26 NOTE — PROGRESS NOTES
CC:   Chief Complaint   Patient presents with    Follow-up     [facility] 12/13 (6WKS)        PREVIOUS INFO:        HISTORY:   1/26/2024    Alda Gamboa  is a 43 y.o. 6 weeks out left carpal tunnel release 12/13/2023  Patient was picking up the a frame to a vehicle just the frame yesterday with a right hand and had pain in his right wrist so severe that he went to the emergency room they did not have a fall or anything like that but he definitely had pain      PAST MEDICAL HISTORY:   Past Medical History:   Diagnosis Date    Acute bacterial prostatitis 5/31/2022    Positive Cx for UTI with scant hematuria Treated with 10 day course of Bactrim DS, completed yesterday    Ankylosing spondylitis 5/31/2022    Patient takes immunosuppression - recently changed to Cimzia, Taltz Followed by Dr. Medel for this    Back pain     Elevated prostate specific antigen (PSA) 8/12/2022    Lab Results Component Value Date  PSA 7.600 (H) 08/10/2022  PSA 8.580 (H) 07/15/2022 s/p month of antx for tx of acute exacerbation of chronic prostatitis    Neck pain     Spondylosis           PAST SURGICAL HISTORY:   Past Surgical History:   Procedure Laterality Date    CARPAL TUNNEL RELEASE Left 12/13/2023    Procedure: RELEASE, CARPAL TUNNEL;  Surgeon: Jason Pelayo III, MD;  Location: [facility];  Service: Orthopedics;  Laterality: Left;    CYST REMOVAL Right     shoulder    HERNIA REPAIR      LUMBAR DISCECTOMY  02/2018    L 4 L5    TONSILLECTOMY            ALLERGIES:   Review of patient's allergies indicates:   Allergen Reactions    Pcn [penicillins] Hives and Swelling     Throat swelling    Sulfasalazine      Other reaction(s): Unknown    Gabapentin Nausea Only and Rash     Other reaction(s): Unknown        MEDICATIONS :    Current Outpatient Medications:     clonazePAM (KLONOPIN) 1 MG tablet, Take 1 tablet (1 mg total) by mouth 2 (two) times daily as needed for Anxiety. (Patient not taking: Reported on 12/20/2023),  Disp: 30 tablet, Rfl: 2    cyanocobalamin 1,000 mcg/mL injection, Inject 1 mL (1,000 mcg total) into the muscle every 30 days., Disp: 1 mL, Rfl: 11    cyclobenzaprine (FLEXERIL) 10 MG tablet, Take 1 tablet (10 mg total) by mouth every evening., Disp: 30 tablet, Rfl: 11    diclofenac (VOLTAREN) 75 MG EC tablet, Take 1 tablet (75 mg total) by mouth 2 (two) times daily as needed (as needed)., Disp: 90 tablet, Rfl: 3    ergocalciferol (ERGOCALCIFEROL) 50,000 unit Cap, Take 1 capsule (50,000 Units total) by mouth every 7 days. (Patient not taking: Reported on 12/20/2023), Disp: 4 capsule, Rfl: 2    HYDROcodone-acetaminophen (NORCO) 5-325 mg per tablet, Take 1 tablet by mouth every 6 (six) hours as needed for Pain. (Patient not taking: Reported on 12/20/2023), Disp: 20 tablet, Rfl: 0    ixekizumab (TALTZ AUTOINJECTOR) 80 mg/mL AtIn, Inject 80 mg into the skin every 28 days., Disp: 1 mL, Rfl: 6    pantoprazole (PROTONIX) 40 MG tablet, Take 1 tablet (40 mg total) by mouth once daily., Disp: 90 tablet, Rfl: 3    pregabalin (LYRICA) 25 MG capsule, Take 1 capsule (25 mg total) by mouth 3 (three) times daily. (Patient not taking: Reported on 12/20/2023), Disp: 90 capsule, Rfl: 6    sertraline (ZOLOFT) 100 MG tablet, 1/2 tab po q AM x 7 days, the 1 tab po daily, Disp: 90 tablet, Rfl: 3    tadalafiL (CIALIS) 20 MG Tab, Take 1 tablet (20 mg total) by mouth once daily., Disp: 10 tablet, Rfl: 11    testosterone (TESTIM) 50 mg/5 gram (1 %) Gel, Apply 2-3 drops topically once daily., Disp: 2 each, Rfl: 2  No current facility-administered medications for this visit.     SOCIAL HISTORY:   Social History     Socioeconomic History    Marital status:    Tobacco Use    Smoking status: Former     Passive exposure: Past    Smokeless tobacco: Never    Tobacco comments:     quit smoking at age 26, started at 12   Substance and Sexual Activity    Alcohol use: Not Currently    Drug use: Not Currently    Sexual activity: Yes   Social  History Narrative    ** Merged History Encounter **             ROS    FAMILY HISTORY:   Family History   Problem Relation Age of Onset    Pacemaker/defibrilator Mother     Lung cancer Father     Cancer Maternal Grandfather     Crohn's disease Maternal Cousin           PHYSICAL EXAM: There were no vitals filed for this visit.            There is no height or weight on file to calculate BMI.     In general, this is a well-developed, well-nourished male . The patient is alert, oriented and cooperative.      HEENT:  Normocephalic, atraumatic.  Extraocular movements are intact bilaterally.  The oropharynx is benign.       NECK:  Nontender with good range of motion.      PULMONARY: Respirations are even and non-labored.       CARDIOVASCULAR: Pulses regular by peripheral palpation.       ABDOMEN:  Soft, non-tender, non-distended.        EXTREMITIES:  The left carpal tunnel the numbness and tingling is gone he still has tenderness at the incision I went over massage desensitization exercises and he says it already made it feel better  The right forearm has mild swelling he is actually decent motion he is tender some on the ulnar-sided some of the radial side of his wrist snuffbox is nontender there is no swelling over the carpal bones    Ortho Exam      RADIOGRAPHIC FINDINGS:  Right wrist x-rays from the emergency room show normal carpal alignment no obvious fracture appreciated      .      IMPRESSION:  Carpal tunnel numbness tingling gone he is got him massages incision on the left the right wrist he obvious had a strain at least to the right wrist negative set of x-rays told him to use the wrist brace the ER has not been we are going to recheck him in a week if still hurting I will get repeat wrist films with a clinched fist AP and ulnar deviation view in addition and consider an MRI    PLAN:  As above  There are no Patient Instructions on file for this visit.      No follow-ups on file.         Jason Pelayo  III      (Subject to voice recognition error, transcription service not allowed)

## 2024-01-31 ENCOUNTER — HOSPITAL ENCOUNTER (OUTPATIENT)
Dept: GASTROENTEROLOGY | Facility: HOSPITAL | Age: 44
Discharge: HOME | End: 2024-01-31
Attending: INTERNAL MEDICINE
Payer: COMMERCIAL

## 2024-01-31 ENCOUNTER — ANESTHESIA (OUTPATIENT)
Dept: GASTROENTEROLOGY | Facility: HOSPITAL | Age: 44
End: 2024-01-31
Payer: COMMERCIAL

## 2024-01-31 ENCOUNTER — ANESTHESIA EVENT (OUTPATIENT)
Dept: GASTROENTEROLOGY | Facility: HOSPITAL | Age: 44
End: 2024-01-31
Payer: COMMERCIAL

## 2024-01-31 VITALS
DIASTOLIC BLOOD PRESSURE: 79 MMHG | HEART RATE: 77 BPM | OXYGEN SATURATION: 97 % | TEMPERATURE: 97 F | RESPIRATION RATE: 15 BRPM | SYSTOLIC BLOOD PRESSURE: 123 MMHG

## 2024-01-31 DIAGNOSIS — R13.10 DYSPHAGIA, UNSPECIFIED TYPE: ICD-10-CM

## 2024-01-31 PROCEDURE — 43248 EGD GUIDE WIRE INSERTION: CPT | Mod: ,,, | Performed by: INTERNAL MEDICINE

## 2024-01-31 PROCEDURE — 88305 TISSUE EXAM BY PATHOLOGIST: CPT | Mod: TC,SUR | Performed by: INTERNAL MEDICINE

## 2024-01-31 PROCEDURE — 25000003 PHARM REV CODE 250: Performed by: NURSE ANESTHETIST, CERTIFIED REGISTERED

## 2024-01-31 PROCEDURE — 27201423 OPTIME MED/SURG SUP & DEVICES STERILE SUPPLY

## 2024-01-31 PROCEDURE — D9220A PRA ANESTHESIA: Mod: ,,, | Performed by: NURSE ANESTHETIST, CERTIFIED REGISTERED

## 2024-01-31 PROCEDURE — 43248 EGD GUIDE WIRE INSERTION: CPT | Performed by: INTERNAL MEDICINE

## 2024-01-31 PROCEDURE — 27000284 HC CANNULA NASAL: Performed by: NURSE ANESTHETIST, CERTIFIED REGISTERED

## 2024-01-31 PROCEDURE — 43239 EGD BIOPSY SINGLE/MULTIPLE: CPT | Mod: 59 | Performed by: INTERNAL MEDICINE

## 2024-01-31 PROCEDURE — 88305 TISSUE EXAM BY PATHOLOGIST: CPT | Mod: 26,,, | Performed by: PATHOLOGY

## 2024-01-31 PROCEDURE — 43239 EGD BIOPSY SINGLE/MULTIPLE: CPT | Mod: 59,,, | Performed by: INTERNAL MEDICINE

## 2024-01-31 PROCEDURE — 88342 IMHCHEM/IMCYTCHM 1ST ANTB: CPT | Mod: 26,,, | Performed by: PATHOLOGY

## 2024-01-31 PROCEDURE — 37000008 HC ANESTHESIA 1ST 15 MINUTES

## 2024-01-31 PROCEDURE — 63600175 PHARM REV CODE 636 W HCPCS: Performed by: NURSE ANESTHETIST, CERTIFIED REGISTERED

## 2024-01-31 RX ORDER — PROPOFOL 10 MG/ML
VIAL (ML) INTRAVENOUS
Status: DISCONTINUED | OUTPATIENT
Start: 2024-01-31 | End: 2024-01-31

## 2024-01-31 RX ORDER — SODIUM CHLORIDE 0.9 % (FLUSH) 0.9 %
10 SYRINGE (ML) INJECTION EVERY 6 HOURS PRN
Status: CANCELLED | OUTPATIENT
Start: 2024-01-31

## 2024-01-31 RX ORDER — FENTANYL CITRATE 50 UG/ML
INJECTION, SOLUTION INTRAMUSCULAR; INTRAVENOUS
Status: DISCONTINUED | OUTPATIENT
Start: 2024-01-31 | End: 2024-01-31

## 2024-01-31 RX ORDER — SODIUM CHLORIDE, SODIUM LACTATE, POTASSIUM CHLORIDE, CALCIUM CHLORIDE 600; 310; 30; 20 MG/100ML; MG/100ML; MG/100ML; MG/100ML
INJECTION, SOLUTION INTRAVENOUS CONTINUOUS
Status: CANCELLED | OUTPATIENT
Start: 2024-01-31

## 2024-01-31 RX ORDER — SODIUM CHLORIDE 9 MG/ML
INJECTION, SOLUTION INTRAVENOUS CONTINUOUS PRN
Status: DISCONTINUED | OUTPATIENT
Start: 2024-01-31 | End: 2024-01-31

## 2024-01-31 RX ORDER — LIDOCAINE HYDROCHLORIDE 20 MG/ML
INJECTION, SOLUTION EPIDURAL; INFILTRATION; INTRACAUDAL; PERINEURAL
Status: DISCONTINUED | OUTPATIENT
Start: 2024-01-31 | End: 2024-01-31

## 2024-01-31 RX ADMIN — PROPOFOL 50 MG: 10 INJECTION, EMULSION INTRAVENOUS at 08:01

## 2024-01-31 RX ADMIN — LIDOCAINE HYDROCHLORIDE 100 MG: 20 INJECTION, SOLUTION INTRAVENOUS at 08:01

## 2024-01-31 RX ADMIN — FENTANYL CITRATE 50 MCG: 50 INJECTION INTRAMUSCULAR; INTRAVENOUS at 08:01

## 2024-01-31 RX ADMIN — SODIUM CHLORIDE: 9 INJECTION, SOLUTION INTRAVENOUS at 08:01

## 2024-01-31 NOTE — ANESTHESIA PREPROCEDURE EVALUATION
01/31/2024  Alda Gamboa is a 43 y.o., male.    Social History     Socioeconomic History    Marital status:    Tobacco Use    Smoking status: Former     Passive exposure: Past    Smokeless tobacco: Never    Tobacco comments:     quit smoking at age 26, started at 12   Substance and Sexual Activity    Alcohol use: Not Currently    Drug use: Not Currently    Sexual activity: Yes   Social History Narrative    ** Merged History Encounter **           Pre-op Assessment    I have reviewed the Patient Summary Reports.     I have reviewed the Nursing Notes. I have reviewed the NPO Status.   I have reviewed the Medications.     Review of Systems  Anesthesia Hx:  No problems with previous Anesthesia                Social:  Former Smoker       Musculoskeletal:  Arthritis               Neurological:    Neuromuscular Disease,                                     Past Medical History:   Diagnosis Date    Acute bacterial prostatitis 5/31/2022    Positive Cx for UTI with scant hematuria Treated with 10 day course of Bactrim DS, completed yesterday    Ankylosing spondylitis 5/31/2022    Patient takes immunosuppression - recently changed to Cimzia, Taltz Followed by Dr. Medel for this    Back pain     Elevated prostate specific antigen (PSA) 8/12/2022    Lab Results Component Value Date  PSA 7.600 (H) 08/10/2022  PSA 8.580 (H) 07/15/2022 s/p month of antx for tx of acute exacerbation of chronic prostatitis    Neck pain     Spondylosis       Past Surgical History:   Procedure Laterality Date    CARPAL TUNNEL RELEASE Left 12/13/2023    Procedure: RELEASE, CARPAL TUNNEL;  Surgeon: Jason Pelayo III, MD;  Location: [facility];  Service: Orthopedics;  Laterality: Left;    CYST REMOVAL Right     shoulder    HERNIA REPAIR      LUMBAR DISCECTOMY  02/2018    L 4 L5    TONSILLECTOMY          Physical Exam  General:  Well nourished, Cooperative, Alert and Oriented    Airway:  Mallampati: II     Chest/Lungs:  Clear to auscultation    Heart:  Rate: Normal        Anesthesia Plan  Type of Anesthesia, risks & benefits discussed:    Anesthesia Type: Gen Natural Airway, MAC  Intra-op Monitoring Plan: Standard ASA Monitors  Post Op Pain Control Plan: multimodal analgesia and IV/PO Opioids PRN  Induction:  IV  Informed Consent: Informed consent signed with the Patient and all parties understand the risks and agree with anesthesia plan.  All questions answered. Patient consented to blood products? Yes  ASA Score: 2  Day of Surgery Review of History & Physical: I have interviewed and examined the patient. I have reviewed the patient's H&P dated: There are no significant changes.     Ready For Surgery From Anesthesia Perspective.     .

## 2024-01-31 NOTE — H&P
History & Physical - Short Stay  Gastroenterology      SUBJECTIVE:     Procedure: EGD    Chief Complaint/Indication for Procedure: Dysphagia    (Not in a hospital admission)      Review of patient's allergies indicates:   Allergen Reactions    Pcn [penicillins] Hives and Swelling     Throat swelling    Sulfasalazine      Other reaction(s): Unknown    Gabapentin Nausea Only and Rash     Other reaction(s): Unknown        Past Medical History:   Diagnosis Date    Acute bacterial prostatitis 5/31/2022    Positive Cx for UTI with scant hematuria Treated with 10 day course of Bactrim DS, completed yesterday    Ankylosing spondylitis 5/31/2022    Patient takes immunosuppression - recently changed to Cimzia, Taltz Followed by Dr. Medel for this    Back pain     Elevated prostate specific antigen (PSA) 8/12/2022    Lab Results Component Value Date  PSA 7.600 (H) 08/10/2022  PSA 8.580 (H) 07/15/2022 s/p month of antx for tx of acute exacerbation of chronic prostatitis    Neck pain     Spondylosis      Past Surgical History:   Procedure Laterality Date    CARPAL TUNNEL RELEASE Left 12/13/2023    Procedure: RELEASE, CARPAL TUNNEL;  Surgeon: Jason Pelayo III, MD;  Location: [facility];  Service: Orthopedics;  Laterality: Left;    CYST REMOVAL Right     shoulder    HERNIA REPAIR      LUMBAR DISCECTOMY  02/2018    L 4 L5    TONSILLECTOMY       Family History   Problem Relation Age of Onset    Pacemaker/defibrilator Mother     Lung cancer Father     Cancer Maternal Grandfather     Crohn's disease Maternal Cousin      Social History     Tobacco Use    Smoking status: Former     Passive exposure: Past    Smokeless tobacco: Never    Tobacco comments:     quit smoking at age 26, started at 12   Substance Use Topics    Alcohol use: Not Currently    Drug use: Not Currently         OBJECTIVE:     Vital Signs (Most Recent)  Pulse: 72 (01/31/24 0738)  Resp: 13 (01/31/24 0738)  BP: 123/81 (01/31/24 0738)  SpO2: 98 % (01/31/24  0738)    Physical Exam:                                                       GENERAL:  Comfortable, in no acute distress.                                 HEENT EXAM:  Nonicteric.  No adenopathy.  Oropharynx is clear.               NECK:  Supple.                                                               LUNGS:  Clear.                                                               CARDIAC:  Regular rate and rhythm.  S1, S2.  No murmur.                      ABDOMEN:  Soft, positive bowel sounds, nontender.  No hepatosplenomegaly or masses.  No rebound or guarding.                                             EXTREMITIES:  No edema.     MENTAL STATUS:  Normal, alert and oriented.      ASSESSMENT/PLAN:     Assessment: Dysphagia    Plan: EGD

## 2024-01-31 NOTE — DISCHARGE INSTRUCTIONS
Procedure Date  1/31/24     Impression  Overall Impression:   The esophagus appeared normal.  Performed forceps biopsies in the middle third of the esophagus to rule out eosinophilic esophagitis  Dilated in the esophagus with Savary-Krystle dilator  Mild erythematous mucosa in the antrum; performed cold forceps biopsy  The duodenal bulb and 2nd part of the duodenum appeared normal.     Recommendation  - Discharge patient to home  - Advance diet as tolerated  - Continue present medications  - If symptoms fail to improve with empiric dilation will consider barium esophagram as next step in evaluation of dysphagia  - Await pathology results  - Patient has a contact number available for emergencies. The signs and symptoms of potential delayed complications were discussed with the patient. Return to normal activities tomorrow. Written discharge instructions were provided to the patient.    Continue home medications as instructed. No driving for 24 hrs. No drinking alcohol. No signing legal documents for 24 hrs. Drink plenty of fluids

## 2024-01-31 NOTE — ANESTHESIA POSTPROCEDURE EVALUATION
Anesthesia Post Evaluation    Patient: Alda Gamboa    Procedure(s) Performed: * No procedures listed *    Final Anesthesia Type: general      Patient location during evaluation: PACU  Patient participation: Yes- Able to Participate  Level of consciousness: responds to stimulation  Post-procedure vital signs: reviewed and stable  Pain management: adequate  Airway patency: patent  ROSA mitigation strategies: Multimodal analgesia  PONV status at discharge: No PONV  Anesthetic complications: no      Cardiovascular status: hemodynamically stable  Respiratory status: unassisted, spontaneous ventilation and room air  Hydration status: euvolemic  Follow-up not needed.  Comments: Refer to nursing note for pain/sam score upon discharge from recovery.               Vitals Value Taken Time   /75 01/31/24 0834   Temp 36.1 °C (97 °F) 01/31/24 0834   Pulse 76 01/31/24 0834   Resp 15 01/31/24 0834   SpO2 100 % 01/31/24 0834   Vitals shown include unvalidated device data.      No case tracking events are documented in the log.      Pain/Sam Score: No data recorded

## 2024-01-31 NOTE — TRANSFER OF CARE
Anesthesia Transfer of Care Note    Patient: Alda Gamboa    Procedure(s) Performed: * No procedures listed *    Patient location: GI    Anesthesia Type: general    Transport from OR: Transported from OR on room air with adequate spontaneous ventilation    Post pain: adequate analgesia    Post assessment: no apparent anesthetic complications    Post vital signs: stable    Level of consciousness: responds to stimulation    Nausea/Vomiting: no nausea/vomiting    Complications: none    Transfer of care protocol was followed      Last vitals: Visit Vitals  /75 (BP Location: Left arm, Patient Position: Lying)   Pulse 68   Temp 36.1 °C (97 °F) (Oral)   Resp 11   SpO2 99%

## 2024-02-01 LAB
DHEA SERPL-MCNC: NORMAL
ESTROGEN SERPL-MCNC: NORMAL PG/ML
INSULIN SERPL-ACNC: NORMAL U[IU]/ML
LAB AP GROSS DESCRIPTION: NORMAL
LAB AP LABORATORY NOTES: NORMAL
T3RU NFR SERPL: NORMAL %

## 2024-02-08 ENCOUNTER — OFFICE VISIT (OUTPATIENT)
Dept: ORTHOPEDICS | Facility: CLINIC | Age: 44
End: 2024-02-08
Payer: COMMERCIAL

## 2024-02-08 DIAGNOSIS — Z53.20 PROCEDURE NOT CARRIED OUT BECAUSE OF PATIENT'S DECISION: Primary | ICD-10-CM

## 2024-02-08 PROCEDURE — 99499 UNLISTED E&M SERVICE: CPT | Mod: S$PBB,,, | Performed by: ORTHOPAEDIC SURGERY

## 2024-02-08 PROCEDURE — 99212 OFFICE O/P EST SF 10 MIN: CPT | Mod: PBBFAC | Performed by: ORTHOPAEDIC SURGERY

## 2024-03-01 ENCOUNTER — OFFICE VISIT (OUTPATIENT)
Dept: DERMATOLOGY | Facility: CLINIC | Age: 44
End: 2024-03-01
Payer: COMMERCIAL

## 2024-03-01 DIAGNOSIS — D48.9 NEOPLASM OF UNCERTAIN BEHAVIOR: Primary | ICD-10-CM

## 2024-03-01 DIAGNOSIS — D22.9 MULTIPLE BENIGN NEVI: ICD-10-CM

## 2024-03-01 DIAGNOSIS — H93.90 EAR LESION: ICD-10-CM

## 2024-03-01 PROCEDURE — 88305 TISSUE EXAM BY PATHOLOGIST: CPT | Mod: TC,SUR | Performed by: STUDENT IN AN ORGANIZED HEALTH CARE EDUCATION/TRAINING PROGRAM

## 2024-03-01 PROCEDURE — 88312 SPECIAL STAINS GROUP 1: CPT | Mod: TC,SUR | Performed by: STUDENT IN AN ORGANIZED HEALTH CARE EDUCATION/TRAINING PROGRAM

## 2024-03-01 PROCEDURE — 88312 SPECIAL STAINS GROUP 1: CPT | Mod: 26,,, | Performed by: PATHOLOGY

## 2024-03-01 PROCEDURE — 1159F MED LIST DOCD IN RCRD: CPT | Mod: ,,, | Performed by: STUDENT IN AN ORGANIZED HEALTH CARE EDUCATION/TRAINING PROGRAM

## 2024-03-01 PROCEDURE — 88305 TISSUE EXAM BY PATHOLOGIST: CPT | Mod: 26,,, | Performed by: PATHOLOGY

## 2024-03-01 PROCEDURE — 99203 OFFICE O/P NEW LOW 30 MIN: CPT | Mod: 25,,, | Performed by: STUDENT IN AN ORGANIZED HEALTH CARE EDUCATION/TRAINING PROGRAM

## 2024-03-01 PROCEDURE — 11102 TANGNTL BX SKIN SINGLE LES: CPT | Mod: ,,, | Performed by: STUDENT IN AN ORGANIZED HEALTH CARE EDUCATION/TRAINING PROGRAM

## 2024-03-01 NOTE — PROGRESS NOTES
[facility]  Cas Choi MD    [address] [place] [address]  [phone]    Fax: [phone]    Patient Name: Alda Gamboa  Medical Record Number: [id number]  PCP: New Osman MD  Age: 44 y.o. : 1980  Contact: [phone] (home)     CC: lesion on scalp  History of Present Illness:     Alda Gamboa is a 44 y.o.  male with no history of skin cancer  who presents to clinic today for lesion on scalp. It has been present 1 year. Symptoms include growth.     The patient has no other concerns today.    Review of Systems:     Unremarkable other than mentioned above.     Physical Exam:     General: Relaxed, oriented, alert    Skin examination of the scalp, face, neck, chest, back, abdomen, upper extremities and lower extremities were normal except for as listed below      Assessment and Plan:     1. Neoplasm of Uncertain Behavior   - crusted papule located on the left parietal scalp  Ddx includes: ingrown hair vs r/o BCC    Shave biopsy      Pre-procedure Diagnosis: as above  Post_procedure Diagnosis: same  Estimated Blood Loss: <1cc    Findings: None  Complications: None  Specimens: to pathology      Written informed consent was obtained after discussing risks including pain, bleeding, infection, recurrence and scarring. The biopsy site was sterilely prepped with alcohol, which was allowed to dry completely, then locally infiltrated with 1% lidocaine with epinephrine, ~3 cc total. A shave biopsy was obtained using a Dermablade/15 and the specimen was sent to dermatopathology. Aluminum chloride was used for hemostasis. Antibiotic ointment and a clean dressing were applied. The patient tolerated the procedure well without complications. Verbal and written wound care instructions were given.    Cas Choi MD       2. Benign appearing melanocytic nevi   - no lesions appear clinically or dermatoscopically atypical enough to warrant biopsy at this time  - The  patient was counseled on the ABCDE's of melanoma and on the importance of performing monthly self skin checks at home in between visits and will call our clinic with changes.  - discussed importance of sunscreen SPF 30 or greater     Return to clinic PRN     AVS printed with patient instructions     Cas Choi MD   Mohs Surgery/Dermatologic Oncology  Dermatology

## 2024-03-05 LAB
ESTROGEN SERPL-MCNC: NORMAL PG/ML
INSULIN SERPL-ACNC: NORMAL U[IU]/ML
LAB AP GROSS DESCRIPTION: NORMAL
LAB AP LABORATORY NOTES: NORMAL
LAB AP SPEC A DDX: NORMAL
LAB AP SPEC A MORPHOLOGY: NORMAL
LAB AP SPEC A PROCEDURE: NORMAL
T3RU NFR SERPL: NORMAL %

## 2024-03-27 DIAGNOSIS — Z13.220 SCREENING FOR LIPOID DISORDERS: ICD-10-CM

## 2024-03-27 DIAGNOSIS — Z13.1 SCREENING FOR DIABETES MELLITUS: Primary | ICD-10-CM

## 2024-04-04 ENCOUNTER — PATIENT OUTREACH (OUTPATIENT)
Dept: ADMINISTRATIVE | Facility: HOSPITAL | Age: 44
End: 2024-04-04

## 2024-04-04 NOTE — PROGRESS NOTES
Population Health Chart Review & Patient Outreach Details  Per [facility] website, insurance is active and patient is enrolled in [facility]:    CM! Provider CM! Benefit Start Date CMH! Benefit End Date Baseline Risk Track(s) Baseline Risk Level Current Risk Tracks(s) Current Risk Level   PAULINA SIDDIQUI MD 2023 Hypertension, Cholesterol Low Hypertension, Cholesterol Low       Sent message to Urmila to change next appt to [facility]    Further Action Needed If Patient Returns Outreach:            Updates Requested / Reviewed:     []  Care Everywhere    []     []  External Sources ([facility], [facility], DIS, etc.)    [] [facility]   [] [facility]   [] Other:    []  Care Team Updated   []  Removed  or Duplicate Orders   []  Immunization Reconciliation Completed / Queried    [] [place]   [] [place]   [] [place]   [] [place]      Health Maintenance Topics Addressed and Outreach Outcomes / Actions Taken:             Breast Cancer Screening []  Mammogram Order Placed    []  Mammogram Screening Scheduled    []  External Records Requested & Care Team Updated if Applicable    []  External Records Uploaded & Care Team Updated if Applicable    []  Pt Declined Scheduling Mammogram    []  Pt Will Schedule with External Provider / Order Routed & Care Team Updated if Applicable              Cervical Cancer Screening []  Pap Smear Scheduled in Primary Care or OBGYN    []  External Records Requested & Care Team Updated if Applicable       []  External Records Uploaded, Care Team Updated, & History Updated if Applicable    []  Patient Declined Scheduling Pap Smear    []  Patient Will Schedule with External Provider & Care Team Updated if Applicable                  Colorectal Cancer Screening []  Colonoscopy Case Request / Referral / Home Test Order Placed    []  External Records Requested & Care Team Updated if Applicable    []  External Records Uploaded, Care Team Updated, & History Updated if Applicable    []  Patient  Declined Completing Colon Cancer Screening    []  Patient Will Schedule with External Provider & Care Team Updated if Applicable    []  Fit Kit Mailed (add the SmartPhrase under additional notes)    []  Reminded Patient to Complete Home Test                Diabetic Eye Exam []  Eye Exam Screening Order Placed    []  Eye Camera Scheduled or Optometry/Ophthalmology Referral Placed    []  External Records Requested & Care Team Updated if Applicable    []  External Records Uploaded, Care Team Updated, & History Updated if Applicable    []  Patient Declined Scheduling Eye Exam    []  Patient Will Schedule with External Provider & Care Team Updated if Applicable             Blood Pressure Control []  Primary Care Follow Up Visit Scheduled     []  Remote Blood Pressure Reading Captured    []  Patient Declined Remote Reading or Scheduling Appt - Escalated to PCP    []  Patient Will Call Back or Send Portal Message with Reading                 HbA1c & Other Labs []  Overdue Lab(s) Ordered    []  Overdue Lab(s) Scheduled    []  External Records Uploaded & Care Team Updated if Applicable    []  Primary Care Follow Up Visit Scheduled     []  Reminded Patient to Complete A1c Home Test    []  Patient Declined Scheduling Labs or Will Call Back to Schedule    []  Patient Will Schedule with External Provider / Order Routed, & Care Team Updated if Applicable           Primary Care Appointment []  Primary Care Appt Scheduled    []  Patient Declined Scheduling or Will Call Back to Schedule    []  Pt Established with External Provider, Updated Care Team, & Informed Pt to Notify Payor if Applicable           Medication Adherence /    Statin Use []  Primary Care Appointment Scheduled    []  Patient Reminded to  Prescription    []  Patient Declined, Provider Notified if Needed    []  Sent Provider Message to Review to Evaluate Pt for Statin, Add Exclusion Dx Codes, Document   Exclusion in Problem List, Change Statin Intensity Level  to Moderate or High Intensity if Applicable                Osteoporosis Screening []  Dexa Order Placed    []  Dexa Appointment Scheduled    []  External Records Requested & Care Team Updated    []  External Records Uploaded, Care Team Updated, & History Updated if Applicable    []  Patient Declined Scheduling Dexa or Will Call Back to Schedule    []  Patient Will Schedule with External Provider / Order Routed & Care Team Updated if Applicable       Additional Notes:

## 2024-04-09 RX ORDER — OFLOXACIN 3 MG/ML
1 SOLUTION/ DROPS OPHTHALMIC 4 TIMES DAILY
COMMUNITY
Start: 2024-03-04

## 2024-04-09 RX ORDER — POLYETHYLENE GLYCOL-3350 AND ELECTROLYTES 236; 6.74; 5.86; 2.97; 22.74 G/274.31G; G/274.31G; G/274.31G; G/274.31G; G/274.31G
POWDER, FOR SOLUTION ORAL
COMMUNITY
Start: 2024-01-09

## 2024-04-15 ENCOUNTER — OFFICE VISIT (OUTPATIENT)
Dept: FAMILY MEDICINE | Facility: CLINIC | Age: 44
End: 2024-04-15
Payer: COMMERCIAL

## 2024-04-15 VITALS
HEART RATE: 84 BPM | HEIGHT: 72 IN | BODY MASS INDEX: 23.3 KG/M2 | OXYGEN SATURATION: 97 % | TEMPERATURE: 98 F | RESPIRATION RATE: 16 BRPM | DIASTOLIC BLOOD PRESSURE: 96 MMHG | WEIGHT: 172 LBS | SYSTOLIC BLOOD PRESSURE: 142 MMHG

## 2024-04-15 DIAGNOSIS — J32.9 SINUSITIS, UNSPECIFIED CHRONICITY, UNSPECIFIED LOCATION: ICD-10-CM

## 2024-04-15 DIAGNOSIS — M45.9 ANKYLOSING SPONDYLITIS, UNSPECIFIED SITE OF SPINE: Primary | ICD-10-CM

## 2024-04-15 DIAGNOSIS — M25.541 ARTHRALGIA OF BOTH HANDS: ICD-10-CM

## 2024-04-15 DIAGNOSIS — M25.542 ARTHRALGIA OF BOTH HANDS: ICD-10-CM

## 2024-04-15 PROCEDURE — 99213 OFFICE O/P EST LOW 20 MIN: CPT | Mod: 25,,, | Performed by: FAMILY MEDICINE

## 2024-04-15 PROCEDURE — 3077F SYST BP >= 140 MM HG: CPT | Mod: ,,, | Performed by: FAMILY MEDICINE

## 2024-04-15 PROCEDURE — 3008F BODY MASS INDEX DOCD: CPT | Mod: ,,, | Performed by: FAMILY MEDICINE

## 2024-04-15 PROCEDURE — 3080F DIAST BP >= 90 MM HG: CPT | Mod: ,,, | Performed by: FAMILY MEDICINE

## 2024-04-15 PROCEDURE — 1159F MED LIST DOCD IN RCRD: CPT | Mod: ,,, | Performed by: FAMILY MEDICINE

## 2024-04-15 PROCEDURE — 96372 THER/PROPH/DIAG INJ SC/IM: CPT | Mod: ,,, | Performed by: FAMILY MEDICINE

## 2024-04-15 RX ORDER — AZITHROMYCIN 250 MG/1
TABLET, FILM COATED ORAL
Qty: 6 TABLET | Refills: 0 | Status: SHIPPED | OUTPATIENT
Start: 2024-04-15

## 2024-04-15 RX ORDER — DEXAMETHASONE SODIUM PHOSPHATE 4 MG/ML
8 INJECTION, SOLUTION INTRA-ARTICULAR; INTRALESIONAL; INTRAMUSCULAR; INTRAVENOUS; SOFT TISSUE
Status: COMPLETED | OUTPATIENT
Start: 2024-04-15 | End: 2024-04-15

## 2024-04-15 RX ADMIN — DEXAMETHASONE SODIUM PHOSPHATE 8 MG: 4 INJECTION, SOLUTION INTRA-ARTICULAR; INTRALESIONAL; INTRAMUSCULAR; INTRAVENOUS; SOFT TISSUE at 02:04

## 2024-04-15 NOTE — PROGRESS NOTES
New Osman MD        PATIENT NAME: Alda Gamboa  : 1980  DATE: 4/15/24  MRN: [id number]      Billing Provider: New Osman MD  Level of Service: [place] OFFICE/OUTPT VISIT, EST, LEVL III, 20-29 MIN  Patient PCP Information       Provider PCP Type    New Osman MD General            Reason for Visit / Chief Complaint: Sinusitis and Wrist Injury       Update PCP  Update Chief Complaint         History of Present Illness / Problem Focused Workflow     Alda Gamboa presents to the clinic with Sinusitis and Wrist Injury     Worsening pain at base of thumbs.  Being treated for ankylosing spondylitis.      Sinusitis  Pertinent negatives include no congestion, coughing, headaches, neck pain, sinus pressure or sore throat.   Wrist Injury   Pertinent negatives include no chest pain.       Review of Systems     Review of Systems   Constitutional:  Negative for activity change, appetite change, fever and unexpected weight change.   HENT:  Negative for congestion, rhinorrhea, sinus pressure, sinus pain, sore throat and trouble swallowing.    Eyes:  Negative for photophobia, pain, discharge and visual disturbance.   Respiratory:  Negative for cough, chest tightness, wheezing and stridor.    Cardiovascular:  Negative for chest pain, palpitations and leg swelling.   Gastrointestinal:  Negative for abdominal pain, blood in stool, constipation, diarrhea and nausea.   Endocrine: Negative for polydipsia, polyphagia and polyuria.   Genitourinary:  Negative for difficulty urinating, flank pain and hematuria.   Musculoskeletal:  Positive for arthralgias. Negative for neck pain.   Skin:  Negative for rash.   Allergic/Immunologic: Negative for food allergies.   Neurological:  Negative for dizziness, tremors, seizures, syncope, weakness (global weakness) and headaches.   Psychiatric/Behavioral:  Negative for behavioral problems, confusion, decreased concentration, dysphoric mood and hallucinations. The patient is  not nervous/anxious.         Medical / Social / Family History     Past Medical History:   Diagnosis Date    Acute bacterial prostatitis 5/31/2022    Positive Cx for UTI with scant hematuria Treated with 10 day course of Bactrim DS, completed yesterday    Ankylosing spondylitis 5/31/2022    Patient takes immunosuppression - recently changed to Cimzia, Taltz Followed by Dr. Medel for this    Back pain     Elevated prostate specific antigen (PSA) 8/12/2022    Lab Results Component Value Date  PSA 7.600 (H) 08/10/2022  PSA 8.580 (H) 07/15/2022 s/p month of antx for tx of acute exacerbation of chronic prostatitis    Neck pain     Spondylosis        Past Surgical History:   Procedure Laterality Date    CARPAL TUNNEL RELEASE Left 12/13/2023    Procedure: RELEASE, CARPAL TUNNEL;  Surgeon: Jason Pelayo III, MD;  Location: [facility];  Service: Orthopedics;  Laterality: Left;    CYST REMOVAL Right     shoulder    HERNIA REPAIR      LUMBAR DISCECTOMY  02/2018    L 4 L5    TONSILLECTOMY         Social History    reports that he has quit smoking. He has been exposed to tobacco smoke. He has never used smokeless tobacco. He reports that he does not currently use alcohol. He reports that he does not currently use drugs.    Family History  's family history includes Cancer in his maternal grandfather; Crohn's disease in his maternal cousin; Lung cancer in his father; Pacemaker/defibrilator in his mother.    Medications and Allergies     Medications  Current Outpatient Medications   Medication Sig Dispense Refill    azithromycin (Z-ADRIÁN) 250 MG tablet Take two tablets now then 1 tab daily x 4 days 6 tablet 0    clonazePAM (KLONOPIN) 1 MG tablet Take 1 tablet (1 mg total) by mouth 2 (two) times daily as needed for Anxiety. (Patient not taking: Reported on 12/20/2023) 30 tablet 2    cyanocobalamin 1,000 mcg/mL injection Inject 1 mL (1,000 mcg total) into the muscle every 30 days. 1 mL 11    cyclobenzaprine (FLEXERIL)  10 MG tablet Take 1 tablet (10 mg total) by mouth every evening. 30 tablet 11    diclofenac (VOLTAREN) 75 MG EC tablet Take 1 tablet (75 mg total) by mouth 2 (two) times daily as needed (as needed). 90 tablet 3    ergocalciferol (ERGOCALCIFEROL) 50,000 unit Cap Take 1 capsule (50,000 Units total) by mouth every 7 days. (Patient not taking: Reported on 12/20/2023) 4 capsule 2    GAVILYTE-G 236-22.74-6.74 -5.86 gram suspension TAKE 4,000 MLS (4 L TOTAL) BY MOUTH ONCE. FOR ONE (1) DOSE      HYDROcodone-acetaminophen (NORCO) 5-325 mg per tablet Take 1 tablet by mouth every 6 (six) hours as needed for Pain. (Patient not taking: Reported on 12/20/2023) 20 tablet 0    ixekizumab (TALTZ AUTOINJECTOR) 80 mg/mL AtIn Inject 80 mg into the skin every 28 days. 1 mL 6    ofloxacin (OCUFLOX) 0.3 % ophthalmic solution Place 1 drop into the left eye 4 (four) times daily.      pantoprazole (PROTONIX) 40 MG tablet Take 1 tablet (40 mg total) by mouth once daily. 90 tablet 3    pregabalin (LYRICA) 25 MG capsule Take 1 capsule (25 mg total) by mouth 3 (three) times daily. (Patient not taking: Reported on 12/20/2023) 90 capsule 6    sertraline (ZOLOFT) 100 MG tablet 1/2 tab po q AM x 7 days, the 1 tab po daily 90 tablet 3    tadalafiL (CIALIS) 20 MG Tab Take 1 tablet (20 mg total) by mouth once daily. 10 tablet 11    testosterone (TESTIM) 50 mg/5 gram (1 %) Gel Apply 2-3 drops topically once daily. 2 each 2     No current facility-administered medications for this visit.       Allergies  Review of patient's allergies indicates:   Allergen Reactions    Pcn [penicillins] Hives and Swelling     Throat swelling    Sulfasalazine      Other reaction(s): Unknown    Gabapentin Nausea Only and Rash     Other reaction(s): Unknown       Physical Examination     Vitals:    04/15/24 1417   BP: (!) 142/96   Pulse: 84   Resp: 16   Temp: 97.7 °F (36.5 °C)     Physical Exam  Constitutional:       General: He is not in acute distress.     Appearance:  Normal appearance.   HENT:      Head: Normocephalic.      Right Ear: Tympanic membrane and ear canal normal.      Left Ear: Tympanic membrane and ear canal normal.      Nose: Nose normal.      Mouth/Throat:      Mouth: Mucous membranes are moist.      Pharynx: No oropharyngeal exudate.   Eyes:      Extraocular Movements: Extraocular movements intact.      Pupils: Pupils are equal, round, and reactive to light.   Cardiovascular:      Rate and Rhythm: Normal rate and regular rhythm.      Heart sounds: No murmur heard.  Pulmonary:      Effort: Pulmonary effort is normal.      Breath sounds: Normal breath sounds. No wheezing.   Abdominal:      General: Abdomen is flat. Bowel sounds are normal.      Palpations: Abdomen is soft.      Hernia: No hernia is present.   Musculoskeletal:         General: Swelling and tenderness present. Normal range of motion.      Cervical back: Normal range of motion and neck supple.      Right lower leg: No edema.      Left lower leg: No edema.      Comments: Metacarpal/scahpoid joints bilat.     Lymphadenopathy:      Cervical: No cervical adenopathy.   Skin:     General: Skin is warm and dry.      Coloration: Skin is not jaundiced.      Findings: No lesion.   Neurological:      General: No focal deficit present.      Mental Status: He is alert and oriented to person, place, and time.      Cranial Nerves: No cranial nerve deficit.      Gait: Gait normal.   Psychiatric:         Mood and Affect: Mood normal.         Behavior: Behavior normal.         Judgment: Judgment normal.          Assessment and Plan (including Health Maintenance)      Problem List  Smart Sets  Document Outside HM   :    Plan:     1. Ankylosing spondylitis, unspecified site of spine    Overview:  Patient takes immunosuppression - recently changed to Cimzia, Taltz  Followed by Dr. Medel for this            Health Maintenance Due   Topic Date Due    HIV Screening  Never done       1. Ankylosing spondylitis, unspecified site  of spine  Overview:  Patient takes immunosuppression - recently changed to Cimzia, Taltz  Followed by Dr. Medel for this    Orders:  -     Ambulatory referral/consult to Rheumatology; Future; Expected date: 04/22/2024    2. Arthralgia of both hands    3. Sinusitis, unspecified chronicity, unspecified location  -     dexAMETHasone injection 8 mg  -     azithromycin (Z-ADRIÁN) 250 MG tablet; Take two tablets now then 1 tab daily x 4 days  Dispense: 6 tablet; Refill: 0         Health Maintenance Topics with due status: Not Due       Topic Last Completion Date    TETANUS VACCINE 08/04/2020    Lipid Panel 12/18/2023    Colorectal Cancer Screening 01/10/2024       Future Appointments   Date Time Provider Department Center   4/17/2024  1:00 PM Claire Levin FNP [facility]   7/5/2024  9:40 AM New Osman MD [facility] Primary        There are no Patient Instructions on file for this visit.  Follow up in about 3 months (around 7/15/2024) for routine followup.     Signature:  New Osman MD      Date of encounter: 4/15/24

## 2024-04-17 ENCOUNTER — OFFICE VISIT (OUTPATIENT)
Dept: RHEUMATOLOGY | Facility: CLINIC | Age: 44
End: 2024-04-17
Payer: COMMERCIAL

## 2024-04-17 VITALS
BODY MASS INDEX: 23.29 KG/M2 | SYSTOLIC BLOOD PRESSURE: 132 MMHG | HEART RATE: 73 BPM | OXYGEN SATURATION: 97 % | HEIGHT: 72 IN | DIASTOLIC BLOOD PRESSURE: 98 MMHG | WEIGHT: 171.94 LBS | RESPIRATION RATE: 16 BRPM

## 2024-04-17 DIAGNOSIS — M06.4 INFLAMMATORY POLYARTHRITIS: Primary | ICD-10-CM

## 2024-04-17 DIAGNOSIS — M45.0 ANKYLOSING SPONDYLITIS OF MULTIPLE SITES IN SPINE: ICD-10-CM

## 2024-04-17 PROCEDURE — 99215 OFFICE O/P EST HI 40 MIN: CPT | Mod: S$PBB,,, | Performed by: NURSE PRACTITIONER

## 2024-04-17 PROCEDURE — 3075F SYST BP GE 130 - 139MM HG: CPT | Mod: ,,, | Performed by: NURSE PRACTITIONER

## 2024-04-17 PROCEDURE — 99215 OFFICE O/P EST HI 40 MIN: CPT | Mod: PBBFAC | Performed by: NURSE PRACTITIONER

## 2024-04-17 PROCEDURE — 3008F BODY MASS INDEX DOCD: CPT | Mod: ,,, | Performed by: NURSE PRACTITIONER

## 2024-04-17 PROCEDURE — 1159F MED LIST DOCD IN RCRD: CPT | Mod: ,,, | Performed by: NURSE PRACTITIONER

## 2024-04-17 PROCEDURE — 3080F DIAST BP >= 90 MM HG: CPT | Mod: ,,, | Performed by: NURSE PRACTITIONER

## 2024-04-17 NOTE — PROGRESS NOTES
RHEUMATOLOGY OUTPATIENT CLINIC NOTE    Subjective:       Patient ID: Alda Gamboa is a 44 y.o. male.    Chief Complaint: Ankylosing Spondylitis (3mo follow up on Taltz. C/o wrist pain still. Thinking of possible misdiagnosis? States that since childhood, he has had multiple joints that have come out of socket without explanation (most recent one in January with R wrist). )       History of Present Illness: 45 y/o male presents to the clinic today with past Rheumatologic history of Ankylosing spondylitis. Previously followed by three other Rheumatologists. Diagnosed with Ankylosing spondylitis and started on MTX initially but was unable to tolerate r/t GI upset and Plaquenil caused petechiae. Has tried Humira and Cimzia in the past (wife present at side states that the biologics only gave relief for about 1 year) is now currently on Taltz and has been taking for about 2 years. Endorses photosensitivity, history of Uveitis, MCP joint pain bilaterally associated with AM stiffness. Is being treated by Ortho for carpal tunnel (has had surgery on left wrist and was offered right wrist as well but has not had that surgery yet). Complains of joint laxity, states that since he was a young child that he has had multiple joint that come out of socket without explanation (wrist, ankle, knee, shoulder). Also, had case of prostatitis. States that he has moderate to severe debilitating fatigue that waxes and wanes. Sometimes can only work for 4 hours and fatigue sets in so severely he has to take a nap. This is affecting his ability to work.     States that red meats trigger joint pains. Discussed inflammatory foods and ones to avoid. Print out of foods also provided to patient.     Previously tried: MTX (GI upset), Plaquenil (petechiae), Humira, Cimzia, and Taltz.    Past Medical History:   Diagnosis Date    Acute bacterial prostatitis 5/31/2022    Positive Cx for UTI with scant hematuria Treated with 10 day course of  Bactrim DS, completed yesterday    Ankylosing spondylitis 5/31/2022    Patient takes immunosuppression - recently changed to Cimzia, Taltz Followed by Dr. Medel for this    Back pain     Elevated prostate specific antigen (PSA) 8/12/2022    Lab Results Component Value Date  PSA 7.600 (H) 08/10/2022  PSA 8.580 (H) 07/15/2022 s/p month of antx for tx of acute exacerbation of chronic prostatitis    Neck pain     Spondylosis      Social History     Tobacco Use    Smoking status: Former     Passive exposure: Past    Smokeless tobacco: Never    Tobacco comments:     quit smoking at age 26, started at 12   Substance Use Topics    Alcohol use: Not Currently     Review of patient's allergies indicates:   Allergen Reactions    Pcn [penicillins] Hives and Swelling     Throat swelling    Sulfasalazine      Other reaction(s): Unknown    Gabapentin Nausea Only and Rash     Other reaction(s): Unknown       Objective:   BP (!) 132/98 (BP Location: Left arm, Patient Position: Sitting, BP Method: Large (Manual))   Pulse 73   Resp 16   Ht 6' (1.829 m)   Wt 78 kg (171 lb 15.3 oz)   SpO2 97%   BMI 23.32 kg/m²     Immunization History   Administered Date(s) Administered    COVID-19, MRNA, LN-S, PF (MODERNA FULL 0.5 ML DOSE) 03/23/2021, 04/21/2021, 10/12/2021    Influenza - Quadrivalent - PF *Preferred* (6 months and older) 11/18/2022, 10/23/2023    Tdap 08/04/2020       Current Outpatient Medications   Medication Instructions    azithromycin (Z-ADRIÁN) 250 MG tablet Take two tablets now then 1 tab daily x 4 days    clonazePAM (KLONOPIN) 1 mg, Oral, 2 times daily PRN    cyanocobalamin 1,000 mcg, Intramuscular, Every 30 days    cyclobenzaprine (FLEXERIL) 10 mg, Oral, Nightly    diclofenac (VOLTAREN) 75 mg, Oral, 2 times daily PRN    ergocalciferol (ERGOCALCIFEROL) 50,000 Units, Oral, Every 7 days    GAVILYTE-G 236-22.74-6.74 -5.86 gram suspension TAKE 4,000 MLS (4 L TOTAL) BY MOUTH ONCE. FOR ONE (1) DOSE    HYDROcodone-acetaminophen  (NORCO) 5-325 mg per tablet 1 tablet, Oral, Every 6 hours PRN    ofloxacin (OCUFLOX) 0.3 % ophthalmic solution 1 drop, 4 times daily    pantoprazole (PROTONIX) 40 mg, Oral, Daily    pregabalin (LYRICA) 25 mg, Oral, 3 times daily    sertraline (ZOLOFT) 100 MG tablet 1/2 tab po q AM x 7 days, the 1 tab po daily    tadalafiL (CIALIS) 20 mg, Oral, Daily    TALTZ AUTOINJECTOR 80 mg, Subcutaneous, Every 28 days    testosterone (TESTIM) 50 mg/5 gram (1 %) Gel 2-3 drops, Topical (Top), Daily        BIOLOGIC LABS  Lab Results   Component Value Date    HEPCAB Non-Reactive 08/10/2022      RHEUM LABS  Lab Results   Component Value Date    CRP <0.29 12/07/2023     HLA B27- history of positive  Assessment:     Review of Systems   Constitutional:  Positive for malaise/fatigue. Negative for fever.   Respiratory:  Negative for cough and shortness of breath.    Cardiovascular:  Negative for chest pain.   Gastrointestinal:  Negative for nausea and vomiting.   Musculoskeletal:  Positive for joint pain. Negative for falls.   Skin:  Negative for rash.        Physical Exam   Constitutional: No distress.   HENT:   Head: Normocephalic.   Right Ear: External ear normal.   Left Ear: External ear normal.   Nose: Nose normal.   Mouth/Throat: Mucous membranes are moist.   Pulmonary/Chest: Effort normal.   Musculoskeletal:         General: Normal range of motion.      Cervical back: Normal range of motion. No rigidity.   Neurological: He is alert.   Skin: Skin is warm. No rash noted.   Psychiatric: Mood normal.   Vitals reviewed.          I have reviewed the following:     Details / Date    []   Labs  No labs drawn prior to visit.     []   ACR Score     []   RAPID 3 or CDAI score     []   Imaging     []   Cardiology Procedures     [x]   Other  Chart Review     Plan:          1. Inflammatory polyarthritis    2. Ankylosing spondylitis of multiple sites in spine      Continue Taltz  COURTNEY, CRP, ESR, RF, CCP today   External referral to Rheumatology  for continuation of care.    45 minutes of total time spent on the encounter, which includes face to face time and non-face to face time preparing to see the patient (eg, review of tests), Obtaining and/or reviewing separately obtained history, Documenting clinical information in the electronic or other health record, Independently interpreting results (not separately reported) and communicating results to the patient/family/caregiver, or Care coordination (not separately reported).            BURTON Young  [facility] RHE[facility]ATOLOG[facility]  [address] [address]  [phone]

## 2024-04-17 NOTE — PATIENT INSTRUCTIONS
Include these anti-Inflammatory foods:  ? Fresh vegetables (all kinds): Aim for a variety of types and colours (a rainbow  of veggies provides phytonutrients) - with a minimum of four to five servings  per day - especially dark, leafy greens (spinach, kale, Asian greens) broccoli  and cauliflower, Brussel sprouts, beetroot  ? Whole pieces of fruit (not juice that strips them of fibre): Three to four  servings per day is a good amount for most people, especially berries and  cherries  ? Fatty fish - such as salmon, trout, sardines, mackerel & tuna  ? Herbs & spices: turmeric, ashleigh, basil, oregano, thyme, cinnamon etc.,  ? Healthy fats: extra virgin olive oil is the best option, avocado oil and the fats  found in seeds, nuts, fish  ? Nuts/seeds - such as walnuts, cashews, almonds, pistachios, pine nuts, socorro,  hemp  ? Whole grains - brown rice, amaranth, buckwheat, and quinoa  ? Legumes/beans: especially black beans, black-eyed peas, chickpeas, lentils,  red kidney  ? Drinks: water, green tea and organic coffee in moderation  Avoid these inflammatory / processed foods  ? Processed meats - sausages and cold cut meats - ham, salami etc.  ? Refined sugars - found in soft drinks, cookies, cake, lollies, ice cream, some  breakfast cereals  ? Trans fats - found in deep fried foods, fast foods, commercially baked goods  ? Processed snack foods - such as chips and crackers  ? Gluten, white bread & pasta & too many carbohydrates  ? Soybean oil and vegetable oil  ? Alcohol in excessive quantities  www.arthritisnsw.org.au  Easy Food Swaps & Meal Ideas  Breakfast  Instead of white bread (crumpets and English muffins), butter, jam, Nutella, processed  breakfast cereals, pancakes and meza, try:  ? Avocado, nut spread (not peanut), banana/cinnamon on rye bread  ? Have porridge (whole oats) & berries- bernarda with a small amount of maple  syrup or honey  ? Organic scrambled / boiled eggs with herbs or turmeric  ? Avocado &  Stephanie    CC:  Jenny Nuñez [phone].(pt Sees Dr. Vanegas in [place] smoked salmon on whole grain bread  ? Granola with Greek yoghurt and berries  Lunch  Instead of hot chips, frozen meals, white bread sandwiches (cold cuts),  pasta/potato/Caesar salads try:  ? Vegetable frittata with salad  ? Salads with a variety of fruit and vegetables - also add brown rice, quinoa and  nuts to be more filling - dress with oil and vinegar and spices (not store-bought  dressings); replace lettuce with spinach  ? Sawyer cakes & salad  ? Lettuce wraps with salmon or trout, herbs and carrot/cucumber  Dinner  Instead of pizza, pasta, hamburgers, red meat roasts try:  ? Try one-tray bakes with fish, lean chicken and vegies (less washing up!)  ? Have minestrone soup instead of pumpkin soup - use a variety of vegies & herbs  - count how many colours and types you can get in one soup  ? Replace side serves of pasta, white rice and potatoes with more veggies, salad  or whole grains  ? [place] fish and vegies on the BBQ and service with a salad  ? Asian soups with salmon and greens  Snacks  Instead of chips, biscuits, cakes, chocolate, try:  ? A handful of nuts - roast them or buy roasted for some extra crunch and flavour  ? Kale chips - kale, olive oil and a small sprinkle of salt & roast in the oven  ? Toasted elia spread, carrots, cucumber with hummus or avocado dip  ? Mixed berries and Greek yoghurt  ? Grab a piece of fruit  Drinks  Instead of soft drinks and excessive alcohol, try:  ? Water with a squeeze of citrus or flavoured with cut up fruit  ? Mineral water  ? Green tea and organic coffee in moderation

## 2024-04-27 NOTE — TELEPHONE ENCOUNTER
Specialty Pharmacy - Refill Coordination    Specialty Medication Orders Linked to Encounter      Flowsheet Row Most Recent Value   Medication #1 ixekizumab (TALTZ AUTOINJECTOR) 80 mg/mL AtIn (Order#[id number], Rx#[id number])            Refill Questions - Documented Responses      Flowsheet Row Most Recent Value   Patient Availability and HIPAA Verification    Does patient want to proceed with activity? Yes   HIPAA/medical authority confirmed? Yes   Relationship to patient of person spoken to? Self   Refill Screening Questions    Changes to allergies? No   Changes to medications? No   New conditions since last clinic visit? No   Unplanned office visit, urgent care, ED, or hospital admission in the last 4 weeks? No   How does patient/caregiver feel medication is working? Good   Financial problems or insurance changes? No   How many doses of your specialty medications were missed in the last 4 weeks? 0   Would patient like to speak to a pharmacist? No   When does the patient need to receive the medication? 03/10/23   Refill Delivery Questions    How will the patient receive the medication? Mail   When does the patient need to receive the medication? 03/10/23   Shipping Address Home   Address in [facility] confirmed and updated if neccessary? Yes   Expected Copay ($) 5   Is the patient able to afford the medication copay? Yes   Payment Method CC on file   Days supply of Refill 28   Supplies needed? No supplies needed   Refill activity completed? Yes   Refill activity plan Refill scheduled   Shipment/Pickup Date: 03/06/23            Current Outpatient Medications   Medication Sig    clonazePAM (KLONOPIN) 1 MG tablet Take 1 tablet (1 mg total) by mouth 2 (two) times daily as needed for Anxiety.    cyanocobalamin 1,000 mcg/mL injection Inject 1 mL (1,000 mcg total) into the muscle every 30 days.    cyclobenzaprine (FLEXERIL) 10 MG tablet Take 1 tablet (10 mg total) by mouth every evening.    diclofenac (VOLTAREN) 75  MG EC tablet Take 75 mg by mouth 2 (two) times daily as needed.    ergocalciferol (ERGOCALCIFEROL) 50,000 unit Cap Take 1 capsule (50,000 Units total) by mouth every 7 days.    ixekizumab (TALTZ AUTOINJECTOR) 80 mg/mL AtIn Inject 1 mL (80 mg) into the skin every 4 weeks    pantoprazole (PROTONIX) 40 MG tablet Take 1 tablet (40 mg total) by mouth once daily.    pregabalin (LYRICA) 25 MG capsule Take 1 capsule (25 mg total) by mouth 3 (three) times daily.    sertraline (ZOLOFT) 100 MG tablet 1/2 tab po q AM x 7 days, the 1 tab po daily    tadalafiL (CIALIS) 20 MG Tab Take 1 tablet (20 mg total) by mouth once daily.    testosterone (TESTIM) 50 mg/5 gram (1 %) Gel Apply 2-3 drops topically once daily.   Last reviewed on 2/15/2023  2:16 PM by Anuradha Cruz MD    Review of patient's allergies indicates:   Allergen Reactions    Pcn [penicillins] Hives and Swelling     Throat swelling    Sulfasalazine      Other reaction(s): Unknown    Gabapentin Nausea Only and Rash     Other reaction(s): Unknown    Last reviewed on  2/15/2023 2:16 PM by Anuradha Cruz      Tasks added this encounter   3/31/2023 - Refill Call (Auto Added)   Tasks due within next 3 months   No tasks due.     Hannah Reilly [facility]  [address] [place] [id number]  Phone: [phone]  Fax: [phone]         h/o rads/asthma/copd, bronchiectasis, savage,  trelegy => spiriva + symbicort  singulair  ventolin prn

## 2024-04-30 ENCOUNTER — CLINICAL SUPPORT (OUTPATIENT)
Dept: REHABILITATION | Facility: HOSPITAL | Age: 44
End: 2024-04-30
Payer: COMMERCIAL

## 2024-04-30 DIAGNOSIS — G56.03 CARPAL TUNNEL SYNDROME ON BOTH SIDES: ICD-10-CM

## 2024-04-30 PROCEDURE — 97165 OT EVAL LOW COMPLEX 30 MIN: CPT

## 2024-04-30 NOTE — PLAN OF CARE
[facility] Occupational Therapy  Initial Evaluation     Date: 4/30/2024  Name: Alda Gamboa  Clinic Number: [id number]    Therapy Diagnosis: No diagnosis found.  Physician: Erinn Dia DO    Physician Orders: Evaluate and treat.  Medical Diagnosis:  Carpal tunnel syndrome on both sides, Left CMC arthritis  Date of Injury/Onset: 6 years ago  Evaluation Date: 4/30/2024  Insurance Authorization Period Expiration: 1/3/2024 - 1/2/2025   Plan of Care Certification Period: 4/30/2024-6/25/2024      Visit # / Visits authorized: 1 / 1    FOTO: initial eval  Medicare Amount:     Time In:1:22  Time Out: 1:59  Total treatment time: 37minutes      Precautions:  Standard    Subjective     Involved Side: BUE  Dominant Side: Right  Date of Onset: Years ago  History of Current Condition: Pt. Reports he has had pain in bilateral hands for about 6 years. Reports he saw  In December who diagnosed him with carpal tunnel syndrome. Nerve conduction test performed which showed carpal tunnel syndrome. He dad surgery 12/13/2023 which consisted of carpal tunnel release of LUE. He continues to have pain in bilateral hands primarily thumbs. He appears to have some arthritis of bilateral thumbs at cmc joint. He complaints of pain and difficulty holding items due to pain.  Imaging: bone scan films       Past Medical History/Physical Systems Review:   Alda Gamboa  has a past medical history of Acute bacterial prostatitis, Ankylosing spondylitis, Back pain, Elevated prostate specific antigen (PSA), Neck pain, and Spondylosis.    Alda Gamboa  has a past surgical history that includes Hernia repair; Tonsillectomy; Lumbar discectomy (02/2018); Cyst Removal (Right); and Carpal tunnel release (Left, 12/13/2023).    Alda has a current medication list which includes the following prescription(s): azithromycin, clonazepam, cyanocobalamin, cyclobenzaprine, diclofenac, ergocalciferol, gavilyte-g,  hydrocodone-acetaminophen, taltz autoinjector, ofloxacin, pantoprazole, pregabalin, sertraline, tadalafil, and testosterone.    Review of patient's allergies indicates:   Allergen Reactions    Pcn [penicillins] Hives and Swelling     Throat swelling    Sulfasalazine      Other reaction(s): Unknown    Gabapentin Nausea Only and Rash     Other reaction(s): Unknown        Patient's Goals for Therapy: TO be able to use hands without pain.    Pain:  Functional Pain Scale Rating 0-10:   8/10 on average  0/10 at best  10/10 at worst  Location: Bilateral hands  Description: Aching, Dull, Burning, Throbbing, Grabbing, Tight, Tingling, and Numb  Aggravating Factors: Laying, Bending, Touching, Night Time, Morning, Extension, Flexing, Lifting, and Getting out of bed/chair  Easing Factors: nothing    Occupation:  Unempoyed  Working presently: unemployed      Functional Limitations/Social History:    Previous functional status includes: Independent with all ADLs.     Current FunctionalStatus   Home/Living environment : lives with their spouse      Limitation of Functional Status as follows:   ADLs/IADLs:     - Feeding: (I)    - Bathing: (I)    - Dressing/Grooming: (I)    - Driving: (I)             Objective     Observation/Appearance:     Edema. Measured in centimeters.   4/30/2024 4/30/2024    Right Left   2in. Above elbow     2in. Below elbow     Wrist Crease     Distal Palmar Crease     MCPs       Edema. Measured in centimeters.   4/30/2024 4/30/2024    Right Left   Index:       P1      PIP     P2      DIP     P3     Long:       P1      PIP     P2      DIP     P3     Ring:       P1                 PIP                P2                  DIP     P3     Small:        P1                 PIP            P2             DIP     P3     Thumb:     Prox. Phalanx     IP     Distal Phalanx       Wrist ROM. Measured in degrees.   4/30/2024 4/30/2024    Right Left   Wrist Ext/Flex 75/76 71/76     Hand ROM. Measured in degrees.   4/30/2024  4/30/2024    Right Left        Index: MP  94 96              PIP     104 101              DIP 45 67              GHOSH          Long:  MP 91 94               103              DIP 44 76              GHOSH          Ring:   MP 86 87               105              DIP 39 71              GHOSH          Small:  MP 92 92               PIP 96 99               DIP 36 70              GHOSH          Thumb: MP 75 58                IP 69 61      Strength (Dynamometer) and Pinch Strength (Pinch Gauge)  Measured in pounds.   4/30/2024 4/30/2024    Right Left   Rung  121   Key Pinch 21 16   3pt Pinch 16 14   2pt Pinch 13 11       Manual Muscle Test   4/30/2024 4/30/2024    Right Left   Wrist Extension  4/5 4/5   Wrist Flexion 4/5 4/5         Special Tests  Thumb CMC Grind Test    Finkelstein's Test    Phalen's Test    Tinel's Test    Gustavo's Test    Ash-Littler Test    Digital Collateral Stress Test    ORL Test    Froment's Sign    Pinch OK Sign    Ebony's Sign     Egawa Sign     Clamp Sign     SL Ballottement Test    LT Ballottement Test    Scaphoid/WatsonTest    Linscheid's Test    Metacarpal Stress Test    Piano Key Test    ECU Synergy Test    Ulnar Compression Test    TFCC Load Test    Ulnocarpal Stress Test    Midcarpal Shift Test    Pisiform Boost Test    Elbow Flexion Test    Scratch Collapse Test    Tennis Elbow Test    Resisted Middle Finger Extension Test    Mills Test    Chair Test    Biceps Squeeze Test    Biceps Hook Test    Milking Test    Press Up Manuever    PLRI Test    Valgus Stress Test    Varus Stress Test    Spurling Test    Cervical Distraction Test    ULNTT - General    ULNTT - Median Nerve    ULNTT - Radial Nerve    ULNTT - Ulnar Nerve         CMS Impairment/Limitation/Restriction for FOTO  Survey    Therapist reviewed FOTO scores for Alda Gamboa on 4/30/2024.   FOTO documents entered into [employer] - see Media section.    Limitation Score: 46%         Treatment     Treatment Time In:  1:22  Treatment Time Out: 1:59      Alda received the following supervised modalities after being cleared for contradictions for  minutes:   -N/A    Alda received the following manual therapy techniques for  minutes:   -N/A    Alda received therapeutic exercises for  minutes including:  -N/A    Home Exercise Program/Education:  Issued HEP (see patient instructions in EMR) and educated on modality use for pain management . Exercises were reviewed and Alda was able to demonstrate them prior to the end of the session.   Pt received a written copy of exercises to perform at home. Alda demonstrated good  understanding of the education provided.  Pt was advised to perform these exercises free of pain, and to stop performing them if pain occurs.    Patient/Family Education: role of OT, goals for OT, scheduling/cancellations - pt verbalized understanding. Discussed insurance limitations with patient.    Additional Education provided:     Assessment     Alda Gamboa is a 44 y.o. male referred to outpatient occupational therapy and presents with a medical diagnosis of Carpal tunnel syndrome on both sides , resulting in decreased ROM/strength with increased pain and numbness/tingling and demonstrates limitations as described above. Pt. Seems to have some cmc arthritis of Left thumb. Recommend referral to orthopedic surgeon. Following medical record review it is determined that pt will benefit from occupational therapy services in order to maximize pain free and/or functional use of bilateral UE. The following goals were discussed with the patient and patient is in agreement with them as to be addressed in the treatment plan. The patient's rehab potential is Fair.     Anticipated barriers to occupational therapy:   Pt has no cultural, educational or language barriers to learning provided.        Goals:   The following goals were discussed with the patient and patient is in agreement with them as to be addressed in the  treatment plan.   Short term Goals:  1) Initiate HEP  2) Pt will increase AROM of BUE by 5-10 degrees in order to assist with functional full fist by 4 weeks.  3) Pt will reduce edema by .5-1 cm in affected fingers by 4 weeks.  4) Pt will reduce pain to less than 4/10 by 4 weeks.  5) Pt will increase functional  strength by 5# in order to A in opening containers for med management or home management tasks by 4 weeks.   6) Patient will be able to achieve less than or equal to 46% on the FOTO, demonstrating overall improved functional ability with upper extremity. (Self-care category)    Long Term Goals:  Goals to be met by discharge:  1) Independent with HEP  2) Pt will increase BUE GHOSH by 20-30 degrees in order to increase functional fist for grasp with home management or work related tasks by d/c.   3) Pt will decrease edema to trace or none to increase functional ROM by d/c.   4) Pt will decrease pain to trace or none while completing light home management tasks or work related tasks by d/c.   5) Patient will be able to achieve less than or equal to 46% on the FOTO, demonstrating overall improved functional ability with upper extremity.  (Self-care category)        Plan   Certification Period/Plan of care expiration: 4/30/2024 to 6/25/2024.    Outpatient Occupational Therapy 2 times weekly for 8 weeks to include the following interventions: Paraffin, Fluidotherapy, Manual therapy/joint mobilizations, Modalities for pain management, US 3 mhz, Therapeutic exercises/activities., Iontophoresis with 2.0 cc Dexamethasone, Strengthening, Orthotic Fabrication/Fit/Training, Electrical Modalities, Joint Protection, and Energy Conservation.      ROYA FREITAS, OT

## 2024-04-30 NOTE — PROGRESS NOTES
[facility] Occupational Therapy  Initial Evaluation     Date: 4/30/2024  Name: Alda Gamboa  Clinic Number: [id number]    Therapy Diagnosis: No diagnosis found.  Physician: Erinn Dia DO    Physician Orders: Evaluate and treat.  Medical Diagnosis:  Carpal tunnel syndrome on both sides, Left CMC arthritis  Date of Injury/Onset: 6 years ago  Evaluation Date: 4/30/2024  Insurance Authorization Period Expiration: 1/3/2024 - 1/2/2025   Plan of Care Certification Period: 4/30/2024-6/25/2024      Visit # / Visits authorized: 1 / 1    FOTO: initial eval  Medicare Amount:     Time In:1:22  Time Out: 1:59  Total treatment time: 37minutes      Precautions:  Standard    Subjective     Involved Side: BUE  Dominant Side: Right  Date of Onset: Years ago  History of Current Condition: Pt. Reports he has had pain in bilateral hands for about 6 years. Reports he saw  In December who diagnosed him with carpal tunnel syndrome. Nerve conduction test performed which showed carpal tunnel syndrome. He dad surgery 12/13/2023 which consisted of carpal tunnel release of LUE. He continues to have pain in bilateral hands primarily thumbs. He appears to have some arthritis of bilateral thumbs at cmc joint. He complaints of pain and difficulty holding items due to pain.  Imaging: bone scan films       Past Medical History/Physical Systems Review:   Alda Gamboa  has a past medical history of Acute bacterial prostatitis, Ankylosing spondylitis, Back pain, Elevated prostate specific antigen (PSA), Neck pain, and Spondylosis.    Alda Gamboa  has a past surgical history that includes Hernia repair; Tonsillectomy; Lumbar discectomy (02/2018); Cyst Removal (Right); and Carpal tunnel release (Left, 12/13/2023).    Alda has a current medication list which includes the following prescription(s): azithromycin, clonazepam, cyanocobalamin, cyclobenzaprine, diclofenac, ergocalciferol, gavilyte-g,  hydrocodone-acetaminophen, taltz autoinjector, ofloxacin, pantoprazole, pregabalin, sertraline, tadalafil, and testosterone.    Review of patient's allergies indicates:   Allergen Reactions    Pcn [penicillins] Hives and Swelling     Throat swelling    Sulfasalazine      Other reaction(s): Unknown    Gabapentin Nausea Only and Rash     Other reaction(s): Unknown        Patient's Goals for Therapy: TO be able to use hands without pain.    Pain:  Functional Pain Scale Rating 0-10:   8/10 on average  0 /10 at best  10/10 at worst  Location: Bilateral hands  Description: Aching, Dull, Burning, Throbbing, Grabbing, Tight, Tingling, and Numb  Aggravating Factors: Laying, Bending, Touching, Night Time, Morning, Extension, Flexing, Lifting, and Getting out of bed/chair  Easing Factors: nothing    Occupation:  Unempoyed  Working presently: unemployed      Functional Limitations/Social History:    Previous functional status includes: Independent with all ADLs.     Current FunctionalStatus   Home/Living environment : lives with their spouse      Limitation of Functional Status as follows:   ADLs/IADLs:     - Feeding: (I)    - Bathing: (I)    - Dressing/Grooming: (I)    - Driving: (I)             Objective     Observation/Appearance:     Edema. Measured in centimeters.   4/30/2024 4/30/2024    Right Left   2in. Above elbow     2in. Below elbow     Wrist Crease     Distal Palmar Crease     MCPs       Edema. Measured in centimeters.   4/30/2024 4/30/2024    Right Left   Index:       P1      PIP     P2      DIP     P3     Long:       P1      PIP     P2      DIP     P3     Ring:       P1                 PIP                P2                  DIP     P3     Small:        P1                 PIP            P2             DIP     P3     Thumb:     Prox. Phalanx     IP     Distal Phalanx       Wrist ROM. Measured in degrees.   4/30/2024 4/30/2024    Right Left   Wrist Ext/Flex 75/76 71/76     Hand ROM. Measured in degrees.   4/30/2024  4/30/2024    Right Left        Index: MP  94 96              PIP     104 101              DIP 45 67              GHOSH          Long:  MP 91 94               103              DIP 44 76              GHOSH          Ring:   MP 86 87               105              DIP 39 71              GHOSH          Small:  MP 92 92               PIP 96 99               DIP 36 70              GHOSH          Thumb: MP 75 58                IP 69 61      Strength (Dynamometer) and Pinch Strength (Pinch Gauge)  Measured in pounds.   4/30/2024 4/30/2024    Right Left   Rung  121   Key Pinch 21 16   3pt Pinch 16 14   2pt Pinch 13 11       Manual Muscle Test   4/30/2024 4/30/2024    Right Left   Wrist Extension  4/5 4/5   Wrist Flexion 4/5 4/5         Special Tests  Thumb CMC Grind Test    Finkelstein's Test    Phalen's Test    Tinel's Test    Gustavo's Test    Ash-Littler Test    Digital Collateral Stress Test    ORL Test    Froment's Sign    Pinch OK Sign    Ebony's Sign     Egawa Sign     Clamp Sign     SL Ballottement Test    LT Ballottement Test    Scaphoid/WatsonTest    Linscheid's Test    Metacarpal Stress Test    Piano Key Test    ECU Synergy Test    Ulnar Compression Test    TFCC Load Test    Ulnocarpal Stress Test    Midcarpal Shift Test    Pisiform Boost Test    Elbow Flexion Test    Scratch Collapse Test    Tennis Elbow Test    Resisted Middle Finger Extension Test    Mills Test    Chair Test    Biceps Squeeze Test    Biceps Hook Test    Milking Test    Press Up Manuever    PLRI Test    Valgus Stress Test    Varus Stress Test    Spurling Test    Cervical Distraction Test    ULNTT - General    ULNTT - Median Nerve    ULNTT - Radial Nerve    ULNTT - Ulnar Nerve         CMS Impairment/Limitation/Restriction for FOTO  Survey    Therapist reviewed FOTO scores for Alda Gamboa on 4/30/2024.   FOTO documents entered into [employer] - see Media section.    Limitation Score: 46%         Treatment     Treatment Time In:  1:22  Treatment Time Out: 1:59      Alda received the following supervised modalities after being cleared for contradictions for  minutes:   -N/A    Alda received the following manual therapy techniques for  minutes:   -N/A    Alda received therapeutic exercises for  minutes including:  -N/A    Home Exercise Program/Education:  Issued HEP (see patient instructions in EMR) and educated on modality use for pain management . Exercises were reviewed and Alda was able to demonstrate them prior to the end of the session.   Pt received a written copy of exercises to perform at home. Alda demonstrated good  understanding of the education provided.  Pt was advised to perform these exercises free of pain, and to stop performing them if pain occurs.    Patient/Family Education: role of OT, goals for OT, scheduling/cancellations - pt verbalized understanding. Discussed insurance limitations with patient.    Additional Education provided:     Assessment     Alda Gamboa is a 44 y.o. male referred to outpatient occupational therapy and presents with a medical diagnosis of Carpal tunnel syndrome on both sides , resulting in decreased ROM/strength with increased pain and numbness/tingling and demonstrates limitations as described above. Pt. Seems to have some cmc arthritis of Left thumb. Recommend referral to orthopedic surgeon. Following medical record review it is determined that pt will benefit from occupational therapy services in order to maximize pain free and/or functional use of bilateral UE. The following goals were discussed with the patient and patient is in agreement with them as to be addressed in the treatment plan. The patient's rehab potential is Fair.     Anticipated barriers to occupational therapy:   Pt has no cultural, educational or language barriers to learning provided.        Goals:   The following goals were discussed with the patient and patient is in agreement with them as to be addressed in the  treatment plan.   Short term Goals:  1) Initiate HEP  2) Pt will increase AROM of BUE by 5-10 degrees in order to assist with functional full fist by 4 weeks.  3) Pt will reduce edema by .5-1 cm in affected fingers by 4 weeks.  4) Pt will reduce pain to less than 4/10 by 4 weeks.  5) Pt will increase functional  strength by 5# in order to A in opening containers for med management or home management tasks by 4 weeks.   6) Patient will be able to achieve less than or equal to 46% on the FOTO, demonstrating overall improved functional ability with upper extremity. (Self-care category)    Long Term Goals:  Goals to be met by discharge:  1) Independent with HEP  2) Pt will increase BUE GHOSH by 20-30 degrees in order to increase functional fist for grasp with home management or work related tasks by d/c.   3) Pt will decrease edema to trace or none to increase functional ROM by d/c.   4) Pt will decrease pain to trace or none while completing light home management tasks or work related tasks by d/c.   5) Patient will be able to achieve less than or equal to 46% on the FOTO, demonstrating overall improved functional ability with upper extremity.  (Self-care category)        Plan   Certification Period/Plan of care expiration: 4/30/2024 to 6/25/2024.    Outpatient Occupational Therapy 2 times weekly for 8 weeks to include the following interventions: Paraffin, Fluidotherapy, Manual therapy/joint mobilizations, Modalities for pain management, US 3 mhz, Therapeutic exercises/activities., Iontophoresis with 2.0 cc Dexamethasone, Strengthening, Orthotic Fabrication/Fit/Training, Electrical Modalities, Joint Protection, and Energy Conservation.      ROYA FREITAS, OT

## 2024-05-15 DIAGNOSIS — M45.9 ANKYLOSING SPONDYLITIS, UNSPECIFIED SITE OF SPINE: ICD-10-CM

## 2024-05-15 RX ORDER — IXEKIZUMAB 80 MG/ML
80 INJECTION, SOLUTION SUBCUTANEOUS
Qty: 1 ML | Refills: 6 | OUTPATIENT
Start: 2024-05-15 | End: 2024-11-11

## 2024-05-20 DIAGNOSIS — M45.9 ANKYLOSING SPONDYLITIS, UNSPECIFIED SITE OF SPINE: ICD-10-CM

## 2024-05-20 RX ORDER — IXEKIZUMAB 80 MG/ML
80 INJECTION, SOLUTION SUBCUTANEOUS
Qty: 1 ML | Refills: 6 | Status: CANCELLED | OUTPATIENT
Start: 2024-05-20 | End: 2024-11-16

## 2024-05-22 DIAGNOSIS — M45.9 ANKYLOSING SPONDYLITIS, UNSPECIFIED SITE OF SPINE: ICD-10-CM

## 2024-05-26 ENCOUNTER — PATIENT MESSAGE (OUTPATIENT)
Dept: INTERNAL MEDICINE | Facility: CLINIC | Age: 44
End: 2024-05-26
Payer: COMMERCIAL

## 2024-05-28 RX ORDER — IXEKIZUMAB 80 MG/ML
80 INJECTION, SOLUTION SUBCUTANEOUS
Qty: 1 ML | Refills: 6 | Status: ACTIVE | OUTPATIENT
Start: 2024-05-28 | End: 2024-11-24

## 2024-07-19 DIAGNOSIS — M45.9 ANKYLOSING SPONDYLITIS, UNSPECIFIED SITE OF SPINE: Primary | ICD-10-CM

## 2024-07-19 DIAGNOSIS — M25.541 ARTHRALGIA OF BOTH HANDS: Primary | ICD-10-CM

## 2024-07-19 DIAGNOSIS — M25.542 ARTHRALGIA OF BOTH HANDS: Primary | ICD-10-CM

## 2024-07-19 RX ORDER — DICLOFENAC SODIUM 75 MG/1
75 TABLET, DELAYED RELEASE ORAL 2 TIMES DAILY PRN
Qty: 90 TABLET | Refills: 2 | OUTPATIENT
Start: 2024-07-19

## 2024-07-19 RX ORDER — DICLOFENAC SODIUM 75 MG/1
75 TABLET, DELAYED RELEASE ORAL 2 TIMES DAILY PRN
Qty: 180 TABLET | Refills: 3 | Status: SHIPPED | OUTPATIENT
Start: 2024-07-19

## 2024-08-16 NOTE — PROGRESS NOTES
43-year-old male patient presents ambulatory orthopedic clinic valuation of his left hand.  He was known to Dr. Pelayo having undergone carpal tunnel release surgery at the left wrist on 12/13/2023.  He reports resolution of pain symptoms.  He does have a Band-Aid covering his wound at this time.  He states he had surgical dressing on up until yesterday.  He had a flat tire and change in the flat he got the dressing wet.      PE:  Physical exam left hand he was noted to have large Band-Aid in place.  Band-Aid removed.  Skin is clean and dry.  Suture site is healing well without sign or symptom of infection.  Edges are well approximated.  Sutures are noted.  He was able to clinic tight fist.  He was able to fully extend all digits left hand.  He was able to fully oppose base of the left little finger with his left thumb.      Impression:  2 weeks following carpal tunnel release wrist-left    Plan:  Safety guidelines and activity restrictions are discussed with the patient.  Verbalized understanding.  Sutures removed Steri-Strips applied.  Safety guidelines and activity restrictions discussed with the patient.  Verbalizes understanding.  Cautioned to keep wound clean and dry.  Cautioned to avoid heavy lifting pushing or pulling or forceful  with his left hand until follow-up with Dr. Pelayo.  Return to orthopedic clinic when you return from her vacation in follow-up with Dr. Pelayo or sooner as needed.     History & Physical Examination    Patient Name: Donna Robison  MRN: [id number]  CSN: [id number]  YOB: 1975    Diagnosis: colorectal cancer screening    Medications Prior to Admission   Medication Sig Dispense Refill Last Dose    dapagliflozin (FARXIGA) 10 MG Oral Tab Take 1 tablet (10 mg total) by mouth daily. 90 tablet 1 2024    atorvastatin 20 MG Oral Tab Take 1 tablet (20 mg total) by mouth nightly. 90 tablet 1 2024    metFORMIN HCl 1000 MG Oral Tab Take 1 tablet (1,000 mg total) by mouth 2 (two) times daily with meals. 180 tablet 1 2024    Lancets Does not apply Misc Use with glucometer, check BG 2 times daily. 100 each 0     Blood Gluc Meter Disp-Strips Does not apply Device Use with glucometer to check BG 2 times daily. 100 each 0     Blood Glucose Monitoring Suppl Does not apply Device Use glucometer to check blood glucose in am, fasting and 2 hrs after meal. 1 each 0      Current Facility-Administered Medications   Medication Dose Route Frequency    lactated ringers infusion   Intravenous Continuous       Allergies: No Known Allergies    Past Medical History:    Diabetes (HCC)    Diabetes mellitus (HCC)     Past Surgical History:   Procedure Laterality Date    Appendectomy            , ,     Colonoscopy  2024    D & c      Hernia surgery      umbilical    Laparoscopic salpingostomy       Family History   Problem Relation Age of Onset    No Known Problems Father     Depression Mother     Hypertension Mother     No Known Problems Son     Asthma Son     No Known Problems Sister     No Known Problems Brother     Breast Cancer Neg     Uterine Cancer Neg     Ovarian Cancer Neg      Social History     Tobacco Use    Smoking status: Never    Smokeless tobacco: Never   Substance Use Topics    Alcohol use: Yes     Comment: social       SYSTEM Check if Physical Exam is Normal If not normal, please explain:   HEENT [ X]    NECK  [ X]    HEART [ X]    LUNGS [ X]     ABDOMEN [ X]    EXTREMITIES [ X]      General:awake, cooperative, no acute distress  HEENT: EOMI, no scleral icterus, MMM; oral pharnyx is without exudates or lesions  Neck: no lymphadenopathy; thyroid is not enlarged and without nodules  CV: RRR  Resp: non-labored breathing  Abd: soft, non-tender, non-distended  Ext: no lower extremity swelling  Neuro: Alert, Oriented X 3  Skin: no rashes, bruises  Psych: normal affect  I have discussed the risks and benefits and alternatives of the procedure with the patient/family.  She understands and agreed to proceed with plan of care.   Nicholas Davis MD  [facility] - Gastroenterology  8/16/2024  2:31 PM

## 2024-09-20 ENCOUNTER — PATIENT OUTREACH (OUTPATIENT)
Facility: HOSPITAL | Age: 44
End: 2024-09-20
Payer: COMMERCIAL

## 2024-09-20 NOTE — PROGRESS NOTES
Population Health Chart Review & Patient Outreach Details      Further Action Needed If Patient Returns Outreach:            Updates Requested / Reviewed:     []  Care Everywhere    []     []  External Sources ([facility], [facility], [facility], etc.)    [] [facility]   [] [facility]   [] Other:    []  Care Team Updated   []  Removed  or Duplicate Orders   []  Immunization Reconciliation Completed / Queried    [] [place]   [] [place]   [] [place]   [] [place]      Health Maintenance Topics Addressed and Outreach Outcomes / Actions Taken:             Breast Cancer Screening []  Mammogram Order Placed    []  Mammogram Screening Scheduled    []  External Records Requested & Care Team Updated if Applicable    []  External Records Uploaded & Care Team Updated if Applicable    []  Pt Declined Scheduling Mammogram    []  Pt Will Schedule with External Provider / Order Routed & Care Team Updated if Applicable              Cervical Cancer Screening []  Pap Smear Scheduled in Primary Care or OBGYN    []  External Records Requested & Care Team Updated if Applicable       []  External Records Uploaded, Care Team Updated, & History Updated if Applicable    []  Patient Declined Scheduling Pap Smear    []  Patient Will Schedule with External Provider & Care Team Updated if Applicable                  Colorectal Cancer Screening []  Colonoscopy Case Request / Referral / Home Test Order Placed    []  External Records Requested & Care Team Updated if Applicable    []  External Records Uploaded, Care Team Updated, & History Updated if Applicable    []  Patient Declined Completing Colon Cancer Screening    []  Patient Will Schedule with External Provider & Care Team Updated if Applicable    []  Fit Kit Mailed (add the SmartPhrase under additional notes)    []  Reminded Patient to Complete Home Test                Diabetic Eye Exam []  Eye Exam Screening Order Placed    []  Eye Camera Scheduled or Optometry/Ophthalmology Referral  Placed    []  External Records Requested & Care Team Updated if Applicable    []  External Records Uploaded, Care Team Updated, & History Updated if Applicable    []  Patient Declined Scheduling Eye Exam    []  Patient Will Schedule with External Provider & Care Team Updated if Applicable             Blood Pressure Control []  Primary Care Follow Up Visit Scheduled     []  Remote Blood Pressure Reading Captured    []  Patient Declined Remote Reading or Scheduling Appt - Escalated to PCP    []  Patient Will Call Back or Send Portal Message with Reading                 HbA1c & Other Labs []  Overdue Lab(s) Ordered    []  Overdue Lab(s) Scheduled    []  External Records Uploaded & Care Team Updated if Applicable    []  Primary Care Follow Up Visit Scheduled     []  Reminded Patient to Complete A1c Home Test    []  Patient Declined Scheduling Labs or Will Call Back to Schedule    []  Patient Will Schedule with External Provider / Order Routed, & Care Team Updated if Applicable           Primary Care Appointment []  Primary Care Appt Scheduled    []  Patient Declined Scheduling or Will Call Back to Schedule    []  Pt Established with External Provider, Updated Care Team, & Informed Pt to Notify Payor if Applicable           Medication Adherence /    Statin Use []  Primary Care Appointment Scheduled    []  Patient Reminded to  Prescription    []  Patient Declined, Provider Notified if Needed    []  Sent Provider Message to Review to Evaluate Pt for Statin, Add Exclusion Dx Codes, Document   Exclusion in Problem List, Change Statin Intensity Level to Moderate or High Intensity if Applicable                Osteoporosis Screening []  Dexa Order Placed    []  Dexa Appointment Scheduled    []  External Records Requested & Care Team Updated    []  External Records Uploaded, Care Team Updated, & History Updated if Applicable    []  Patient Declined Scheduling Dexa or Will Call Back to Schedule    []  Patient Will Schedule  with External Provider / Order Routed & Care Team Updated if Applicable       Additional Notes:.  Pt needs appt for missed [facility] #1of2 9/20/24 sent to [facility] to schedule via staff message.

## 2024-10-15 DIAGNOSIS — F32.A DEPRESSION, UNSPECIFIED DEPRESSION TYPE: ICD-10-CM

## 2024-10-16 RX ORDER — SERTRALINE HYDROCHLORIDE 100 MG/1
TABLET, FILM COATED ORAL
Qty: 90 TABLET | Refills: 3 | Status: SHIPPED | OUTPATIENT
Start: 2024-10-16

## 2024-12-02 DIAGNOSIS — M45.9 ANKYLOSING SPONDYLITIS, UNSPECIFIED SITE OF SPINE: ICD-10-CM

## 2024-12-02 RX ORDER — IXEKIZUMAB 80 MG/ML
80 INJECTION, SOLUTION SUBCUTANEOUS
Qty: 1 ML | Refills: 6 | Status: ACTIVE | OUTPATIENT
Start: 2024-12-02 | End: 2025-05-31

## 2024-12-02 RX ORDER — IXEKIZUMAB 80 MG/ML
80 INJECTION, SOLUTION SUBCUTANEOUS
Qty: 1 ML | Refills: 6 | Status: CANCELLED | OUTPATIENT
Start: 2024-12-02 | End: 2025-05-31

## 2024-12-20 ENCOUNTER — OFFICE VISIT (OUTPATIENT)
Dept: RHEUMATOLOGY | Facility: CLINIC | Age: 44
End: 2024-12-20
Payer: COMMERCIAL

## 2024-12-20 VITALS
HEART RATE: 65 BPM | WEIGHT: 177.94 LBS | BODY MASS INDEX: 24.1 KG/M2 | HEIGHT: 72 IN | SYSTOLIC BLOOD PRESSURE: 132 MMHG | DIASTOLIC BLOOD PRESSURE: 84 MMHG

## 2024-12-20 DIAGNOSIS — M54.2 CERVICALGIA: ICD-10-CM

## 2024-12-20 DIAGNOSIS — D84.821 DRUG-INDUCED IMMUNODEFICIENCY: ICD-10-CM

## 2024-12-20 DIAGNOSIS — Z79.899 DRUG-INDUCED IMMUNODEFICIENCY: ICD-10-CM

## 2024-12-20 DIAGNOSIS — M25.50 ARTHRALGIA, UNSPECIFIED JOINT: ICD-10-CM

## 2024-12-20 DIAGNOSIS — M45.9 ANKYLOSING SPONDYLITIS, UNSPECIFIED SITE OF SPINE: Primary | ICD-10-CM

## 2024-12-20 PROCEDURE — 99999 PR PBB SHADOW E&M-EST. PATIENT-LVL IV: CPT | Mod: PBBFAC,,, | Performed by: STUDENT IN AN ORGANIZED HEALTH CARE EDUCATION/TRAINING PROGRAM

## 2024-12-20 RX ORDER — TIZANIDINE 4 MG/1
4 TABLET ORAL NIGHTLY PRN
Qty: 30 TABLET | Refills: 5 | Status: SHIPPED | OUTPATIENT
Start: 2024-12-20 | End: 2025-06-18

## 2024-12-20 RX ORDER — DIAZEPAM 5 MG/1
5 TABLET ORAL
Qty: 1 TABLET | Refills: 0 | Status: SHIPPED | OUTPATIENT
Start: 2024-12-20 | End: 2025-01-19

## 2024-12-20 RX ORDER — KETOROLAC TROMETHAMINE 30 MG/ML
30 INJECTION, SOLUTION INTRAMUSCULAR; INTRAVENOUS
Status: COMPLETED | OUTPATIENT
Start: 2024-12-20 | End: 2024-12-20

## 2024-12-20 RX ADMIN — KETOROLAC TROMETHAMINE 30 MG: 30 INJECTION, SOLUTION INTRAMUSCULAR; INTRAVENOUS at 03:12

## 2024-12-20 NOTE — PROGRESS NOTES
12/19/2024    11:29 PM   Rapid3 Question Responses and Scores   MDHAQ Score 1.5   Psychologic Score 7.7   Pain Score 7   When you awakened in the morning OVER THE LAST WEEK, did you feel stiff? Yes   If Yes, please indicate the number of hours until you are as limber as you will be for the day 2   Fatigue Score 8   Global Health Score 10   RAPID3 Score 7.33

## 2024-12-20 NOTE — PATIENT INSTRUCTIONS
Stop flexeril and start tizanidine  Continue diclofenac pills twice a day  Started process for bimekizumab (bimzelx) injection once a month.

## 2024-12-20 NOTE — PROGRESS NOTES
"       RHEUMATOLOGY OUTPATIENT CLINIC NOTE    12/20/2024    Attending Rheumatologist: Lou Ramirez  Primary Care Provider: New Osman MD   Physician Requesting Consultation: New Osman MD  [address] [facility]  [place] [id number]  Chief Complaint/Reason For Consultation:  Inflammatory polyarthritis      Subjective:       HPI  Alda Gamboa is a 44 y.o. White male who comes for evaluation of AS.     Records reviewed    He has a long standing history of back pain since teenager years. He was seen by rheumatologist about 7 years and diagnosed with AS on the basis on positive HLA-B27 and abnormal MRI lumbar spine and features of IBP. MTX causes upset stomach. Failed and tried Humira, Cimzia. He reports that after a year both medications started wearing off  He is currently on Taltz 80 mg every 28 days for the past 2 years. He noticed that his Taltz is lasting only about 2 weeks.   Most recently in January 2024, after he did heavy lifting with right hand and he felt that his wrist was dislocated. He had swelling and change in discoloration in right wrist and pain was very severe. Symptoms are resolved.     He also reports intermittent episodes of eye redness, eye pain, blurry vision.   He has had episodes of joint pain. Right knee, elbows had been swollen in the past.   He reports that he is "double jointed" his whole life   He denies any psoriasis. Denies any skin rashes.     Also has history of bilateral carpal tunnel syndrome diagnosed with EMG/NCS. He had left sided carpal tunnel release but he developed weakness afterwards so he does not want to do release on the right side          Family history of chrohn's disease in cousin    Labs  4/2024  COURTNEY negative  RF, CCP negative  CRP normal    Review of Systems   Constitutional:  Negative for fever.   HENT:  Positive for trouble swallowing. Negative for mouth sores.    Eyes:  Negative for redness.   Respiratory:  " Positive for shortness of breath. Negative for cough.    Cardiovascular:  Negative for chest pain.   Gastrointestinal:  Negative for constipation and diarrhea.   Neurological:  Positive for headaches.   Hematological:  Does not bruise/bleed easily.        Chronic comorbid conditions affecting medical decision making today:  Past Medical History:   Diagnosis Date    Acute bacterial prostatitis 05/31/2022    Positive Cx for UTI with scant hematuria Treated with 10 day course of Bactrim DS, completed yesterday    Allergy     Ankylosing spondylitis 05/31/2022    Patient takes immunosuppression - recently changed to Cimzia, Taltz Followed by Dr. Medel for this    Back pain     Elevated prostate specific antigen (PSA) 08/12/2022    Lab Results Component Value Date  PSA 7.600 (H) 08/10/2022  PSA 8.580 (H) 07/15/2022 s/p month of antx for tx of acute exacerbation of chronic prostatitis    Neck pain     Spondylosis      Past Surgical History:   Procedure Laterality Date    CARPAL TUNNEL RELEASE Left 12/13/2023    Procedure: RELEASE, CARPAL TUNNEL;  Surgeon: Jason Pelayo III, MD;  Location: [facility];  Service: Orthopedics;  Laterality: Left;    CYST REMOVAL Right     shoulder    HERNIA REPAIR      LUMBAR DISCECTOMY  02/2018    L 4 L5    TONSILLECTOMY       Family History   Problem Relation Name Age of Onset    Pacemaker/defibrilator Mother      Lung cancer Father      Cancer Maternal Grandfather      Crohn's disease Maternal Cousin       Social History     Substance and Sexual Activity   Alcohol Use Not Currently     Social History     Tobacco Use   Smoking Status Former    Passive exposure: Past   Smokeless Tobacco Never   Tobacco Comments    quit smoking at age 26, started at 12     Social History     Substance and Sexual Activity   Drug Use Not Currently       Current Outpatient Medications:     diclofenac (VOLTAREN) 75 MG EC tablet, Take 1 tablet (75 mg total) by mouth 2 (two) times daily as needed (as  needed)., Disp: 180 tablet, Rfl: 3    pantoprazole (PROTONIX) 40 MG tablet, Take 1 tablet (40 mg total) by mouth once daily., Disp: 90 tablet, Rfl: 3    sertraline (ZOLOFT) 100 MG tablet, Take one-half (1/2) tablet by mouth in the morning for 7 days, then take one (1) tablet daily., Disp: 90 tablet, Rfl: 3    tadalafiL (CIALIS) 20 MG Tab, Take 1 tablet (20 mg total) by mouth once daily., Disp: 10 tablet, Rfl: 11    testosterone (TESTIM) 50 mg/5 gram (1 %) Gel, Apply 2-3 drops topically once daily., Disp: 2 each, Rfl: 2    bimekizumab-bkzx 160 mg/mL AtIn, Inject 160 mg into the skin every 28 days., Disp: 1 mL, Rfl: 11    tiZANidine (ZANAFLEX) 4 MG tablet, Take 1 tablet (4 mg total) by mouth nightly as needed (muscle spams)., Disp: 30 tablet, Rfl: 5    Current Facility-Administered Medications:     ketorolac injection 30 mg, 30 mg, Intramuscular, 1 time in Clinic/HOD,      Objective:         Vitals:    12/20/24 1420   BP: 132/84   Pulse: 65     Physical Exam   Constitutional: He is oriented to person, place, and time. He appears well-developed.   HENT:   Head: Normocephalic.   Cardiovascular: Normal rate and normal heart sounds.   Pulmonary/Chest: Effort normal and breath sounds normal.   Abdominal: Soft.   Musculoskeletal:      Cervical back: Neck supple.      Comments: Cspine FROM spinal tenderness  Tspine FROM no tenderness  Lspine FROM no tenderness.  Bilateral SI tenderness  TMJ: unremarkable  Shoulders: FROM; no synovitis;  Elbows: FROM; no synovitis; no tophi or nodules  Wrists: FROM; no synovitis;    MCPs: FROM; no synovitis;   PIPs:FROM; no synovitis;   DIPs: FROM; no synovitis;   HIPS: FROM  Knees: FROM; no synovitis; no instability  Ankles: FROM: no synovitis   Toes: no tenderness on palpation.     Lymphadenopathy:     He has no cervical adenopathy.   Neurological: He is alert and oriented to person, place, and time.   Skin: No rash noted.   Vitals reviewed.    Reviewed old and all outside pertinent medical  "records available.    All lab results personally reviewed and interpreted by me.  Lab Results   Component Value Date    WBC 6.25 12/07/2023    HGB 13.6 12/07/2023    HCT 39.9 (L) 12/07/2023    MCV 88.7 12/07/2023    MCH 30.2 12/07/2023    MCHC 34.1 12/07/2023    RDW 12.3 12/07/2023     12/07/2023    MPV 9.3 (L) 12/07/2023    NEUTROABS 2.71 12/07/2023    MONOABS 0.54 12/07/2023    DIFFTYPE Auto 12/07/2023       Lab Results   Component Value Date     12/07/2023    K 4.1 12/07/2023     12/07/2023    CO2 32 12/07/2023     12/07/2023    BUN 21 (H) 12/07/2023    CALCIUM 9.2 12/07/2023    PROT 7.1 12/07/2023    ALBUMIN 3.9 12/07/2023    BILITOT 0.3 12/07/2023    AST 17 12/07/2023    ALKPHOS 85 12/07/2023    ALT 36 12/07/2023       Lab Results   Component Value Date    COLORU Light-Yellow 08/12/2022    APPEARANCEUA Clear 08/12/2022    SPECGRAV 1.016 08/12/2022    PHUR 5.5 08/12/2022    PROTEINUA Negative 08/12/2022    KETONESU Negative 08/12/2022    LEUKOCYTESUR Negative 08/12/2022    NITRITE Negative 08/12/2022    UROBILINOGEN Normal 08/12/2022       Lab Results   Component Value Date    CRP <0.29 04/17/2024       Lab Results   Component Value Date    RF <10 04/17/2024    CCPANTIBODIE <16.0 04/17/2024       No components found for: "25OHVITDTOT", "90WUXGDU5", "39UXOIYU5", "METHODNOTE"    No results found for: "URICACID"    Lab Results   Component Value Date    HEPCAB Non-Reactive 08/10/2022     Imaging:  All imaging reviewed and independently interpreted by me.     ASSESSMENT / PLAN:     Alda Gamboa is a 44 y.o. White male with:    1. Ankylosing spondylitis, unspecified site of spine  -diagnosed about 7 years ago on the basis positive HLA-B27 and abnormal MRI lumbar spine and features of IBP. MTX causes upset stomach. Failed and tried Humira, Cimzia. He reports that after a year both medications started wearing off. He is currently on Taltz 80 mg every 28 days for the past 2 years. He " noticed that his Taltz is lasting only about 2 weeks.   -at this moment, I think it is reasonable to change therapy. Discussed about starting bimekizumab SC every 28 days. Discussed similar SE profile than Taltz. Discussed black box warnings. Patient agreeable with start medication   -obtain predmard labs now  -due to significant pain, will proceed with toradol IM now  -continue diclofenack 75 mg PO Q12h PRN. Avoid tonight dose due to toradol     2. Cervicalgia (Primary)  -besides AS, it appears he also has degenerative changes from previous MRIs but I do not have records and they are from 2019. He has been having worsening neck pain which bothers him significantly. Sent valium 1 tablet on call for MRI  -will proceed with MRI cervical spine now.   -change muscle relaxant. Stop flexeril and start tizanidine 4 mg at night, may increase dose in the future depending on response.   -start OTC lidocaine patches    3. Drug-induced immunodeficiency  - recent labs reviewed  - no live vaccines  - vaccines per guidelines   - immunosuppression/infectious precautions reinforced     4. Arthralgias  -I also think there is a component of hypermobility since teenage years. We discussed nature and prognosis. I also discussed with patient that I do not think AS accounts for all his pain and likely need multi modal pain therapy.     Follow up in about 3 months (around 3/20/2025).    Method of contact with patient concerns: [facility]    Disclaimer:  This note is prepared using voice recognition software and as such is likely to have errors and has not been proof read. Please contact me for questions.     Time spent: 55 minutes in face to face discussion concerning diagnosis, prognosis, review of lab and test results, benefits of treatment as well as management of disease, counseling of patient and coordination of care between various health care providers.      Lou Ramirez M.D.  Rheumatology  [facility]

## 2024-12-24 DIAGNOSIS — M45.9 ANKYLOSING SPONDYLITIS, UNSPECIFIED SITE OF SPINE: Primary | ICD-10-CM

## 2024-12-24 DIAGNOSIS — D84.821 DRUG-INDUCED IMMUNODEFICIENCY: ICD-10-CM

## 2024-12-24 DIAGNOSIS — Z79.899 DRUG-INDUCED IMMUNODEFICIENCY: ICD-10-CM

## 2024-12-26 ENCOUNTER — PATIENT MESSAGE (OUTPATIENT)
Dept: RHEUMATOLOGY | Facility: CLINIC | Age: 44
End: 2024-12-26
Payer: COMMERCIAL

## 2025-01-09 ENCOUNTER — PATIENT MESSAGE (OUTPATIENT)
Dept: RHEUMATOLOGY | Facility: CLINIC | Age: 45
End: 2025-01-09
Payer: COMMERCIAL

## 2025-01-10 ENCOUNTER — PATIENT MESSAGE (OUTPATIENT)
Dept: RHEUMATOLOGY | Facility: CLINIC | Age: 45
End: 2025-01-10
Payer: COMMERCIAL

## 2025-01-26 DIAGNOSIS — M54.2 CERVICALGIA: ICD-10-CM

## 2025-01-26 DIAGNOSIS — M45.9 ANKYLOSING SPONDYLITIS, UNSPECIFIED SITE OF SPINE: ICD-10-CM

## 2025-01-27 RX ORDER — PANTOPRAZOLE SODIUM 40 MG/1
40 TABLET, DELAYED RELEASE ORAL DAILY
Qty: 90 TABLET | Refills: 3 | Status: SHIPPED | OUTPATIENT
Start: 2025-01-27

## 2025-01-27 RX ORDER — TIZANIDINE 4 MG/1
4 TABLET ORAL NIGHTLY PRN
Qty: 30 TABLET | Refills: 5 | OUTPATIENT
Start: 2025-01-27 | End: 2025-07-26

## 2025-04-08 ENCOUNTER — OFFICE VISIT (OUTPATIENT)
Dept: RHEUMATOLOGY | Facility: CLINIC | Age: 45
End: 2025-04-08
Payer: COMMERCIAL

## 2025-04-08 ENCOUNTER — TELEPHONE (OUTPATIENT)
Dept: RHEUMATOLOGY | Facility: CLINIC | Age: 45
End: 2025-04-08
Payer: COMMERCIAL

## 2025-04-08 ENCOUNTER — PATIENT OUTREACH (OUTPATIENT)
Facility: HOSPITAL | Age: 45
End: 2025-04-08
Payer: COMMERCIAL

## 2025-04-08 ENCOUNTER — PATIENT MESSAGE (OUTPATIENT)
Dept: RHEUMATOLOGY | Facility: CLINIC | Age: 45
End: 2025-04-08

## 2025-04-08 VITALS
BODY MASS INDEX: 23.09 KG/M2 | DIASTOLIC BLOOD PRESSURE: 78 MMHG | SYSTOLIC BLOOD PRESSURE: 121 MMHG | HEART RATE: 65 BPM | HEIGHT: 72 IN | WEIGHT: 170.44 LBS

## 2025-04-08 DIAGNOSIS — M45.9 ANKYLOSING SPONDYLITIS, UNSPECIFIED SITE OF SPINE: Primary | ICD-10-CM

## 2025-04-08 DIAGNOSIS — M25.50 ARTHRALGIA, UNSPECIFIED JOINT: ICD-10-CM

## 2025-04-08 DIAGNOSIS — M54.2 CERVICALGIA: ICD-10-CM

## 2025-04-08 DIAGNOSIS — D84.821 DRUG-INDUCED IMMUNODEFICIENCY: ICD-10-CM

## 2025-04-08 DIAGNOSIS — Z79.899 DRUG-INDUCED IMMUNODEFICIENCY: ICD-10-CM

## 2025-04-08 PROCEDURE — 99999 PR PBB SHADOW E&M-EST. PATIENT-LVL IV: CPT | Mod: PBBFAC,,, | Performed by: STUDENT IN AN ORGANIZED HEALTH CARE EDUCATION/TRAINING PROGRAM

## 2025-04-08 RX ORDER — KETOROLAC TROMETHAMINE 30 MG/ML
30 INJECTION, SOLUTION INTRAMUSCULAR; INTRAVENOUS
Status: COMPLETED | OUTPATIENT
Start: 2025-04-08 | End: 2025-04-08

## 2025-04-08 RX ORDER — PREGABALIN 50 MG/1
50 CAPSULE ORAL 3 TIMES DAILY
Qty: 90 CAPSULE | Refills: 5 | Status: SHIPPED | OUTPATIENT
Start: 2025-04-08 | End: 2025-10-07

## 2025-04-08 RX ADMIN — KETOROLAC TROMETHAMINE 30 MG: 30 INJECTION, SOLUTION INTRAMUSCULAR; INTRAVENOUS at 02:04

## 2025-04-08 NOTE — PROGRESS NOTES
Population Health Review...  Per [facility] website, insurance is active and pt is listed on the attributed list needing a healthy you performed in 2025  HY scheduled for 4/15/2025

## 2025-04-08 NOTE — TELEPHONE ENCOUNTER
----- Message from Johana sent at 4/8/2025 11:59 AM CDT -----  Type:  Needs Medical AdviceWho Called: PtWould the patient rather a call back or a response via [facility]? CallBest Call Back Number:   [phone] Information: Pt states he is on his way to his appt.

## 2025-04-08 NOTE — PROGRESS NOTES
"       RHEUMATOLOGY OUTPATIENT CLINIC NOTE    4/8/2025    Attending Rheumatologist: Lou Ramirez  Primary Care Provider: New Osman MD   Physician Requesting Consultation: No referring provider defined for this encounter.  Chief Complaint/Reason For Consultation:  Follow-up      Subjective:       HPI  Alda Gamboa is a 45 y.o. White male who comes for evaluation of AS.     TODAY 4/2025  -initial visit on 12/2024  -he has been on Bimzelx since end of January. He has received 3 injections so far. He feels that his symptoms have improved overall but still had pain. He feels that he is used to back pain now. He also has recent pain in left 1st CMC and right elbow, without any trauma.   -he takes tizanidine 4 mg at night and helps him sleep. He tried gabapentin and caused GI upset, lyrica made him very sleepy. Cymbalta caused petechiae rash.      Records reviewed    He has a long standing history of back pain since teenager years. He was seen by rheumatologist about 7 years and diagnosed with AS on the basis on positive HLA-B27 and abnormal MRI lumbar spine and features of IBP. MTX causes upset stomach. Failed and tried Humira, Cimzia. He reports that after a year both medications started wearing off  He is currently on Taltz 80 mg every 28 days for the past 2 years. He noticed that his Taltz is lasting only about 2 weeks.   Most recently in January 2024, after he did heavy lifting with right hand and he felt that his wrist was dislocated. He had swelling and change in discoloration in right wrist and pain was very severe. Symptoms are resolved.     He also reports intermittent episodes of eye redness, eye pain, blurry vision.   He has had episodes of joint pain. Right knee, elbows had been swollen in the past.   He reports that he is "double jointed" his whole life   He denies any psoriasis. Denies any skin rashes.     Also has history of bilateral carpal tunnel syndrome diagnosed with EMG/NCS. He " had left sided carpal tunnel release but he developed weakness afterwards so he does not want to do release on the right side          Family history of chrohn's disease in cousin    Labs  4/2024  COURTNEY negative  RF, CCP negative  CRP normal    Review of Systems   Constitutional:  Positive for unexpected weight change. Negative for fever.   HENT:  Positive for trouble swallowing. Negative for mouth sores.    Eyes:  Negative for redness.   Respiratory:  Positive for shortness of breath. Negative for cough.    Cardiovascular:  Negative for chest pain.   Gastrointestinal:  Negative for constipation and diarrhea.   Genitourinary:  Negative for genital sores.   Integumentary:  Negative for rash.   Neurological:  Positive for headaches.   Hematological:  Does not bruise/bleed easily.        Chronic comorbid conditions affecting medical decision making today:  Past Medical History:   Diagnosis Date    Acute bacterial prostatitis 05/31/2022    Positive Cx for UTI with scant hematuria Treated with 10 day course of Bactrim DS, completed yesterday    Allergy     Ankylosing spondylitis 05/31/2022    Patient takes immunosuppression - recently changed to Cimzia, Taltz Followed by Dr. Medel for this    Back pain     Elevated prostate specific antigen (PSA) 08/12/2022    Lab Results Component Value Date  PSA 7.600 (H) 08/10/2022  PSA 8.580 (H) 07/15/2022 s/p month of antx for tx of acute exacerbation of chronic prostatitis    Neck pain     Spondylosis      Past Surgical History:   Procedure Laterality Date    CARPAL TUNNEL RELEASE Left 12/13/2023    Procedure: RELEASE, CARPAL TUNNEL;  Surgeon: Jason Pelayo III, MD;  Location: [facility];  Service: Orthopedics;  Laterality: Left;    CYST REMOVAL Right     shoulder    HERNIA REPAIR      LUMBAR DISCECTOMY  02/2018    L 4 L5    TONSILLECTOMY       Family History   Problem Relation Name Age of Onset    Pacemaker/defibrilator Mother      Lung cancer Father      Cancer Maternal  Grandfather      Crohn's disease Maternal Cousin       Social History     Substance and Sexual Activity   Alcohol Use Not Currently     Social History     Tobacco Use   Smoking Status Former    Passive exposure: Past   Smokeless Tobacco Never   Tobacco Comments    quit smoking at age 26, started at 12     Social History     Substance and Sexual Activity   Drug Use Not Currently       Current Outpatient Medications:     bimekizumab-bkzx 160 mg/mL AtIn, Inject 160 mg into the skin every 28 days., Disp: 1 mL, Rfl: 11    diclofenac (VOLTAREN) 75 MG EC tablet, Take 1 tablet (75 mg total) by mouth 2 (two) times daily as needed (as needed)., Disp: 180 tablet, Rfl: 3    pantoprazole (PROTONIX) 40 MG tablet, Take 1 tablet (40 mg total) by mouth once daily., Disp: 90 tablet, Rfl: 3    sertraline (ZOLOFT) 100 MG tablet, Take one-half (1/2) tablet by mouth in the morning for 7 days, then take one (1) tablet daily., Disp: 90 tablet, Rfl: 3    tadalafiL (CIALIS) 20 MG Tab, Take 1 tablet (20 mg total) by mouth once daily., Disp: 10 tablet, Rfl: 11    testosterone (TESTIM) 50 mg/5 gram (1 %) Gel, Apply 2-3 drops topically once daily., Disp: 2 each, Rfl: 2    tiZANidine (ZANAFLEX) 4 MG tablet, Take 1 tablet (4 mg total) by mouth nightly as needed (muscle spams)., Disp: 30 tablet, Rfl: 5    pregabalin (LYRICA) 50 MG capsule, Take 1 capsule (50 mg total) by mouth 3 times daily., Disp: 90 capsule, Rfl: 5    Current Facility-Administered Medications:     ketorolac injection 30 mg, 30 mg, Intramuscular, 1 time in Clinic/HOD,      Objective:         Vitals:    04/08/25 1321   BP: 121/78   Pulse: 65     Physical Exam   Constitutional: He is oriented to person, place, and time. He appears well-developed.   HENT:   Head: Normocephalic.   Cardiovascular: Normal rate and normal heart sounds.   Pulmonary/Chest: Effort normal and breath sounds normal.   Abdominal: Soft.   Musculoskeletal:      Cervical back: Neck supple.      Comments: Vera  "FROM spinal tenderness  Tspine FROM no tenderness  Lspine FROM no tenderness.  Bilateral SI tenderness  TMJ: unremarkable  Shoulders: FROM; no synovitis;  Elbows: FROM; no synovitis; no tophi or nodules  Wrists: FROM; no synovitis;    MCPs: FROM; no synovitis;   PIPs:FROM; no synovitis;   DIPs: FROM; no synovitis;   HIPS: FROM  Knees: FROM; no synovitis; no instability  Ankles: FROM: no synovitis   Toes: no tenderness on palpation.     Lymphadenopathy:     He has no cervical adenopathy.   Neurological: He is alert and oriented to person, place, and time.   Skin: No rash noted.   Vitals reviewed.    Reviewed old and all outside pertinent medical records available.    All lab results personally reviewed and interpreted by me.  Lab Results   Component Value Date    WBC 7.97 01/09/2025    HGB 14.6 01/09/2025    HCT 44.6 01/09/2025    MCV 89.6 01/09/2025    MCH 29.3 01/09/2025    MCHC 32.7 01/09/2025    RDW 12.3 01/09/2025     01/09/2025    MPV 9.5 01/09/2025    NEUTROABS 4.28 01/09/2025    MONOABS 0.61 01/09/2025    DIFFTYPE Auto 01/09/2025       Lab Results   Component Value Date     01/09/2025    K 4.2 01/09/2025     01/09/2025    CO2 29 01/09/2025    GLU 91 01/09/2025    BUN 14 01/09/2025    CALCIUM 9.1 01/09/2025    PROT 7.2 01/09/2025    ALBUMIN 4.5 01/09/2025    BILITOT 1.1 01/09/2025    AST 24 01/09/2025    ALKPHOS 89 01/09/2025    ALT 21 01/09/2025       Lab Results   Component Value Date    COLORU Light-Yellow 08/12/2022    APPEARANCEUA Clear 08/12/2022    SPECGRAV 1.016 08/12/2022    PHUR 5.5 08/12/2022    PROTEINUA Negative 08/12/2022    KETONESU Negative 08/12/2022    LEUKOCYTESUR Negative 08/12/2022    NITRITE Negative 08/12/2022    UROBILINOGEN Normal 08/12/2022       Lab Results   Component Value Date    CRP <0.29 04/17/2024       Lab Results   Component Value Date    RF <10 04/17/2024    CCPANTIBODIE <16.0 04/17/2024       No components found for: "25OHVITDTOT", "19ABWFXG8", " ""23CVFMRM4", "METHODNOTE"    No results found for: "URICACID"    Lab Results   Component Value Date    HEPBSAB Non-Reactive 12/23/2024    HEPBSAG Non-Reactive 12/23/2024    HEPCAB Non-Reactive 12/23/2024    HEPCAB Non-Reactive 08/10/2022     Imaging:  All imaging reviewed and independently interpreted by me.     ASSESSMENT / PLAN:     Alda Gamboa is a 45 y.o. White male with:    1. Ankylosing spondylitis, unspecified site of spine  -diagnosed about 7 years ago on the basis positive HLA-B27 and abnormal MRI lumbar spine and features of IBP. MTX causes upset stomach. Failed and tried Humira, Cimzia. He reports that after a year both medications started wearing off. Taltz wore off after 2 years of therapy.   -currently on Bimzelx every 28 days. It has helped his AS. Continue it.   -due to significant pain, will proceed with toradol IM now  -continue diclofenack 75 mg PO Q12h PRN. Avoid tonight dose due to toradol   -referral to PT for aquatherapy    2. Cervicalgia (Primary)  -besides AS, it appears he also has degenerative changes from previous MRIs but I do not have records and they are from 2019. He has been having worsening neck pain which bothers him significantly.   -MRI cervical spine showed cervical spondylosis, Mild spinal canal stenosis at C4-C7. Mild-to-moderate neural foraminal narrowing at C3-T1.   -continuye tizanidine 4 mg QHS  -will add lyrica 50 mg TID, he will start it QHS and add morning and afternoon doses as tolerated.     3. Drug-induced immunodeficiency  - recent labs reviewed  - no live vaccines  - vaccines per guidelines   - immunosuppression/infectious precautions reinforced     4. Arthralgias  -I also think there is a component of hypermobility since teenage years. We discussed nature and prognosis. I also discussed with patient that I do not think AS accounts for all his pain and likely need multi modal pain therapy.     Follow up in about 3 months (around 7/8/2025).    Method of " contact with patient concerns: [facility]    Disclaimer:  This note is prepared using voice recognition software and as such is likely to have errors and has not been proof read. Please contact me for questions.     Time spent: 40 minutes in face to face discussion concerning diagnosis, prognosis, review of lab and test results, benefits of treatment as well as management of disease, counseling of patient and coordination of care between various health care providers.      Lou Ramirez M.D.  Rheumatology  [facility]

## 2025-04-09 ENCOUNTER — TELEPHONE (OUTPATIENT)
Dept: RHEUMATOLOGY | Facility: CLINIC | Age: 45
End: 2025-04-09
Payer: COMMERCIAL

## 2025-04-09 NOTE — TELEPHONE ENCOUNTER
Informed patient we have faxed the prescription to [facility].

## 2025-04-15 ENCOUNTER — OFFICE VISIT (OUTPATIENT)
Dept: FAMILY MEDICINE | Facility: CLINIC | Age: 45
End: 2025-04-15
Payer: COMMERCIAL

## 2025-04-15 VITALS
BODY MASS INDEX: 23.03 KG/M2 | HEART RATE: 82 BPM | HEIGHT: 72 IN | RESPIRATION RATE: 16 BRPM | WEIGHT: 170 LBS | DIASTOLIC BLOOD PRESSURE: 76 MMHG | OXYGEN SATURATION: 98 % | SYSTOLIC BLOOD PRESSURE: 120 MMHG

## 2025-04-15 DIAGNOSIS — N52.9 ERECTILE DYSFUNCTION, UNSPECIFIED ERECTILE DYSFUNCTION TYPE: ICD-10-CM

## 2025-04-15 DIAGNOSIS — F32.A DEPRESSION, UNSPECIFIED DEPRESSION TYPE: ICD-10-CM

## 2025-04-15 DIAGNOSIS — M45.9 ANKYLOSING SPONDYLITIS, UNSPECIFIED SITE OF SPINE: ICD-10-CM

## 2025-04-15 DIAGNOSIS — E29.1 TESTOSTERONE DEFICIENCY IN MALE: ICD-10-CM

## 2025-04-15 DIAGNOSIS — Z13.220 SCREENING FOR LIPOID DISORDERS: ICD-10-CM

## 2025-04-15 DIAGNOSIS — M54.2 CERVICALGIA: ICD-10-CM

## 2025-04-15 DIAGNOSIS — Z00.01 ENCOUNTER FOR GENERAL ADULT MEDICAL EXAMINATION WITH ABNORMAL FINDINGS: Primary | ICD-10-CM

## 2025-04-15 DIAGNOSIS — Z13.1 SCREENING FOR DIABETES MELLITUS: ICD-10-CM

## 2025-04-15 LAB
CHOLEST SERPL-MCNC: 172 MG/DL
CHOLEST/HDLC SERPL: 4.5 {RATIO}
EST. AVERAGE GLUCOSE BLD GHB EST-MCNC: 111 MG/DL
GLUCOSE SERPL-MCNC: 86 MG/DL (ref 70–100)
HBA1C MFR BLD HPLC: 5.5 %
HDLC SERPL-MCNC: 38 MG/DL (ref 35–60)
LDLC SERPL CALC-MCNC: 96 MG/DL
LDLC/HDLC SERPL: 2.5 {RATIO}
NONHDLC SERPL-MCNC: 134 MG/DL
TRIGL SERPL-MCNC: 191 MG/DL (ref 34–140)
VLDLC SERPL-MCNC: 38 MG/DL

## 2025-04-15 PROCEDURE — 99396 PREV VISIT EST AGE 40-64: CPT | Mod: ,,, | Performed by: FAMILY MEDICINE

## 2025-04-15 PROCEDURE — 3008F BODY MASS INDEX DOCD: CPT | Mod: ,,, | Performed by: FAMILY MEDICINE

## 2025-04-15 PROCEDURE — 3074F SYST BP LT 130 MM HG: CPT | Mod: ,,, | Performed by: FAMILY MEDICINE

## 2025-04-15 PROCEDURE — 1159F MED LIST DOCD IN RCRD: CPT | Mod: ,,, | Performed by: FAMILY MEDICINE

## 2025-04-15 PROCEDURE — 80061 LIPID PANEL: CPT | Mod: ,,, | Performed by: CLINICAL MEDICAL LABORATORY

## 2025-04-15 PROCEDURE — 3078F DIAST BP <80 MM HG: CPT | Mod: ,,, | Performed by: FAMILY MEDICINE

## 2025-04-15 PROCEDURE — 82947 ASSAY GLUCOSE BLOOD QUANT: CPT | Mod: ,,, | Performed by: CLINICAL MEDICAL LABORATORY

## 2025-04-15 PROCEDURE — 83036 HEMOGLOBIN GLYCOSYLATED A1C: CPT | Mod: ,,, | Performed by: CLINICAL MEDICAL LABORATORY

## 2025-04-15 RX ORDER — SERTRALINE HYDROCHLORIDE 100 MG/1
TABLET, FILM COATED ORAL
Qty: 90 TABLET | Refills: 3 | Status: SHIPPED | OUTPATIENT
Start: 2025-04-15

## 2025-04-15 RX ORDER — TIZANIDINE 4 MG/1
4 TABLET ORAL NIGHTLY PRN
Qty: 30 TABLET | Refills: 5 | Status: SHIPPED | OUTPATIENT
Start: 2025-04-15 | End: 2025-10-12

## 2025-04-15 RX ORDER — DICLOFENAC SODIUM 75 MG/1
75 TABLET, DELAYED RELEASE ORAL 2 TIMES DAILY PRN
Qty: 180 TABLET | Refills: 3 | Status: SHIPPED | OUTPATIENT
Start: 2025-04-15

## 2025-04-15 RX ORDER — TADALAFIL 20 MG/1
20 TABLET ORAL DAILY
Qty: 10 TABLET | Refills: 11 | Status: SHIPPED | OUTPATIENT
Start: 2025-04-15 | End: 2026-04-15

## 2025-04-15 RX ORDER — TESTOSTERONE 50 MG/5G
2-3 GEL TRANSDERMAL DAILY
Qty: 2 EACH | Refills: 2 | Status: SHIPPED | OUTPATIENT
Start: 2025-04-15 | End: 2025-04-16

## 2025-04-15 RX ORDER — PANTOPRAZOLE SODIUM 40 MG/1
40 TABLET, DELAYED RELEASE ORAL DAILY
Qty: 90 TABLET | Refills: 3 | Status: SHIPPED | OUTPATIENT
Start: 2025-04-15

## 2025-04-15 NOTE — PROGRESS NOTES
Subjective     Patient ID: Alda Gamboa is a 45 y.o. male.    Chief Complaint: Healthy You (Z00.00)    HY Routine followup.  No significant interval change.  Hypermobile R patella which spontaneously dislocates and reduces.      Back Pain  This is a chronic problem. The current episode started more than 1 year ago. The problem occurs constantly. The problem has been gradually worsening since onset. The pain is present in the gluteal, lumbar spine, sacro-iliac, thoracic spine and costovertebral angle. The quality of the pain is described as burning, cramping, shooting and stabbing. The pain radiates to the left foot, left knee, left thigh, right foot, right knee and right thigh. The pain is at a severity of 7/10. The pain is severe. The pain is The same all the time. The symptoms are aggravated by bending, position, sitting, standing, stress and twisting. Stiffness is present In the morning. Associated symptoms include abdominal pain, bladder incontinence, chest pain, headaches, leg pain, numbness, paresthesias, tingling, weakness and weight loss. Pertinent negatives include no bowel incontinence, dysuria, fever, paresis, pelvic pain or perianal numbness. Risk factors include poor posture. The treatment provided mild relief.     Review of Systems   Constitutional:  Positive for weight loss. Negative for activity change, appetite change, fatigue, fever and unexpected weight change.   HENT:  Negative for nasal congestion, dental problem, ear pain, hearing loss, mouth sores, nosebleeds, sore throat, tinnitus and voice change.    Eyes:  Negative for pain, discharge and visual disturbance.   Respiratory:  Negative for apnea, choking, chest tightness, shortness of breath and wheezing.    Cardiovascular:  Positive for chest pain. Negative for palpitations, leg swelling and claudication.   Gastrointestinal:  Positive for abdominal pain. Negative for blood in stool, bowel incontinence and change in bowel habit.    Endocrine: Negative for polydipsia, polyphagia and polyuria.   Genitourinary:  Positive for bladder incontinence. Negative for dysuria, erectile dysfunction, flank pain, genital sores, hematuria and pelvic pain.   Musculoskeletal:  Positive for back pain and leg pain. Negative for arthralgias, gait problem, neck pain and neck stiffness.   Integumentary:  Negative for rash, wound, mole/lesion, breast mass and breast discharge.   Allergic/Immunologic: Negative for food allergies.   Neurological:  Positive for tingling, weakness, numbness, headaches and paresthesias. Negative for seizures, syncope and coordination difficulties.   Hematological:  Negative for adenopathy. Does not bruise/bleed easily.   Psychiatric/Behavioral:  Negative for agitation, behavioral problems, confusion, decreased concentration, hallucinations, self-injury, sleep disturbance and suicidal ideas. The patient is not nervous/anxious.    Breast: Negative for mass      Tobacco Use: Medium Risk (4/15/2025)    Patient History     Smoking Tobacco Use: Former     Smokeless Tobacco Use: Never     Passive Exposure: Past     Review of patient's allergies indicates:   Allergen Reactions    Pcn [penicillins] Hives and Swelling     Throat swelling    Sulfasalazine      Other reaction(s): Unknown    Gabapentin Nausea Only and Rash     Other reaction(s): Unknown     Current Outpatient Medications   Medication Instructions    BIMZELX AUTOINJECTOR 160 mg, Subcutaneous, Every 28 days    diclofenac (VOLTAREN) 75 mg, Oral, 2 times daily PRN    naltrexone capsule 4.5 mg, Oral, Nightly    pantoprazole (PROTONIX) 40 mg, Oral, Daily    pregabalin (LYRICA) 50 MG capsule Take 1 capsule (50 mg total) by mouth 3 times daily.    sertraline (ZOLOFT) 100 MG tablet Take one-half (1/2) tablet by mouth in the morning for 7 days, then take one (1) tablet daily.    tadalafiL (CIALIS) 20 mg, Oral, Daily    testosterone (TESTIM) 50 mg/5 gram (1 %) Gel 2-3 drops, Topical (Top),  Daily    tiZANidine (ZANAFLEX) 4 mg, Oral, Nightly PRN     Medications Discontinued During This Encounter   Medication Reason    tadalafiL (CIALIS) 20 MG Tab Reorder    testosterone (TESTIM) 50 mg/5 gram (1 %) Gel Reorder    diclofenac (VOLTAREN) 75 MG EC tablet Reorder    sertraline (ZOLOFT) 100 MG tablet Reorder    tiZANidine (ZANAFLEX) 4 MG tablet Reorder    pantoprazole (PROTONIX) 40 MG tablet Reorder    naltrexone capsule Reorder       Past Medical History:   Diagnosis Date    Acute bacterial prostatitis 05/31/2022    Positive Cx for UTI with scant hematuria Treated with 10 day course of Bactrim DS, completed yesterday    Allergy     Ankylosing spondylitis 05/31/2022    Patient takes immunosuppression - recently changed to Cimzia, Taltz Followed by Dr. Medel for this    Back pain     Elevated prostate specific antigen (PSA) 08/12/2022    Lab Results Component Value Date  PSA 7.600 (H) 08/10/2022  PSA 8.580 (H) 07/15/2022 s/p month of antx for tx of acute exacerbation of chronic prostatitis    Neck pain     Spondylosis      Health Maintenance Topics with due status: Not Due       Topic Last Completion Date    TETANUS VACCINE 08/04/2020    Colorectal Cancer Screening 01/10/2024    Lipid Panel 01/08/2025    RSV Vaccine (Age 60+ and Pregnant patients) Not Due     Immunization History   Administered Date(s) Administered    COVID-19, MRNA, LN-S, PF (MODERNA FULL 0.5 ML DOSE) 03/23/2021, 04/21/2021, 10/12/2021    Influenza - Quadrivalent - PF *Preferred* (6 months and older) 11/18/2022, 10/23/2023    Tdap 08/04/2020       Objective     Body mass index is 23.06 kg/m².  Wt Readings from Last 3 Encounters:   04/15/25 77.1 kg (170 lb)   04/08/25 77.3 kg (170 lb 6.7 oz)   12/20/24 80.7 kg (177 lb 14.6 oz)     Ht Readings from Last 3 Encounters:   04/15/25 6' (1.829 m)   04/08/25 6' (1.829 m)   12/20/24 6' (1.829 m)     BP Readings from Last 3 Encounters:   04/15/25 (!) 148/100   04/08/25 121/78   12/20/24 132/84     Temp  Readings from Last 3 Encounters:   04/15/24 97.7 °F (36.5 °C) (Oral)   01/31/24 97 °F (36.1 °C) (Oral)   01/25/24 97.8 °F (36.6 °C) (Oral)     Pulse Readings from Last 3 Encounters:   04/15/25 82   04/08/25 65   12/20/24 65     Resp Readings from Last 3 Encounters:   04/15/25 16   04/17/24 16   04/15/24 16     PF Readings from Last 3 Encounters:   No data found for PF       Physical Exam  Constitutional:       General: He is not in acute distress.     Appearance: Normal appearance.   HENT:      Head: Normocephalic.      Right Ear: Tympanic membrane and ear canal normal.      Left Ear: Tympanic membrane and ear canal normal.      Nose: Nose normal.      Mouth/Throat:      Mouth: Mucous membranes are moist.      Pharynx: No oropharyngeal exudate.   Eyes:      Extraocular Movements: Extraocular movements intact.      Pupils: Pupils are equal, round, and reactive to light.   Cardiovascular:      Rate and Rhythm: Normal rate and regular rhythm.      Heart sounds: No murmur heard.  Pulmonary:      Effort: Pulmonary effort is normal.      Breath sounds: Normal breath sounds. No wheezing.   Abdominal:      General: Abdomen is flat. Bowel sounds are normal.      Palpations: Abdomen is soft.      Hernia: No hernia is present.   Musculoskeletal:         General: Swelling and tenderness present. Normal range of motion.      Cervical back: Normal range of motion and neck supple.      Right lower leg: No edema.      Left lower leg: No edema.      Comments: 1st MCP joints bilat.     Lymphadenopathy:      Cervical: No cervical adenopathy.   Skin:     General: Skin is warm and dry.      Coloration: Skin is not jaundiced.      Findings: No lesion.   Neurological:      General: No focal deficit present.      Mental Status: He is alert and oriented to person, place, and time.      Cranial Nerves: No cranial nerve deficit.      Gait: Gait normal.   Psychiatric:         Mood and Affect: Mood normal.         Behavior: Behavior normal.          Judgment: Judgment normal.         Assessment and Plan     Problem List Items Addressed This Visit       Ankylosing spondylitis    Relevant Medications    tiZANidine (ZANAFLEX) 4 MG tablet    naltrexone capsule    diclofenac (VOLTAREN) 75 MG EC tablet    Testosterone deficiency in male    Relevant Medications    testosterone (TESTIM) 50 mg/5 gram (1 %) Gel     Other Visit Diagnoses         Erectile dysfunction, unspecified erectile dysfunction type        Relevant Medications    tadalafiL (CIALIS) 20 MG Tab      Depression, unspecified depression type        Relevant Medications    sertraline (ZOLOFT) 100 MG tablet      Cervicalgia        Relevant Medications    tiZANidine (ZANAFLEX) 4 MG tablet            Plan:       I have reviewed the medications, allergies, and problem list.     Goal Actions:    What type of visit is the patient here for today?: Healthy You  Does the patient consent to enroll in Color Me Healthy?: Yes  Is this a Wellness Follow Up?: Yes  What is your overall wellness goal? (select at least one): Lifestyle modifications  Choose 3: Biometric, Nutrition, Exercise  Biometric Actions: Attend regularly scheduled office visits  Nurtrition Actions: Avoid adding table salt  Exercise Actions: Recommend physical activity 30 minutes per day 3-5 times/week      Answers submitted by the patient for this visit:  Back Pain Questionnaire (Submitted on 4/14/2025)  Chief Complaint: Back pain  genital pain: No

## 2025-04-16 ENCOUNTER — PATIENT OUTREACH (OUTPATIENT)
Facility: HOSPITAL | Age: 45
End: 2025-04-16
Payer: COMMERCIAL

## 2025-04-16 ENCOUNTER — RESULTS FOLLOW-UP (OUTPATIENT)
Dept: FAMILY MEDICINE | Facility: CLINIC | Age: 45
End: 2025-04-16

## 2025-04-16 DIAGNOSIS — E29.1 TESTOSTERONE DEFICIENCY IN MALE: ICD-10-CM

## 2025-04-16 RX ORDER — TESTOSTERONE 50 MG/5G
GEL TRANSDERMAL
Qty: 300 EACH | Refills: 2 | Status: SHIPPED | OUTPATIENT
Start: 2025-04-16

## 2025-04-16 NOTE — PROGRESS NOTES
Population Health Chart Review & Patient Outreach Details    Post visit Population Health review of encounter with date of service  4/15/25 with [facility].  All required HY components in encounter.    Followup appt for:  Pt needs appt for HY  sent to [facility] to schedule via one note.  Further Action Needed If Patient Returns Outreach:        Health Maintenance Due   Topic Date Due    Pneumococcal Vaccines (Age 0-49) (1 of 2 - PCV) Never done    Influenza Vaccine (1) 2024          Updates Requested / Reviewed:     []  Care Everywhere    []     []  External Sources ([facility], [facility], [facility], etc.)    [] [facility]   [] [facility]   [] Other:    []  Care Team Updated   []  Removed  or Duplicate Orders   []  Immunization Reconciliation Completed / Queried    [] [place]   [] [place]   [] [place]   [] [place]      Health Maintenance Topics Addressed and Outreach Outcomes / Actions Taken:             Breast Cancer Screening []  Mammogram Order Placed    []  Mammogram Screening Scheduled    []  External Records Requested & Care Team Updated if Applicable    []  External Records Uploaded & Care Team Updated if Applicable    []  Pt Declined Scheduling Mammogram    []  Pt Will Schedule with External Provider / Order Routed & Care Team Updated if Applicable              Cervical Cancer Screening []  Pap Smear Scheduled in Primary Care or OBGYN    []  External Records Requested & Care Team Updated if Applicable       []  External Records Uploaded, Care Team Updated, & History Updated if Applicable    []  Patient Declined Scheduling Pap Smear    []  Patient Will Schedule with External Provider & Care Team Updated if Applicable                  Colorectal Cancer Screening []  Colonoscopy Case Request / Referral / Home Test Order Placed    []  External Records Requested & Care Team Updated if Applicable    []  External Records Uploaded, Care Team Updated, & History Updated if Applicable    []  Patient Declined  Completing Colon Cancer Screening    []  Patient Will Schedule with External Provider & Care Team Updated if Applicable    []  Fit Kit Mailed (add the SmartPhrase under additional notes)    []  Reminded Patient to Complete Home Test                Diabetic Eye Exam []  Eye Exam Screening Order Placed    []  Eye Camera Scheduled or Optometry/Ophthalmology Referral Placed    []  External Records Requested & Care Team Updated if Applicable    []  External Records Uploaded, Care Team Updated, & History Updated if Applicable    []  Patient Declined Scheduling Eye Exam    []  Patient Will Schedule with External Provider & Care Team Updated if Applicable             Blood Pressure Control []  Primary Care Follow Up Visit Scheduled     []  Remote Blood Pressure Reading Captured    []  Patient Declined Remote Reading or Scheduling Appt - Escalated to PCP    []  Patient Will Call Back or Send Portal Message with Reading                 HbA1c & Other Labs []  Overdue Lab(s) Ordered    []  Overdue Lab(s) Scheduled    []  External Records Uploaded & Care Team Updated if Applicable    []  Primary Care Follow Up Visit Scheduled     []  Reminded Patient to Complete A1c Home Test    []  Patient Declined Scheduling Labs or Will Call Back to Schedule    []  Patient Will Schedule with External Provider / Order Routed, & Care Team Updated if Applicable           Primary Care Appointment []  Primary Care Appt Scheduled    []  Patient Declined Scheduling or Will Call Back to Schedule    []  Pt Established with External Provider, Updated Care Team, & Informed Pt to Notify Payor if Applicable           Medication Adherence /    Statin Use []  Primary Care Appointment Scheduled    []  Patient Reminded to  Prescription    []  Patient Declined, Provider Notified if Needed    []  Sent Provider Message to Review to Evaluate Pt for Statin, Add Exclusion Dx Codes, Document   Exclusion in Problem List, Change Statin Intensity Level to  Moderate or High Intensity if Applicable                Osteoporosis Screening []  Dexa Order Placed    []  Dexa Appointment Scheduled    []  External Records Requested & Care Team Updated    []  External Records Uploaded, Care Team Updated, & History Updated if Applicable    []  Patient Declined Scheduling Dexa or Will Call Back to Schedule    []  Patient Will Schedule with External Provider / Order Routed & Care Team Updated if Applicable       Additional Notes:

## 2025-04-20 RX ORDER — TESTOSTERONE 50 MG/5G
GEL TRANSDERMAL
Qty: 300 EACH | Refills: 2 | Status: SHIPPED | OUTPATIENT
Start: 2025-04-20

## 2025-04-21 ENCOUNTER — TELEPHONE (OUTPATIENT)
Dept: HEMATOLOGY/ONCOLOGY | Facility: CLINIC | Age: 45
End: 2025-04-21
Payer: COMMERCIAL

## 2025-04-21 DIAGNOSIS — M54.2 NECK PAIN: Primary | Chronic | ICD-10-CM

## 2025-05-02 ENCOUNTER — PATIENT OUTREACH (OUTPATIENT)
Facility: HOSPITAL | Age: 45
End: 2025-05-02
Payer: COMMERCIAL

## 2025-05-02 NOTE — PROGRESS NOTES
Population Health Review...  Per [facility] website, insurance is active and patient is enrolled in CM:  CM! Provider [facility]! Benefit Start Date [facility]! Benefit End Date Baseline Risk Track(s) Baseline Risk Level Current Risk Tracks(s) Current Risk Level   PAULINA SIDDIQUI MD 04/16/2025 06/14/2026 Hypertension, Cholesterol Low Hypertension, Cholesterol Low

## 2025-05-15 ENCOUNTER — PATIENT MESSAGE (OUTPATIENT)
Dept: ADMINISTRATIVE | Facility: OTHER | Age: 45
End: 2025-05-15
Payer: COMMERCIAL

## 2025-05-16 ENCOUNTER — PATIENT MESSAGE (OUTPATIENT)
Dept: ADMINISTRATIVE | Facility: OTHER | Age: 45
End: 2025-05-16
Payer: COMMERCIAL

## (undated) DEVICE — SPONGE COTTON TRAY 4X4IN

## (undated) DEVICE — BANDAGE MATRIX HK LOOP 2IN 5YD

## (undated) DEVICE — NDL HYPODERMIC SAFETY 25G 1IN

## (undated) DEVICE — SLING ARM LARGE FOAM STRAP

## (undated) DEVICE — APPLICATOR CHLORAPREP ORN 26ML

## (undated) DEVICE — SOCKINETTE DOUBLE PLY 4X48IN

## (undated) DEVICE — Device

## (undated) DEVICE — SUT 4-0 VICRYL / FS-2

## (undated) DEVICE — GLOVE BIOGEL SKINSENSE PI 8.0

## (undated) DEVICE — SUT ETHILON 4-0 PS2 18 BLK

## (undated) DEVICE — GLOVE BIOGEL SKINSENSE PI 7.5

## (undated) DEVICE — PADDING WYTEX UNDRCST 2INX4YD

## (undated) DEVICE — GLOVE BIOGEL SKINSENSE PI 7.0

## (undated) DEVICE — SOL NACL IRR 1000ML BTL

## (undated) DEVICE — CUFF TOURNIQUET DL PRT

## (undated) DEVICE — GLOVE 7.0 PROTEXIS PI BLUE

## (undated) DEVICE — BANDAGE ESMARK 4INX3YD

## (undated) DEVICE — GOWN POLY REINF BRTH SLV XL

## (undated) DEVICE — GLOVE 8 PROTEXIS PI BLUE

## (undated) DEVICE — SYR 10CC LUER LOCK